# Patient Record
Sex: FEMALE | Race: WHITE
[De-identification: names, ages, dates, MRNs, and addresses within clinical notes are randomized per-mention and may not be internally consistent; named-entity substitution may affect disease eponyms.]

---

## 2017-01-10 ENCOUNTER — HOSPITAL ENCOUNTER (EMERGENCY)
Dept: HOSPITAL 74 - FER | Age: 82
Discharge: HOME | End: 2017-01-10
Payer: COMMERCIAL

## 2017-01-10 VITALS — SYSTOLIC BLOOD PRESSURE: 143 MMHG | HEART RATE: 98 BPM | TEMPERATURE: 97.9 F | DIASTOLIC BLOOD PRESSURE: 63 MMHG

## 2017-01-10 VITALS — BODY MASS INDEX: 30.9 KG/M2

## 2017-01-10 DIAGNOSIS — Z87.891: ICD-10-CM

## 2017-01-10 DIAGNOSIS — E78.00: ICD-10-CM

## 2017-01-10 DIAGNOSIS — S20.211A: Primary | ICD-10-CM

## 2017-01-10 DIAGNOSIS — I10: ICD-10-CM
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
## 2017-01-10 DIAGNOSIS — Z85.3: ICD-10-CM

## 2017-01-10 DIAGNOSIS — E11.9: ICD-10-CM
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
## 2017-01-10 DIAGNOSIS — Z79.82: ICD-10-CM

## 2017-01-10 NOTE — PDOC
History of Present Illness





- General


Chief Complaint: Injury


Stated Complaint: RT RIBCAGE, RT UPPER BACK PAIN


Time Seen by Provider: 01/10/17 10:49





- History of Present Illness


Initial Comments: 





01/10/17 12:07


Chief complaint: Pain right lateral ribs





History of present illness: Patient fell on Saturday, striking her right 

lateral chest wall. No pain at the time. Yesterday began to feel pain with 

movement and deep inspiration in the right lateral chest. This has become more 

severe.





Review of systems: Denies fevers/chills, cough, URI symptoms, sore throat, 

anterior or left-sided chest pain, shortness of breath, abdominal pain, nausea, 

vomiting, diarrhea, diaphoresis, dizziness or lightheadedness, vertigo, headache

, urinary symptoms, vaginal bleeding or discharge. Remainder systems reviewed 

and found to be negative





Past medical history: High blood pressure, non-insulin-dependent diabetes, 

elevated cholesterol.





Medications as noted





Social/family history: Former smoker, but now denies use of tobacco, alcohol, 

or prescription drugs. Active, stable, and family, no significant disability





Family history: Reviewed and noncontributory including early coronary artery 

disease, metabolic disease including diabetes, and cancer





Physical exam: Alert and oriented, well-developed well-nourished, no acute 

distress, cheerful and cooperative


Afebrile, vital signs stable


Head atraumatic. PERRLA, fundi benign, ENT clear


Neck supple without bruit mass or nodes. No tenderness or deformity to 

palpation. Full range of motion without pain


Chest clear to P&A with. Breath sounds throughout bilaterally. There is mild 

splinting with deep inspiration. There is tenderness of the right lateral chest 

wall, mid portion of the rib cage, without instability, crepitus, or deformity


CV S1 and S2 normal without murmur or gallop pulses full and symmetric no JVD 

or edema


Abdomen nondistended. Bowel sounds normal. Soft without masses tenderness 

organomegaly. No CVAT


Extremities no CCE


Neurological intact





Impression: Contusion of the rib cage, the absence of pain initially suggests 

that this is probably due to inflammation or muscle spasm





Plan: X-rays and further medical management depending on results.





Past History





- Past Medical History


Allergies/Adverse Reactions: 


 Allergies











Allergy/AdvReac Type Severity Reaction Status Date / Time


 


meperidine HCl [From Demerol] Allergy   Verified 01/10/17 10:29


 


Penicillins Allergy  Rash Verified 01/10/17 10:29











Home Medications: 


Ambulatory Orders





Aspirin [Aspirin EC] 81 mg PO DAILY 01/10/17 


Beta-Carotene(A) W-C and E/Min [Ocuvite (Nf)] 1 tab PO DAILY 01/10/17 


Calcium (Oyster Shell) [Os-Jaquan 500Mg -] 500 mg PO DAILY 01/10/17 


Cholecalciferol (Vitamin D3) [Vitamin D3] 5,000 unit PO BID 01/10/17 


Gabapentin 100 mg PO TID 01/10/17 


Glipizide 5 mg PO DAILY 01/10/17 


Levothyroxine [Synthroid -] 100 mcg PO DAILY 01/10/17 


Losartan/Hydrochlorothiazide [Losartan-Hctz 100-25 mg Tab] 1 each PO DAILY 01/10

/17 


Simvastatin 10 mg PO DAILY 01/10/17 








Anemia: No


Asthma: No


Cancer: Yes (LEFT BREAST)


CVA: No


COPD: No


Dementia: No


Diabetes: Yes


GI Disorders: No


 Disorders: No


HTN: Yes


Hypercholesterolemia: Yes


Liver Disease: No


Seizures: Yes


Thyroid Disease: No





- Surgical History


Appendectomy: Yes





- Psycho/Social/Smoking Cessation Hx


Anxiety: No


Suicidal Ideation: No


Smoking History: Former smoker


Have you smoked in the past 12 months: No


If you are a former smoker, when did you quit?: 2009


Information on smoking cessation initiated: No


Hx Alcohol Use: No


Drug/Substance Use Hx: No


Substance Use Type: None


Hx Substance Use Treatment: No





*Physical Exam





- Vital Signs


 Last Vital Signs











Temp Pulse Resp BP Pulse Ox


 


 97.9 F   98 H  18   143/63   98 


 


 01/10/17 10:28  01/10/17 10:28  01/10/17 10:28  01/10/17 10:28  01/10/17 10:28














Medical Decision Making





- Medical Decision Making





01/10/17 12:51


X-ray reviewed: No rib fractures. Some atelectasis at the right base, probably 

from splinting.





Patient declines narcotic analgesics. She will continue to take Advil as 

needed. She will do deep breathing exercises to keep the lungs expanded as 

often as possible as recommended. She will follow up with her primary physician 

if there is increased pain, fever or chills, cough, or other chest symptoms





She is fully ambulatory and in no significant pain or other distress upon 

discharge to follow-up as directed





*DC/Admit/Observation/Transfer


Diagnosis at time of Disposition: 


Contusion of rib


Qualifiers:


 Encounter type: initial encounter Laterality: right Qualified Code(s): 

S20.211A - Contusion of right front wall of thorax, initial encounter





- Discharge Dispostion


Disposition: HOME


Condition at time of disposition: Stable


Admit: No





- Referrals


Referrals: 


Melania Lemons MD [Primary Care Provider] - 2 Days





- Patient Instructions


Printed Discharge Instructions:  DI for Rib Contusion


Additional Instructions: 


Take adequate pain relief. Deep breathing exercises to keep lungs expanded. 

Recheck immediately if pain worsens or if you develop fever or chills, 

shortness of breath, or cough. Otherwise follow-up with your primary physician 

as directed

## 2017-06-23 ENCOUNTER — HOSPITAL ENCOUNTER (INPATIENT)
Dept: HOSPITAL 74 - FER | Age: 82
LOS: 4 days | Discharge: HOME HEALTH | DRG: 602 | End: 2017-06-27
Attending: INTERNAL MEDICINE | Admitting: INTERNAL MEDICINE
Payer: COMMERCIAL

## 2017-06-23 VITALS — BODY MASS INDEX: 35.6 KG/M2

## 2017-06-23 DIAGNOSIS — Z87.891: ICD-10-CM

## 2017-06-23 DIAGNOSIS — L03.116: Primary | ICD-10-CM

## 2017-06-23 DIAGNOSIS — G35: ICD-10-CM

## 2017-06-23 DIAGNOSIS — E11.40: ICD-10-CM

## 2017-06-23 DIAGNOSIS — Z79.84: ICD-10-CM

## 2017-06-23 DIAGNOSIS — T36.1X5A: ICD-10-CM

## 2017-06-23 DIAGNOSIS — Z66: ICD-10-CM

## 2017-06-23 DIAGNOSIS — R26.81: ICD-10-CM

## 2017-06-23 DIAGNOSIS — M19.90: ICD-10-CM

## 2017-06-23 DIAGNOSIS — Z85.3: ICD-10-CM

## 2017-06-23 DIAGNOSIS — I50.33: ICD-10-CM

## 2017-06-23 DIAGNOSIS — K52.1: ICD-10-CM

## 2017-06-23 DIAGNOSIS — M62.81: ICD-10-CM

## 2017-06-23 DIAGNOSIS — E78.00: ICD-10-CM

## 2017-06-23 DIAGNOSIS — I50.32: ICD-10-CM

## 2017-06-23 DIAGNOSIS — E66.9: ICD-10-CM

## 2017-06-23 DIAGNOSIS — I11.0: ICD-10-CM

## 2017-06-23 DIAGNOSIS — R60.0: ICD-10-CM

## 2017-06-23 LAB
ALBUMIN SERPL-MCNC: 3.8 G/DL (ref 3.5–5)
ALP SERPL-CCNC: 92 U/L (ref 32–92)
ALT SERPL-CCNC: 11 U/L (ref 10–40)
ANION GAP SERPL CALC-SCNC: 9 MMOL/L (ref 8–16)
AST SERPL-CCNC: 21 U/L (ref 10–42)
BASOPHILS # BLD: 0.4 % (ref 0–2)
BILIRUB SERPL-MCNC: 0.4 MG/DL (ref 0.2–1)
CALCIUM SERPL-MCNC: 10.1 MG/DL (ref 8.4–10.2)
CO2 SERPL-SCNC: 30 MMOL/L (ref 22–28)
CREAT SERPL-MCNC: 1.4 MG/DL (ref 0.6–1.3)
DEPRECATED RDW RBC AUTO: 14.5 % (ref 11.6–15.6)
EOSINOPHIL # BLD: 2 % (ref 0–4.5)
GLUCOSE SERPL-MCNC: 70 MG/DL (ref 74–106)
MCH RBC QN AUTO: 29.7 PG (ref 25.7–33.7)
MCHC RBC AUTO-ENTMCNC: 33.2 G/DL (ref 32–36)
MCV RBC: 89.4 FL (ref 80–96)
NEUTROPHILS # BLD: 79.5 % (ref 42.8–82.8)
PLATELET # BLD AUTO: 233 K/MM3 (ref 134–434)
PMV BLD: 11 FL (ref 7.5–11.1)
PROT SERPL-MCNC: 7.1 G/DL (ref 6.4–8.3)
WBC # BLD AUTO: 11.2 K/MM3 (ref 4–10.8)

## 2017-06-23 RX ADMIN — GABAPENTIN SCH MG: 300 CAPSULE ORAL at 21:40

## 2017-06-23 RX ADMIN — LOSARTAN POTASSIUM AND HYDROCHLOROTHIAZIDE TABLETS SCH TAB: 50; 12.5 TABLET, FILM COATED ORAL at 21:40

## 2017-06-23 RX ADMIN — ATORVASTATIN CALCIUM SCH MG: 10 TABLET, FILM COATED ORAL at 21:40

## 2017-06-23 RX ADMIN — CEFTRIAXONE SCH GM: 1 INJECTION, POWDER, FOR SOLUTION INTRAMUSCULAR; INTRAVENOUS at 21:40

## 2017-06-23 NOTE — PDOC
History of Present Illness





- General


Chief Complaint: Redness To Affected Area


Stated Complaint: BLE RED, SWELLING


Time Seen by Provider: 06/23/17 11:26


History Source: Patient


Exam Limitations: No Limitations





- History of Present Illness


Initial Comments: 


88 yo F presents with BLE swelling, LLE redness. She is accompanied by her 

aide. Patient states that she has noted swelling in both of her legs recently. 

She has developed blisters on both, the RLE blister has opened and is draining 

clear fluid. The LLE blisters are intact. The LLE has become erythematous and 

warm since last night, however. She has history of BLE edema, but this is worse 

than usual.





PMD Dr. Lemons








Past History





- Past Medical History


Allergies/Adverse Reactions: 


 Allergies











Allergy/AdvReac Type Severity Reaction Status Date / Time


 


meperidine HCl [From Demerol] Allergy   Verified 06/23/17 11:05


 


Penicillins Allergy  Rash Verified 06/23/17 11:05











Home Medications: 


Ambulatory Orders





Aspirin [Aspirin EC] 81 mg PO DAILY 01/10/17 


Beta-Carotene(A) W-C and E/Min [Ocuvite (Nf)] 1 tab PO DAILY 01/10/17 


Calcium (Oyster Shell) [Os-Jaquan 500Mg -] 500 mg PO DAILY 01/10/17 


Cholecalciferol (Vitamin D3) [Vitamin D3] 5,000 unit PO BID 01/10/17 


Gabapentin 100 mg PO TID 01/10/17 


Glipizide 5 mg PO DAILY 01/10/17 


Levothyroxine [Synthroid -] 100 mcg PO DAILY 01/10/17 


Losartan/Hydrochlorothiazide [Losartan-Hctz 100-25 mg Tab] 1 each PO DAILY 01/10

/17 


Simvastatin 10 mg PO DAILY 01/10/17 








Anemia: No


Asthma: No


Cancer: Yes (LEFT BREAST)


CVA: No


COPD: No


Dementia: No


Diabetes: Yes


GI Disorders: No


 Disorders: No


HTN: Yes


Hypercholesterolemia: Yes


Liver Disease: No


Seizures: Yes


Thyroid Disease: No





- Surgical History


Appendectomy: Yes





- Psycho/Social/Smoking Cessation Hx


Anxiety: No


Suicidal Ideation: No


Smoking History: Former smoker


Have you smoked in the past 12 months: No


If you are a former smoker, when did you quit?: 2009


Information on smoking cessation initiated: No


Hx Alcohol Use: No


Drug/Substance Use Hx: No


Substance Use Type: None


Hx Substance Use Treatment: No





**Review of Systems





- Review of Systems


Able to Perform ROS?: Yes


Comments:: 


GENERAL/CONSTITUTIONAL: No fever or chills. No weakness.


HEAD, EYES, EARS, NOSE AND THROAT: No change in vision. No ear pain or 

discharge. No sore throat.


CARDIOVASCULAR: No chest pain or shortness of breath.


RESPIRATORY: No cough, wheezing, or hemoptysis.


GASTROINTESTINAL: No nausea, vomiting, diarrhea or constipation.


GENITOURINARY: No dysuria, frequency, or change in urination.


MUSCULOSKELETAL: +BLE swelling. No neck or back pain.


SKIN: +Erythema to the L shin.


NEUROLOGIC: No headache, vertigo, loss of consciousness, or change in strength/

sensation.


ENDOCRINE: No increased thirst. No abnormal weight change.


HEMATOLOGIC/LYMPHATIC: No anemia, easy bleeding, or history of blood clots.


ALLERGIC/IMMUNOLOGIC: No hives or skin allergy.








*Physical Exam





- Vital Signs


 Last Vital Signs











Temp Pulse Resp BP Pulse Ox


 


 99 F   90   19   161/74   95 


 


 06/23/17 11:00  06/23/17 11:00  06/23/17 11:00  06/23/17 11:00 06/23/17 11:00














- Physical Exam


Comments: 


GENERAL: Awake, alert, and fully oriented, in no acute distress


HEAD: No signs of trauma


EYES: PERRLA, EOMI, sclera anicteric, conjunctiva clear


ENT: Auricles normal inspection, hearing grossly normal, nares patent, 

oropharynx clear without exudates. Moist mucosa


NECK: Normal ROM, supple, no lymphadenopathy, JVD, or masses


LUNGS: Breath sounds equal, clear to auscultation bilaterally. No wheezes, and 

no crackles


HEART: Regular rate and rhythm, normal S1 and S2, no murmurs, rubs or gallops


ABDOMEN: Soft, nontender, normoactive bowel sounds. No guarding, no rebound. No 

masses


EXTREMITIES: 3+ pitting edema to BLE. +Open lesion to the R shin draining clear 

fluid. No erythema. L shin with two intact bullous lesions containing clear 

fluid. +Erythema to the L shin. Remainder of extremities with normal range of 

motion, no edema. No clubbing or cyanosis. No cords, erythema, or tenderness


NEUROLOGICAL: Cranial nerves II through XII grossly intact. Normal speech, 

normal gait


SKIN: Warm, Dry, normal turgor, no rashes or lesions noted.








**Heart Score/ECG Review





- ECG Impressions


Comment:: 


EKG read 15:58- NSR 91 bpm, LAD,  RBBB. 





ED Treatment Course





- LABORATORY


CBC & Chemistry Diagram: 


 06/23/17 13:30





 06/23/17 13:30





Medical Decision Making





- Medical Decision Making


Case d/w Dr. Lemons, will place on obs for cellulitis. I have sent BCx and 

given clinda. 








*DC/Admit/Observation/Transfer


Diagnosis at time of Disposition: 


 Cellulitis of left leg





- Discharge Dispostion


Condition at time of disposition: Stable


Admit: Yes





- Referrals


Referrals: 


Melania Lemons MD [Primary Care Provider] -

## 2017-06-24 RX ADMIN — GABAPENTIN SCH MG: 300 CAPSULE ORAL at 22:02

## 2017-06-24 RX ADMIN — LEVOTHYROXINE SODIUM SCH MCG: 100 TABLET ORAL at 06:50

## 2017-06-24 RX ADMIN — FUROSEMIDE SCH MG: 10 INJECTION, SOLUTION INTRAVENOUS at 09:34

## 2017-06-24 RX ADMIN — SILVER SULFADIAZINE SCH APPLIC: 10 CREAM TOPICAL at 09:34

## 2017-06-24 RX ADMIN — CEFTRIAXONE SCH GM: 1 INJECTION, POWDER, FOR SOLUTION INTRAMUSCULAR; INTRAVENOUS at 09:34

## 2017-06-24 RX ADMIN — GABAPENTIN SCH MG: 300 CAPSULE ORAL at 06:50

## 2017-06-24 RX ADMIN — HYDROCHLOROTHIAZIDE SCH MG: 12.5 CAPSULE ORAL at 09:34

## 2017-06-24 RX ADMIN — LOSARTAN POTASSIUM AND HYDROCHLOROTHIAZIDE TABLETS SCH TAB: 50; 12.5 TABLET, FILM COATED ORAL at 09:34

## 2017-06-24 RX ADMIN — GABAPENTIN SCH MG: 300 CAPSULE ORAL at 13:23

## 2017-06-24 RX ADMIN — GLIPIZIDE SCH MG: 5 TABLET ORAL at 06:49

## 2017-06-24 RX ADMIN — ATORVASTATIN CALCIUM SCH MG: 10 TABLET, FILM COATED ORAL at 22:02

## 2017-06-24 NOTE — PN
Progress Note, Physician


Chief Complaint: 


The patient has less edema in the lower extremities, denies any leg discomfort 

or pain. She was able to ambulate earlier today without any dyspnea. Says that 

she is urinating with LAsix a significant amount of urine.


History of Present Illness: 


feeling better able to walk with less dyspnea





- Current Medication List


Current Medications: 


Active Medications





Atorvastatin Calcium (Lipitor -)  10 mg PO HS [facility]


   Last Admin: 06/23/17 21:40 Dose:  10 mg


Ceftriaxone Sodium (Rocephin 1gm Ivpb (Pre-Docked))  1 gm IVPB DAILY [facility]


   Last Admin: 06/24/17 09:34 Dose:  1 gm


Furosemide (Lasix Injection -)  40 mg IVPB DAILY [facility]


   Last Admin: 06/24/17 09:34 Dose:  40 mg


Gabapentin (Neurontin -)  300 mg PO TID [facility]


   Last Admin: 06/24/17 13:23 Dose:  300 mg


Glipizide (Glucotrol -)  5 mg PO DAILY@0700 [facility]


   Last Admin: 06/24/17 06:49 Dose:  5 mg


HCTZ/Losartan Potassium (Hyzaar -)  1 tab PO DAILY [facility]


   Last Admin: 06/24/17 09:34 Dose:  1 tab


Hydrochlorothiazide (Hctz -)  12.5 mg PO DAILY [facility]


   Last Admin: 06/24/17 09:34 Dose:  12.5 mg


Levothyroxine Sodium (Synthroid -)  100 mcg GT DAILY@0700 [facility]


   Last Admin: 06/24/17 06:50 Dose:  100 mcg


Potassium Chloride (K-Dur -)  20 meq PO ONCE ONE


   Stop: 06/24/17 18:09


Silver Sulfadiazine (Silvadene -)  1 applic TP DAILY [facility]


   Last Admin: 06/24/17 09:34 Dose:  1 applic











- Objective


Vital Signs: 


 Vital Signs











Temperature  98.1 F   06/24/17 14:28


 


Pulse Rate  87   06/24/17 14:28


 


Respiratory Rate  19   06/24/17 14:28


 


Blood Pressure  120/43   06/24/17 14:28


 


O2 Sat by Pulse Oximetry (%)  95   06/24/17 14:28











Constitutional: Yes: No Distress, Calm


Eyes: Yes: Conjunctiva Clear, EOM Intact


HENT: Yes: Atraumatic, Normocephalic


Neck: Yes: Supple, Trachea Midline


Cardiovascular: Yes: Regular Rate and Rhythm, S1, S2


Respiratory: Yes: Regular, CTA Bilaterally


Gastrointestinal: Yes: Normal Bowel Sounds, Soft, Abdomen, Obese.  No: 

Hepatomegaly, Splenomegaly, Tenderness


...Rectal Exam: Yes: Deferred


Breast(s): Yes: Other (left breast lumpectomy)


Musculoskeletal: Yes: Other (left lower extremty weakness)


Extremities: No: Calf Tenderness


Edema: Yes


Edema: LLE: 1+, RLE: 1+


Peripheral Pulses WNL: Yes


Integumentary: Yes: Erythema (decreased erythema, blisters are still present)


Neurological: Yes: Alert, Oriented, Unsteady Gait, Weakness (of both lower 

extremities)


Psychiatric: Yes: Alert, Oriented





Problem List





- Problems


(1) Cellulitis of left leg


Assessment/Plan: 


continue Ceftriaxone 1 gm iv daily


continue Silvadene applications


continue DPD applications


Code(s): L03.116 - CELLULITIS OF LEFT LOWER LIMB





(2) Edema due to congestive heart failure


Assessment/Plan: 


lasix iv 40 mg daily for 3 dyas


 ECHO to asses cardiac function and possible CHF


K dur 20 Gretchen po once


Code(s): I50.9 - HEART FAILURE, UNSPECIFIED





(3) Multiple sclerosis not affecting current episode of care


Assessment/Plan: 


the patient's symptoms are stable


Physical therapy evaluation


Code(s): MKW8273 - 





(4) Diabetes mellitus


Assessment/Plan: 


Glipizide daily and diabetic diet


Code(s): E11.9 - TYPE 2 DIABETES MELLITUS WITHOUT COMPLICATIONS   Qualifiers: 


     Diabetes mellitus complication detail: with unspecified neuropathy     

Diabetes mellitus long term insulin use: without long term use

## 2017-06-24 NOTE — EKG
Test Reason : 

Blood Pressure : ***/*** mmHG

Vent. Rate : 091 BPM     Atrial Rate : 091 BPM

   P-R Int : 178 ms          QRS Dur : 170 ms

    QT Int : 440 ms       P-R-T Axes : 054 -59 040 degrees

   QTc Int : 541 ms

 

*** POOR DATA QUALITY, INTERPRETATION MAY BE ADVERSELY AFFECTED

NORMAL SINUS RHYTHM

LEFT AXIS DEVIATION

RIGHT BUNDLE BRANCH BLOCK

NO PREVIOUS ECGS AVAILABLE

Confirmed by MD PALMIRA, LOU ([id number]) on 6/24/2017 10:47:16 AM

 

Referred By: MD LI           Confirmed By:LOU CHAVIS MD

## 2017-06-25 RX ADMIN — HYDROCHLOROTHIAZIDE SCH MG: 12.5 CAPSULE ORAL at 10:04

## 2017-06-25 RX ADMIN — GABAPENTIN SCH MG: 300 CAPSULE ORAL at 05:45

## 2017-06-25 RX ADMIN — LEVOTHYROXINE SODIUM SCH MCG: 100 TABLET ORAL at 06:29

## 2017-06-25 RX ADMIN — FUROSEMIDE SCH MG: 10 INJECTION, SOLUTION INTRAVENOUS at 10:08

## 2017-06-25 RX ADMIN — CEFTRIAXONE SCH GM: 1 INJECTION, POWDER, FOR SOLUTION INTRAMUSCULAR; INTRAVENOUS at 10:05

## 2017-06-25 RX ADMIN — SILVER SULFADIAZINE SCH APPLIC: 10 CREAM TOPICAL at 11:00

## 2017-06-25 RX ADMIN — LOSARTAN POTASSIUM AND HYDROCHLOROTHIAZIDE TABLETS SCH TAB: 50; 12.5 TABLET, FILM COATED ORAL at 10:04

## 2017-06-25 RX ADMIN — ATORVASTATIN CALCIUM SCH MG: 10 TABLET, FILM COATED ORAL at 22:04

## 2017-06-25 RX ADMIN — GABAPENTIN SCH MG: 300 CAPSULE ORAL at 14:00

## 2017-06-25 RX ADMIN — GLIPIZIDE SCH MG: 5 TABLET ORAL at 06:29

## 2017-06-25 RX ADMIN — GABAPENTIN SCH MG: 300 CAPSULE ORAL at 22:04

## 2017-06-25 NOTE — PN
Progress Note, Physician


Chief Complaint: 


Pedal +1 edema in the lower extremities, denies any leg discomfort or pain. She 

was able to ambulate earlier today without any dyspnea. Says that she has good 

urine output since starting LAsix treatment





- Current Medication List


Current Medications: 


Active Medications





Atorvastatin Calcium (Lipitor -)  10 mg PO HS [facility]


   Last Admin: 06/24/17 22:02 Dose:  10 mg


Ceftriaxone Sodium (Rocephin 1gm Ivpb (Pre-Docked))  1 gm IVPB DAILY [facility]


   Last Admin: 06/25/17 10:05 Dose:  1 gm


Furosemide (Lasix Injection -)  40 mg IVPB DAILY [facility]


   Last Admin: 06/25/17 10:08 Dose:  40 mg


Gabapentin (Neurontin -)  300 mg PO TID [facility]


   Last Admin: 06/25/17 05:45 Dose:  300 mg


Glipizide (Glucotrol -)  5 mg PO DAILY@0700 [facility]


   Last Admin: 06/25/17 06:29 Dose:  5 mg


HCTZ/Losartan Potassium (Hyzaar -)  1 tab PO DAILY [facility]


   Last Admin: 06/25/17 10:04 Dose:  1 tab


Hydrochlorothiazide (Hctz -)  12.5 mg PO DAILY [facility]


   Last Admin: 06/25/17 10:04 Dose:  12.5 mg


Levothyroxine Sodium (Synthroid -)  100 mcg GT DAILY@0700 [facility]


   Last Admin: 06/25/17 06:29 Dose:  100 mcg


Silver Sulfadiazine (Silvadene -)  1 applic TP DAILY [facility]


   Last Admin: 06/24/17 09:34 Dose:  1 applic











- Objective


Vital Signs: 


 Vital Signs











Temperature  98.0 F   06/25/17 06:00


 


Pulse Rate  88   06/25/17 06:00


 


Respiratory Rate  19   06/25/17 06:00


 


Blood Pressure  139/44   06/25/17 06:00


 


O2 Sat by Pulse Oximetry (%)  93 L  06/25/17 06:00











Constitutional: Yes: No Distress, Calm


Eyes: Yes: Conjunctiva Clear, EOM Intact


HENT: Yes: Atraumatic, Normocephalic


Neck: Yes: Supple, Trachea Midline


Cardiovascular: Yes: Regular Rate and Rhythm, S1, S2


Respiratory: Yes: Regular, CTA Bilaterally


Gastrointestinal: Yes: Normal Bowel Sounds, Soft, Abdomen, Obese.  No: 

Hepatomegaly, Splenomegaly


Breast(s): Yes: Other (left mastectomy)


Musculoskeletal: Yes: WNL


Edema: Yes


Edema: LLE: 1+, RLE: 1+


Peripheral Pulses WNL: Yes


Wound/Incision: Yes: Other (letf anterior shin blsters resolved)


Neurological: Yes: Alert, Oriented


Psychiatric: Yes: Alert, Oriented





Problem List





- Problems


(1) Cellulitis of left leg


Assessment/Plan: 


Ceftriaxone 1 gm iv daily


Code(s): L03.116 - CELLULITIS OF LEFT LOWER LIMB





(2) Edema due to congestive heart failure


Assessment/Plan: 


lasix


Code(s): I50.9 - HEART FAILURE, UNSPECIFIED





(3) Multiple sclerosis not affecting current episode of care


Code(s): [id number] - 





(4) Diabetes mellitus


Assessment/Plan: 


Glipizide daily


Code(s): E11.9 - TYPE 2 DIABETES MELLITUS WITHOUT COMPLICATIONS   Qualifiers: 


     Diabetes mellitus complication detail: with unspecified neuropathy     

Diabetes mellitus long term insulin use: without long term use

## 2017-06-26 LAB
ALBUMIN SERPL-MCNC: 3.5 G/DL (ref 3.5–5)
ALP SERPL-CCNC: 91 U/L (ref 32–92)
ALT SERPL-CCNC: 12 U/L (ref 10–40)
ANION GAP SERPL CALC-SCNC: 11 MMOL/L (ref 8–16)
AST SERPL-CCNC: 21 U/L (ref 10–42)
BASOPHILS # BLD: 0.7 % (ref 0–2)
BILIRUB SERPL-MCNC: 0.8 MG/DL (ref 0.2–1)
CALCIUM SERPL-MCNC: 8.5 MG/DL (ref 8.4–10.2)
CO2 SERPL-SCNC: 32 MMOL/L (ref 22–28)
CREAT SERPL-MCNC: 1.6 MG/DL (ref 0.6–1.3)
DEPRECATED RDW RBC AUTO: 14.2 % (ref 11.6–15.6)
EOSINOPHIL # BLD: 3.4 % (ref 0–4.5)
GLUCOSE SERPL-MCNC: 111 MG/DL (ref 74–106)
MAGNESIUM SERPL-MCNC: 1.9 MG/DL (ref 1.8–2.4)
MCH RBC QN AUTO: 30.2 PG (ref 25.7–33.7)
MCHC RBC AUTO-ENTMCNC: 33.8 G/DL (ref 32–36)
MCV RBC: 89.3 FL (ref 80–96)
NEUTROPHILS # BLD: 75.9 % (ref 42.8–82.8)
PLATELET # BLD AUTO: 242 K/MM3 (ref 134–434)
PMV BLD: 11.1 FL (ref 7.5–11.1)
PROT SERPL-MCNC: 6.5 G/DL (ref 6.4–8.3)
WBC # BLD AUTO: 9.6 K/MM3 (ref 4–10.8)

## 2017-06-26 RX ADMIN — Medication SCH TAB: at 09:06

## 2017-06-26 RX ADMIN — CEFTRIAXONE SCH GM: 1 INJECTION, POWDER, FOR SOLUTION INTRAMUSCULAR; INTRAVENOUS at 09:08

## 2017-06-26 RX ADMIN — GABAPENTIN SCH MG: 300 CAPSULE ORAL at 06:17

## 2017-06-26 RX ADMIN — GABAPENTIN SCH MG: 300 CAPSULE ORAL at 14:17

## 2017-06-26 RX ADMIN — GLIPIZIDE SCH MG: 5 TABLET ORAL at 06:17

## 2017-06-26 RX ADMIN — Medication SCH TAB: at 22:31

## 2017-06-26 RX ADMIN — LEVOTHYROXINE SODIUM SCH MCG: 100 TABLET ORAL at 06:17

## 2017-06-26 RX ADMIN — GABAPENTIN SCH MG: 300 CAPSULE ORAL at 22:31

## 2017-06-26 RX ADMIN — LOSARTAN POTASSIUM AND HYDROCHLOROTHIAZIDE TABLETS SCH TAB: 50; 12.5 TABLET, FILM COATED ORAL at 09:07

## 2017-06-26 RX ADMIN — ATORVASTATIN CALCIUM SCH MG: 10 TABLET, FILM COATED ORAL at 22:31

## 2017-06-26 RX ADMIN — FUROSEMIDE SCH MG: 10 INJECTION, SOLUTION INTRAVENOUS at 09:07

## 2017-06-26 RX ADMIN — SILVER SULFADIAZINE SCH APPLIC: 10 CREAM TOPICAL at 09:08

## 2017-06-26 RX ADMIN — HYDROCHLOROTHIAZIDE SCH MG: 12.5 CAPSULE ORAL at 09:07

## 2017-06-26 NOTE — PN
Progress Note, Physician


Chief Complaint: 


EDEMA OF THE LOWER EXTREMITIES RESOLVED, denies any leg discomfort or pain. She 

was able to ambulate earlier today without any dyspnea.The patient is diuresing 

well with LAsix..the patient had echocardiogram and results are pending.





- Current Medication List


Current Medications: 


Active Medications





Atorvastatin Calcium (Lipitor -)  [facility]


   Last Admin: 06/25/17 22:04 Dose:  10 mg


Ceftriaxone Sodium (Rocephin 1gm Ivpb (Pre-Docked))  1 gm IVPB DAILY [facility]


   Last Admin: 06/26/17 09:08 Dose:  1 gm


Gabapentin (Neurontin -)  300 mg PO TID [facility]


   Last Admin: 06/26/17 06:17 Dose:  300 mg


Glipizide (Glucotrol -)  5 mg PO DAILY@0700 [facility]


   Last Admin: 06/26/17 06:17 Dose:  5 mg


HCTZ/Losartan Potassium (Hyzaar -)  1 tab PO DAILY [facility]


   Last Admin: 06/26/17 09:07 Dose:  1 tab


Hydrochlorothiazide (Hctz -)  12.5 mg PO DAILY [facility]


   Last Admin: 06/26/17 09:07 Dose:  12.5 mg


Lactobacillus Acidophilus (Bacid -)  1 tab PO BID [facility]


   Last Admin: 06/26/17 09:06 Dose:  1 tab


Levothyroxine Sodium (Synthroid -)  100 mcg GT DAILY@0700 [facility]


   Last Admin: 06/26/17 06:17 Dose:  100 mcg


Silver Sulfadiazine (Silvadene -)  1 applic TP DAILY [facility]


   Last Admin: 06/26/17 09:08 Dose:  1 applic











- Objective


Vital Signs: 


 Vital Signs











Temperature  97.4 F L  06/26/17 14:04


 


Pulse Rate  85   06/26/17 14:04


 


Respiratory Rate  18   06/26/17 14:04


 


Blood Pressure  104/39   06/26/17 14:04


 


O2 Sat by Pulse Oximetry (%)  93 L  06/26/17 14:04











Constitutional: Yes: No Distress, Calm


Eyes: Yes: Conjunctiva Clear, EOM Intact


HENT: Yes: Atraumatic, Normocephalic


Neck: Yes: Supple, Trachea Midline


Cardiovascular: Yes: Regular Rate and Rhythm, S1, S2


Respiratory: Yes: Regular, CTA Bilaterally


Gastrointestinal: Yes: Normal Bowel Sounds, Abdomen, Obese.  No: Hepatomegaly, 

Splenomegaly


Breast(s): Yes: Other (left lumpectomy)


Edema: Yes


Edema: LLE: 1+, RLE: 1+


Peripheral Pulses WNL: Yes


Neurological: Yes: Alert, Oriented


Psychiatric: Yes: Alert, Oriented


Labs: 


 CBC, BMP





 06/26/17 07:46 





 06/26/17 07:46 











Problem List





- Problems


(1) Cellulitis of left leg


Assessment/Plan: 


continue Ceftriaxone 1 gm iv daily


Code(s): L03.116 - CELLULITIS OF LEFT LOWER LIMB





(2) Edema due to congestive heart failure


Assessment/Plan: 


lasix


Code(s): I50.9 - HEART FAILURE, UNSPECIFIED





(3) Multiple sclerosis not affecting current episode of care


Assessment/Plan: 


the patient's symptoms are stable


Physical therapy evaluation


Code(s): [id number] - 





(4) Diabetes mellitus


Assessment/Plan: 


Glipizide daily


Code(s): E11.9 - TYPE 2 DIABETES MELLITUS WITHOUT COMPLICATIONS   Qualifiers: 


     Diabetes mellitus complication detail: with unspecified neuropathy     

Diabetes mellitus long term insulin use: without long term use





(5) HTN (hypertension)


Assessment/Plan: 


continue Benicar


Code(s): I10 - ESSENTIAL (PRIMARY) HYPERTENSION

## 2017-06-27 VITALS — TEMPERATURE: 98.1 F | HEART RATE: 66 BPM | DIASTOLIC BLOOD PRESSURE: 56 MMHG | SYSTOLIC BLOOD PRESSURE: 101 MMHG

## 2017-06-27 RX ADMIN — LEVOTHYROXINE SODIUM SCH MCG: 100 TABLET ORAL at 06:15

## 2017-06-27 RX ADMIN — SILVER SULFADIAZINE SCH APPLIC: 10 CREAM TOPICAL at 09:44

## 2017-06-27 RX ADMIN — GLIPIZIDE SCH MG: 5 TABLET ORAL at 06:14

## 2017-06-27 RX ADMIN — HYDROCHLOROTHIAZIDE SCH MG: 12.5 CAPSULE ORAL at 09:44

## 2017-06-27 RX ADMIN — Medication SCH TAB: at 09:44

## 2017-06-27 RX ADMIN — GABAPENTIN SCH: 300 CAPSULE ORAL at 17:18

## 2017-06-27 RX ADMIN — GABAPENTIN SCH MG: 300 CAPSULE ORAL at 06:14

## 2017-06-27 RX ADMIN — CEFTRIAXONE SCH: 1 INJECTION, POWDER, FOR SOLUTION INTRAMUSCULAR; INTRAVENOUS at 09:44

## 2017-06-27 RX ADMIN — LOSARTAN POTASSIUM AND HYDROCHLOROTHIAZIDE TABLETS SCH TAB: 50; 12.5 TABLET, FILM COATED ORAL at 09:44

## 2017-06-27 NOTE — PN
Progress Note, Physician


Chief Complaint: 


EDEMA OF THE LOWER EXTREMITIES RESOLVED, denies any leg discomfort or pain. She 

was able to ambulate earlier today without any dyspnea.The patient has good 

urine output with LAsix. ECHO showed mildly dilated left atrium, mild mitral 

valve thickening, mild annular calcifiation and mild mitral regurgitation.


History of Present Illness: 


The patient developed loose stools during the past 24 hours not associated with 

mucosities, abdominal cramps  or blood. She is feeling better, is able to walk 

with less dyspnea





- Current Medication List


Current Medications: 


Active Medications





Atorvastatin Calcium (Lipitor -)  10 mg PO HS [facility]


   Last Admin: 06/26/17 22:31 Dose:  10 mg


Ceftriaxone Sodium (Rocephin 1gm Ivpb (Pre-Docked))  1 gm IVPB DAILY [facility]


   Last Admin: 06/27/17 09:44 Dose:  Not Given


Gabapentin (Neurontin -)  300 mg PO TID [facility]


   Last Admin: 06/27/17 06:14 Dose:  300 mg


Glipizide (Glucotrol -)  5 mg PO DAILY@0700 [facility]


   Last Admin: 06/27/17 06:14 Dose:  5 mg


HCTZ/Losartan Potassium (Hyzaar -)  1 tab PO DAILY [facility]


   Last Admin: 06/27/17 09:44 Dose:  1 tab


Hydrochlorothiazide (Hctz -)  12.5 mg PO DAILY [facility]


   Last Admin: 06/27/17 09:44 Dose:  12.5 mg


Lactobacillus Acidophilus (Bacid -)  1 tab PO BID [facility]


   Last Admin: 06/27/17 09:44 Dose:  1 tab


Levothyroxine Sodium (Synthroid -)  100 mcg GT DAILY@0700 [facility]


   Last Admin: 06/27/17 06:15 Dose:  100 mcg


Silver Sulfadiazine (Silvadene -)  1 applic TP DAILY [facility]


   Last Admin: 06/27/17 09:44 Dose:  1 applic











- Objective


Vital Signs: 


 Vital Signs











Temperature  98.1 F   06/27/17 14:21


 


Pulse Rate  66   06/27/17 14:21


 


Respiratory Rate  18   06/27/17 14:21


 


Blood Pressure  101/56   06/27/17 14:21


 


O2 Sat by Pulse Oximetry (%)  100   06/27/17 14:21











Constitutional: Yes: No Distress, Calm


Eyes: Yes: Conjunctiva Clear, EOM Intact


HENT: Yes: Atraumatic, Normocephalic


Neck: Yes: Supple, Trachea Midline


Cardiovascular: Yes: Regular Rate and Rhythm, S1, S2


Respiratory: Yes: Regular, CTA Bilaterally


Gastrointestinal: Yes: Normal Bowel Sounds, Soft


...Rectal Exam: Yes: WNL


Genitourinary: Yes: WNL


Extremities: No: Calf Tenderness, Erythema


Edema: No


Peripheral Pulses WNL: Yes


Integumentary: Yes: Erythema (resolved bilaterally)


Neurological: Yes: Alert, Oriented, Unsteady Gait, Weakness


Labs: 


 CBC, BMP





 06/26/17 07:46 





 06/26/17 07:46 











Problem List





- Problems


(1) Cellulitis of left leg


Assessment/Plan: 


discontinue Ceftriaxone 1 gm iv daily


start Keflex 500 mg po qid


follow up in the office in 1 week


discharge planning with [facility] 


Code(s): L03.116 - CELLULITIS OF LEFT LOWER LIMB





(2) Edema due to congestive heart failure


Assessment/Plan: 


lasix 20 mg po every other day in addition to Benicar 40/12.5 mg


Code(s): I50.9 - HEART FAILURE, UNSPECIFIED





(3) Multiple sclerosis not affecting current episode of care


Assessment/Plan: 


the patient's symptoms are stable


Physical therapy evaluation


Code(s): TGO1253 - 





(4) Diabetes mellitus


Assessment/Plan: 


Glipizide daily


Code(s): E11.9 - TYPE 2 DIABETES MELLITUS WITHOUT COMPLICATIONS   Qualifiers: 


     Diabetes mellitus complication detail: with unspecified neuropathy     

Diabetes mellitus long term insulin use: without long term use





(5) HTN (hypertension)


Assessment/Plan: 


continue Benicar


Code(s): I10 - ESSENTIAL (PRIMARY) HYPERTENSION   





(6) Diarrhea due to drug


Assessment/Plan: 


secondary to antibiotic administration


add BAcid 1 tab po bid and monitor


Code(s): K52.1 - TOXIC GASTROENTERITIS AND COLITIS

## 2017-06-27 NOTE — DS
Physical Examination


Vital Signs: 


 Vital Signs











Temperature  98.1 F   06/27/17 14:21


 


Pulse Rate  66   06/27/17 14:21


 


Respiratory Rate  18   06/27/17 14:21


 


Blood Pressure  101/56   06/27/17 14:21


 


O2 Sat by Pulse Oximetry (%)  100   06/27/17 14:21











Constitutional: Yes: Well Nourished, No Distress


Eyes: Yes: Conjunctiva Clear, EOM Intact


HENT: Yes: Atraumatic, Normocephalic


Cardiovascular: Yes: Regular Rate and Rhythm, S1, S2


Respiratory: Yes: Regular, CTA Bilaterally


Gastrointestinal: Yes: Normal Bowel Sounds, Soft, Abdomen, Obese.  No: 

Hepatomegaly, Splenomegaly


...Rectal Exam: Yes: Deferred


Breast(s): Yes: Other (left breast lumpectomy)


Extremities: No: Calf Tenderness


Edema: No


Peripheral Pulses WNL: Yes


Integumentary: Yes: Other (healed anterior shins lesions , covered by dry crust)


Neurological: Yes: Alert, Oriented


Psychiatric: Yes: Alert, Oriented


Labs: 


 CBC, BMP





 06/26/17 07:46 





 06/26/17 07:46 











Discharge Summary


Reason For Visit: CELLULITIS LT LEG


Current Active Problems





Cellulitis of left leg (Acute) 


Diabetes mellitus (Acute) 


Edema due to congestive heart failure (Acute) 


Multiple sclerosis not affecting current episode of care (Acute) 








Condition: Stable





- Instructions


Referrals: 


Melania eLmons MD [Primary Care Provider] - 


Disposition: [facility]/HOME HEALTH CARE





- Home Medications


Comprehensive Discharge Medication List: 


Ambulatory Orders





Aspirin [Aspirin EC] 81 mg PO DAILY 01/10/17 


Beta-Carotene(A) W-C and E/Min [Ocuvite (Nf) -] 1 tab PO DAILY 01/10/17 


Calcium (Oyster Shell) [Os-Jaquan 500MG -] 500 mg PO DAILY 01/10/17 


Cholecalciferol (Vitamin D3) [Vitamin D3] 5,000 unit PO BID 01/10/17 


Gabapentin 100 mg PO TID 01/10/17 


Glipizide 5 mg PO DAILY 01/10/17 


Levothyroxine [Synthroid -] 100 mcg PO DAILY 01/10/17 


Losartan/Hydrochlorothiazide [Losartan-Hctz 100-25 mg Tab] 1 each PO DAILY 01/10

/17 


Simvastatin 10 mg PO DAILY 01/10/17 


Atorvastatin Ca [Lipitor] 10 mg PO HS  tablet 06/27/17 


Glipizide [Glucotrol -] 5 mg PO DAILY@0700  tablet 06/27/17 


Hydrochlorothiazide [Hctz -] 12.5 mg PO DAILY  cap 06/27/17 


Lactobacillus Acidophilus [Bacid -] 1 tab PO BID #60 tab 06/27/17 


Levothyroxine [Synthroid -] 100 mcg GT DAILY@0700  tablet 06/27/17 


Losartan 50Mg/Hctz 12.5MG [Hyzaar -] 1 tab PO DAILY  tablet 06/27/17 


Silver Sulfadiazine 1% Top Cr [Silvadene -] 1 applic TP DAILY  jar 06/27/17

## 2017-10-18 ENCOUNTER — HOSPITAL ENCOUNTER (OUTPATIENT)
Dept: HOSPITAL 74 - FRADUS-SUR | Age: 82
Discharge: HOME | End: 2017-10-18
Attending: LEGAL MEDICINE
Payer: COMMERCIAL

## 2017-10-18 DIAGNOSIS — N63.24: ICD-10-CM

## 2017-10-18 DIAGNOSIS — C50.312: Primary | ICD-10-CM

## 2017-10-18 PROCEDURE — 0HBU3ZX EXCISION OF LEFT BREAST, PERCUTANEOUS APPROACH, DIAGNOSTIC: ICD-10-PCS

## 2017-10-18 PROCEDURE — A4648 IMPLANTABLE TISSUE MARKER: HCPCS

## 2017-10-19 NOTE — PATH
Surgical Pathology Report



Patient Name:  SACKS, DIANA

Accession #:  [id number]

[facility]. Rec. #:  [id number]                                                        

   /Age/Gender:  1930 (Age: 87) / F

Account:  [id number]                                                          

             Location: [facility]

Taken:  10/18/2017

Received:  10/18/2017

Reported:  10/19/2017

Physicians:  KATHRYN Alcocer

  



Specimen(s) Received

 LEFT BREAST CORE BIOPSY 7:00, 3CM FN 





Clinical History

Ultrasound findings: Highly suspicious







Final Diagnosis

BREAST, LEFT, 7:00, 3 CM FN, CORE BIOPSY:

INVASIVE DUCTAL CARCINOMA, MODERATELY DIFFERENTIATED, MEASURING AT LEAST 7 MM IN

GREATEST DIMENSION IN THIS MATERIAL.



Results of ER) and AR studies performed at [facility] are as

follows:

ER (clone [id number] mouse monoclonal antibody by [employer]) : >90 % nuclear staining with

strong intensity (Positive).

AR (clone16 mouse monoclonal antibody by [employer]): ~10 % nuclear staining with

moderate intensity (Positive).



Results of HER-2 and Ki67 studies will be reported separately in an addendum.



Positive and negative controls (internal if applicable) show appropriate

results.

Formalin fixation and cold ischemic times are within current ASCO/CAP

recommendations for ER, AR and Her2 testing.





***Electronically Signed***

Michaela Warren M.D.



Addendum     

Reported: 10/23/2017



Addendum Diagnosis

Results of Her2 (IHC) & Ki-67 studies performed at [place], [place] ([id number]) are as follows:

Her2 IHC (EP3 from [employer], formerly known as [id number], using Bond Polymer Refine

detection kit): 0 (Negative).

Ki-67: ~30% (Intermediate proliferative index).



Positive and negative controls (internal if applicable) show appropriate

results.





   

 



Gross Description

Received in formalin, labeled "left breast biopsy 7:00, 3cmfn," are 3

tan-yellow, cylindrical portions of fibroadipose tissue ranging from 0.9-1.6 cm.

in length and averaging 0.1 cm. in diameter. The specimen is submitted in toto

in one cassette. 

Time to formalin fixation: 2 minutes

Total formalin fixation time: Approximately 8 hours.

/10/18/2017



saudi/10/18/2017

## 2018-01-18 ENCOUNTER — HOSPITAL ENCOUNTER (OUTPATIENT)
Dept: HOSPITAL 74 - FER | Age: 83
Setting detail: OBSERVATION
LOS: 1 days | Discharge: NURSING, REHAB OR LONG-TERM CARE | End: 2018-01-19
Attending: INTERNAL MEDICINE | Admitting: INTERNAL MEDICINE
Payer: COMMERCIAL

## 2018-01-18 VITALS — BODY MASS INDEX: 28.3 KG/M2

## 2018-01-18 DIAGNOSIS — R26.2: ICD-10-CM

## 2018-01-18 DIAGNOSIS — C50.912: ICD-10-CM

## 2018-01-18 DIAGNOSIS — Z99.89: ICD-10-CM
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
## 2018-01-18 DIAGNOSIS — Z91.81: ICD-10-CM

## 2018-01-18 DIAGNOSIS — Z88.0: ICD-10-CM

## 2018-01-18 DIAGNOSIS — S32.89XA: Primary | ICD-10-CM

## 2018-01-18 DIAGNOSIS — Z87.891: ICD-10-CM

## 2018-01-18 DIAGNOSIS — E78.5: ICD-10-CM

## 2018-01-18 DIAGNOSIS — E66.9: ICD-10-CM

## 2018-01-18 DIAGNOSIS — Z79.82: ICD-10-CM

## 2018-01-18 DIAGNOSIS — T79.6XXA: ICD-10-CM

## 2018-01-18 DIAGNOSIS — Z79.84: ICD-10-CM

## 2018-01-18 DIAGNOSIS — E11.69: ICD-10-CM

## 2018-01-18 DIAGNOSIS — I10: ICD-10-CM

## 2018-01-18 DIAGNOSIS — G35: ICD-10-CM

## 2018-01-18 DIAGNOSIS — S30.0XXA: ICD-10-CM

## 2018-01-18 DIAGNOSIS — E03.9: ICD-10-CM
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
## 2018-01-18 LAB
ALBUMIN SERPL-MCNC: 4 G/DL (ref 3.5–5)
ALP SERPL-CCNC: 101 U/L (ref 32–92)
ALT SERPL-CCNC: 13 U/L (ref 10–40)
ANION GAP SERPL CALC-SCNC: 10 MMOL/L (ref 8–16)
AST SERPL-CCNC: 25 U/L (ref 10–42)
BILIRUB SERPL-MCNC: 0.4 MG/DL (ref 0.2–1)
BUN SERPL-MCNC: 41 MG/DL (ref 7–18)
CALCIUM SERPL-MCNC: 9.7 MG/DL (ref 8.4–10.2)
CHLORIDE SERPL-SCNC: 101 MMOL/L (ref 98–107)
CO2 SERPL-SCNC: 31 MMOL/L (ref 22–28)
COLOR UR: YELLOW
CREAT SERPL-MCNC: 1.4 MG/DL (ref 0.6–1.3)
DEPRECATED RDW RBC AUTO: 14.5 % (ref 11.6–15.6)
GLUCOSE SERPL-MCNC: 93 MG/DL (ref 74–106)
HCT VFR BLD CALC: 39.7 % (ref 32.4–45.2)
HGB BLD-MCNC: 12.9 GM/DL (ref 10.7–15.3)
INR BLD: 1.11 (ref 0.82–1.09)
MCH RBC QN AUTO: 29.4 PG (ref 25.7–33.7)
MCHC RBC AUTO-ENTMCNC: 32.5 G/DL (ref 32–36)
MCV RBC: 90.7 FL (ref 80–96)
PH UR: 7 [PH] (ref 4.5–8)
PLATELET # BLD AUTO: 240 K/MM3 (ref 134–434)
PLATELET BLD QL SMEAR: ADEQUATE
PMV BLD: 11.5 FL (ref 7.5–11.1)
POTASSIUM SERPLBLD-SCNC: 4 MMOL/L (ref 3.5–5.1)
PROT SERPL-MCNC: 7.6 G/DL (ref 6.4–8.3)
PT PNL PPP: 12.4 SEC (ref 10.2–13)
RBC # BLD AUTO: 4.38 M/MM3 (ref 3.6–5.2)
SODIUM SERPL-SCNC: 142 MMOL/L (ref 136–145)
SP GR UR: 1.01 (ref 1–1.02)
TROPONIN I SERPL-MCNC: < 0.03 NG/ML (ref 0.03–0.5)
UROBILINOGEN UR STRIP-MCNC: 0.2 MG/DL (ref 0.2–1)
WBC # BLD AUTO: 14.8 K/MM3 (ref 4–10.8)

## 2018-01-18 PROCEDURE — G0378 HOSPITAL OBSERVATION PER HR: HCPCS

## 2018-01-18 RX ADMIN — GABAPENTIN SCH MG: 300 CAPSULE ORAL at 21:48

## 2018-01-18 RX ADMIN — IBUPROFEN PRN MG: 400 TABLET, FILM COATED ORAL at 18:30

## 2018-01-18 RX ADMIN — ANASTROZOLE SCH MG: 1 TABLET, COATED ORAL at 18:34

## 2018-01-18 NOTE — HP
Admitting History and Physical





- Admission


Chief Complaint: fall at home on the buttocks and inability to stand up after  

that secondary to pain in the left lower extremity


History of Present Illness: 





86 yo female who has h/o Multiple sclerosis lost  her balance and fell at home. 

the patient fell on her buttocks without hitting her head. After the fall she 

was unable to stand up or walk due to pain in the left hip.She denies any 

dizziness associated with the episode.


History Source: Patient





- Past Medical History


CNS: Yes: Multiple Sclerosis


Cardiovascular: Yes: HTN, Hyperlipdemia.  No: AFIB, CAD, CHF


Gastrointestinal: Yes: Constipation


Renal/: Yes: Neurogenic Bladder


...Pregnant: No


Heme/Onc: Yes: Cancer (breast cancer)


Musculoskeletal: Yes: Osteoarthritis, Other (left lower extremity paresis)


Endocrine: Yes: Diabetes Mellitus





- Smoking History


Smoking history: Former smoker


Have you smoked in the past 12 months: No


Aproximately how many cigarettes per day: 0


If you are a former smoker, when did you quit?: 2009





- Alcohol/Substance Use


Hx Alcohol Use: No





- Social History


History of Recent Travel: No





Home Medications





- Allergies


Allergies/Adverse Reactions: 


 Allergies











Allergy/AdvReac Type Severity Reaction Status Date / Time


 


Penicillins Allergy  Rash Verified 06/23/17 11:05


 


meperidine HCl [From Demerol] AdvReac   Verified 01/18/18 11:47














- Home Medications


Home Medications: 


Ambulatory Orders





Aspirin [Aspirin EC] 81 mg PO DAILY 01/10/17 


Beta-Carotene(A) W-C and E/Min [Ocuvite (Nf) -] 1 tab PO DAILY 01/10/17 


Gabapentin 100 mg PO TID 01/10/17 


Glipizide 5 mg PO HS 01/10/17 


Levothyroxine [Synthroid -] 100 mcg PO DAILY 01/10/17 


Losartan/Hydrochlorothiazide [Losartan-Hctz 100-25 mg Tab] 1 each PO DAILY 01/10

/17 


Simvastatin 10 mg PO HS 01/10/17 


Arimidex - 1 mg PO DAILY 01/18/18 











Family Disease History





- Family Disease History


Family Disease History: Heart Disease: Father (fatal MI 63), Other: Son (2 sons 

with multiple sclerosis)





Review of Systems





- Review of Systems


Constitutional: reports: No Symptoms


Eyes: reports: No Symptoms


HENT: reports: No Symptoms


Neck: reports: No Symptoms


Cardiovascular: reports: No Symptoms


Respiratory: reports: No Symptoms


Gastrointestinal: reports: No Symptoms


Genitourinary: reports: Frequency, Incontinence, Urgency


Breasts: reports: No Symptoms Reported


Endocrine: reports: No Symptoms


Hematology/Lymphatic: reports: No Symptoms


Psychiatric: reports: No Symptoms





Physical Examination


Vital Signs: 


 Vital Signs











Temperature  97.8 F   01/18/18 14:07


 


Pulse Rate  90   01/18/18 14:07


 


Respiratory Rate  18   01/18/18 21:00


 


Blood Pressure  146/48   01/18/18 14:07


 


O2 Sat by Pulse Oximetry (%)  95   01/18/18 14:07











Constitutional: Yes: Well Nourished, No Distress, Calm


Eyes: Yes: Conjunctiva Clear, EOM Intact


HENT: Yes: Atraumatic, Normocephalic


Neck: Yes: Supple, Trachea Midline


Cardiovascular: Yes: Regular Rate and Rhythm, S1, S2


Respiratory: Yes: Regular, CTA Bilaterally


Gastrointestinal: Yes: Normal Bowel Sounds, Soft.  No: Abdomen, Obese


...Rectal Exam: Yes: Deferred


Breast(s): Yes: WNL


Musculoskeletal: Yes: Muscle Weakness


Extremities: No: Calf Tenderness


Edema: No


Peripheral Pulses WNL: Yes


Neurological: Yes: Alert, Oriented


...Motor Strength: LUE, LLE (weakness bilaterally in bothe lower extremiteis)


Psychiatric: Yes: Alert, Oriented


Labs: 


 CBC, BMP





 01/18/18 07:15 





 01/18/18 07:06 











Imaging





- Results


Chest X-ray: Other (X ray of the left hip no fracture or disclocation)





Problem List





- Problems


(1) Contusion of pelvis


Assessment/Plan: 


Motrin 400 mg pi Qid


 I will monitor response to pain medications


Code(s): S30.0XXA - CONTUSION OF LOWER BACK AND PELVIS, INITIAL ENCOUNTER   


Qualifiers: 


   Encounter type: initial encounter   Qualified Code(s): S30.0XXA - Contusion 

of lower back and pelvis, initial encounter   





(2) Multiple sclerosis


Assessment/Plan: 


continue Neurontin


Code(s): G35 - MULTIPLE SCLEROSIS   





(3) Diabetes mellitus


Assessment/Plan: 


Glucotrol 5 mg po daily


Code(s): E11.9 - TYPE 2 DIABETES MELLITUS WITHOUT COMPLICATIONS   


Qualifiers: 


   Diabetes mellitus complication detail: with unspecified neuropathy   

Diabetes mellitus long term insulin use: without long term use 





(4) HTN (hypertension)


Assessment/Plan: 


Losartan/HCTZ


Code(s): I10 - ESSENTIAL (PRIMARY) HYPERTENSION   





(5) Diabetes mellitus type 2 in obese


Assessment/Plan: 


continue Glucophage XR


Code(s): E11.69 - TYPE 2 DIABETES MELLITUS WITH OTHER SPECIFIED COMPLICATION; 

E66.9 - OBESITY, UNSPECIFIED   





(6) Breast cancer, left


Assessment/Plan: 


Continue Arimidex 1mg po daiy


Code(s): C50.912 - MALIGNANT NEOPLASM OF UNSPECIFIED SITE OF LEFT FEMALE BREAST

## 2018-01-18 NOTE — PDOC
*Physical Exam





- Vital Signs


 Last Vital Signs











Temp Pulse Resp BP Pulse Ox


 


 97.9 F   81   14   149/64   95 


 


 01/18/18 05:14  01/18/18 05:14  01/18/18 05:14  01/18/18 05:14  01/18/18 05:14














- Physical Exam


General Appearance: Yes: Nourished, Obese


HEENT: positive: JUANA


Neck: positive: Supple


Respiratory/Chest: positive: Lungs Clear, Normal Breath Sounds





**Heart Score/ECG Review





- ECG Intrepretation


Rhythm: Regular Rhythm





- QRS


Widened: RBBB


Poor R Wave Progression: No





ED Treatment Course





- LABORATORY


CBC & Chemistry Diagram: 


 01/18/18 07:15





 01/18/18 07:06





- ADDITIONAL ORDERS


Additional order review: 


 Laboratory  Results











  01/18/18 01/18/18





  07:06 07:06


 


Sodium   142


 


Potassium   4.0


 


Chloride   101


 


Carbon Dioxide   31 H


 


Anion Gap   10


 


BUN   41 H D


 


Creatinine   1.4 H


 


Creat Clearance w eGFR   35.57


 


Calcium   9.7


 


Total Bilirubin   0.4  D


 


AST   25


 


ALT   13


 


Alkaline Phosphatase   101 H


 


Creatine Kinase  198 H 


 


Total Protein   7.6


 


Albumin   4.0














*DC/Admit/Observation/Transfer


Diagnosis at time of Disposition: 


 Multiple sclerosis





Contusion of pelvis


Qualifiers:


 Encounter type: initial encounter Qualified Code(s): S30.0XXA - Contusion of 

lower back and pelvis, initial encounter





Rhabdomyolysis


Qualifiers:


 Rhabdomyolysis type: traumatic Encounter type: initial encounter Qualified Code

(s): T79.6XXA - Traumatic ischemia of muscle, initial encounter








- Discharge Dispostion


Condition at time of disposition: Stable


Admit: Yes





- Referrals


Referrals: 


Melania Lemons MD [Primary Care Provider] - 





- Patient Instructions





- Post Discharge Activity

## 2018-01-18 NOTE — PDOC
History of Present Illness





- History of Present Illness


Initial Comments: 





01/18/18 05:40


This 87-year-old woman with a history of hypertension/DM/hypothyroidism is 

brought in by ambulance with a history of fall.  Patient was using her walker 

to go to the bathroom when she lost her balance and fell.  No loss of 

consciousness; patient denies hitting her head or neck.  She has pain in the 

left anterior proximal thigh.  Patient contacted a neighbor and EMS was called: 

Proximal left thigh pain is worsened with weightbearing.  No previous history 

of hip or pelvis injury.





<Pearl Mackey - Last Filed: 01/18/18 07:10>





- General


History Source: Patient





<Georgie Jesus - Last Filed: 01/19/18 13:56>





- General


Chief Complaint: Injury


Stated Complaint: BIBA, S/P FALL L PELVIC PAIN


Time Seen by Provider: 01/18/18 05:37





Past History





- Past Medical History


Anemia: No


Asthma: No


Cancer: Yes (LEFT BREAST)


CVA: No


COPD: No


Dementia: No


Diabetes: Yes


GI Disorders: No


 Disorders: No


HTN: Yes


Hypercholesterolemia: Yes


Liver Disease: No


Seizures: Yes


Thyroid Disease: No





- Surgical History


Appendectomy: Yes





- Suicide/Smoking/Psychosocial Hx


Smoking History: Former smoker


Have you smoked in the past 12 months: No


Number of Cigarettes Smoked Daily: 0


If you are a former smoker, when did you quit?: 2009


Information on smoking cessation initiated: No


Hx Alcohol Use: No


Drug/Substance Use Hx: No


Substance Use Type: None


Hx Substance Use Treatment: No





<Pearl Mackey - Last Filed: 01/18/18 07:10>





- Travel


Traveled outside of the country in the last 30 days: No


Close contact w/someone who was outside of country & ill: No





<Georgie Jesus - Last Filed: 01/19/18 13:56>





- Past Medical History


Allergies/Adverse Reactions: 


 Allergies











Allergy/AdvReac Type Severity Reaction Status Date / Time


 


Penicillins Allergy  Rash Verified 06/23/17 11:05


 


meperidine HCl [From Demerol] AdvReac   Verified 01/18/18 11:47











Home Medications: 


Ambulatory Orders





Aspirin [Aspirin EC] 81 mg PO DAILY 01/10/17 


Beta-Carotene(A) W-C and E/Min [Ocuvite (Nf) -] 1 tab PO DAILY 01/10/17 


Gabapentin 100 mg PO TID 01/10/17 


Glipizide 5 mg PO HS 01/10/17 


Levothyroxine [Synthroid -] 100 mcg PO DAILY 01/10/17 


Losartan/Hydrochlorothiazide [Losartan-Hctz 100-25 mg Tab] 1 each PO DAILY 01/10

/17 


Simvastatin 10 mg PO HS 01/10/17 


Arimidex - 1 mg PO DAILY 01/18/18 


Acetaminophen [Tylenol .Regular Strength -] 650 mg PO Q6H PRN  tablet 01/19/18 


Anastrozole [Arimidex -] 1 mg PO DAILY  tablet 01/19/18 


Gabapentin [Neurontin -] 300 mg PO TID  capsule 01/19/18 


Glipizide Xl [Glucotrol Xl -] 5 mg PO DAILY@0700  tab.er.24 01/19/18 


Ibuprofen [Motrin -] 400 mg PO Q6H PRN  tablet 01/19/18 


Levothyroxine [Synthroid -] 100 mcg GT DAILY@0700  tablet 01/19/18 


Losartan 50Mg/Hctz 12.5MG [Hyzaar -] 1 tab PO DAILY  tablet 01/19/18 











**Review of Systems





- Review of Systems


Able to Perform ROS?: Yes


Comments:: 





12 point review of systems is negative except for what is noted in the history 

of present illness








<Pearl Mackey - Last Filed: 01/18/18 07:10>





- Review of Systems


Constitutional: Yes: Symptoms Reported, Weakness


Respiratory: Yes: See HPI





<Georgie Jesus - Last Filed: 01/19/18 13:56>





*Physical Exam





- Vital Signs


 Last Vital Signs











Temp Pulse Resp BP Pulse Ox


 


 97.9 F   81   14   149/64   95 


 


 01/18/18 05:14  01/18/18 05:14  01/18/18 05:14  01/18/18 05:14  01/18/18 05:14














- Physical Exam


Comments: 





GENERAL: Elderly female, alert and oriented 3, in no acute distress


HEAD: Normal with no signs of trauma.


EYES: PERRLA, EOMI, sclera anicteric, conjunctiva clear.


ENT: Ears normal, nares patent, oropharynx clear without exudates.  Dry mucous 

membranes.


NECK: Normal range of motion, supple without lymphadenopathy, JVD, or masses.


LUNGS: Breath sounds equal, clear to auscultation bilaterally.  No wheezes, and 

no crackles.


HEART:Regular rate and rhythm, normal S1 and S2 without murmur, rub or gallop.


ABDOMEN:.normal bowel sounds  No guarding,tenderness or rebound.No masses No 

distention. 


EXTREMITIES: Left lower extremity-mild tenderness to palpation proximal aspect; 

no deformity or edema; full pain free range of motion of left hip 


                                                             No shortening of 

left leg; no rotation of foot


                                                                 No other 

abnormality of the left lower extremity


                         Extremity exam otherwise negative


NEUROLOGICAL: Cranial nerves II through XII grossly intact.  Normal speech.  No 

focal 


neurological deficits. 


MUSCULOSKELETAL:  Back non-tender to palpation, no CVA tenderness


SKIN: Warm, Dry, normal turgor, no rashes or lesions noted.








<Pearl Mackey - Last Filed: 01/18/18 07:10>





- Vital Signs


 Last Vital Signs











Temp Pulse Resp BP Pulse Ox


 


 97.9 F   81   14   149/64   95 


 


 01/18/18 05:14  01/18/18 05:14  01/18/18 05:14  01/18/18 05:14  01/18/18 05:14














- Physical Exam


General Appearance: Yes: Nourished, Appropriately Dressed, Moderate Distress, 

Obese


HEENT: positive: JUANA


Neck: positive: Supple


Respiratory/Chest: positive: Lungs Clear


Gastrointestinal/Abdominal: positive: Normal Bowel Sounds, Soft


Neurologic: positive: Fully Oriented, Alert, Normal Mood/Affect





<Georgie Jesus S - Last Filed: 01/19/18 13:56>





ED Treatment Course





- LABORATORY


CBC & Chemistry Diagram: 


 01/18/18 07:15





 01/19/18 08:00





- ADDITIONAL ORDERS


Additional order review: 


 Laboratory  Results











  01/18/18 01/18/18





  07:06 07:06


 


Sodium   142


 


Potassium   4.0


 


Chloride   101


 


Carbon Dioxide   31 H


 


Anion Gap   10


 


BUN   41 H D


 


Creatinine   1.4 H


 


Creat Clearance w eGFR   35.57


 


Calcium   9.7


 


Total Bilirubin   0.4  D


 


AST   25


 


ALT   13


 


Alkaline Phosphatase   101 H


 


Creatine Kinase  198 H 


 


Total Protein   7.6


 


Albumin   4.0














<Georgie Jesus S - Last Filed: 01/19/18 13:56>





Progress Note





- Progress Note


Progress Note: 





Plain film of left hip/pelvis shows no clear indication of fracture dislocation.





Patient asked if she could empty her bladder: Commode was brought to bedside.  

Patient had significant pain on weightbearing.


Because of her pain on weightbearing, patient needs admission to fully rule out 

acute fracture or hip/pelvis.





Message left for Dr. Lemons at 6:40 AM





<Pearl Mackey - Last Filed: 01/18/18 07:10>





Medical Decision Making





- Medical Decision Making





01/18/18 07:10


Case signed out to Dr. Jesus at end of shift.





<Pearl Mackey - Last Filed: 01/18/18 07:10>





*DC/Admit/Observation/Transfer





<Pearl Mackey - Last Filed: 01/18/18 07:10>





- Discharge Dispostion


Admit: Yes





<Georgie Jesus - Last Filed: 01/19/18 13:56>


Diagnosis at time of Disposition: 


 Multiple sclerosis





Contusion of pelvis


Qualifiers:


 Encounter type: initial encounter Qualified Code(s): S30.0XXA - Contusion of 

lower back and pelvis, initial encounter





Rhabdomyolysis


Qualifiers:


 Rhabdomyolysis type: traumatic Encounter type: initial encounter Qualified Code

(s): T79.6XXA - Traumatic ischemia of muscle, initial encounter








- Discharge Dispostion


Condition at time of disposition: Stable

## 2018-01-19 VITALS — TEMPERATURE: 98.2 F | SYSTOLIC BLOOD PRESSURE: 120 MMHG | DIASTOLIC BLOOD PRESSURE: 44 MMHG | HEART RATE: 84 BPM

## 2018-01-19 LAB
ALBUMIN SERPL-MCNC: 3.4 G/DL (ref 3.5–5)
ALP SERPL-CCNC: 90 U/L (ref 32–92)
ALT SERPL-CCNC: 10 U/L (ref 10–40)
ANION GAP SERPL CALC-SCNC: 9 MMOL/L (ref 8–16)
AST SERPL-CCNC: 19 U/L (ref 10–42)
BILIRUB SERPL-MCNC: 1 MG/DL (ref 0.2–1)
BUN SERPL-MCNC: 30 MG/DL (ref 7–18)
CALCIUM SERPL-MCNC: 9 MG/DL (ref 8.4–10.2)
CHLORIDE SERPL-SCNC: 101 MMOL/L (ref 98–107)
CO2 SERPL-SCNC: 29 MMOL/L (ref 22–28)
CREAT SERPL-MCNC: 1.2 MG/DL (ref 0.6–1.3)
GLUCOSE SERPL-MCNC: 106 MG/DL (ref 74–106)
POTASSIUM SERPLBLD-SCNC: 4.2 MMOL/L (ref 3.5–5.1)
PROT SERPL-MCNC: 6.6 G/DL (ref 6.4–8.3)
SODIUM SERPL-SCNC: 139 MMOL/L (ref 136–145)

## 2018-01-19 RX ADMIN — IBUPROFEN PRN MG: 400 TABLET, FILM COATED ORAL at 11:08

## 2018-01-19 RX ADMIN — GABAPENTIN SCH: 300 CAPSULE ORAL at 09:39

## 2018-01-19 RX ADMIN — GABAPENTIN SCH MG: 300 CAPSULE ORAL at 15:23

## 2018-01-19 RX ADMIN — ANASTROZOLE SCH MG: 1 TABLET, COATED ORAL at 09:43

## 2018-01-19 NOTE — DS
Physical Examination


Vital Signs: 


 Vital Signs











Temperature  98.3 F   01/19/18 09:44


 


Pulse Rate  88   01/19/18 09:44


 


Respiratory Rate  18   01/19/18 09:44


 


Blood Pressure  129/55   01/19/18 09:44


 


O2 Sat by Pulse Oximetry (%)  95   01/19/18 06:00











Constitutional: Yes: No Distress, Calm


Eyes: Yes: Conjunctiva Clear, EOM Intact


HENT: Yes: Atraumatic, Normocephalic


Neck: Yes: Supple, Trachea Midline


Cardiovascular: Yes: Regular Rate and Rhythm, S1, S2


Respiratory: Yes: Regular, CTA Bilaterally


Gastrointestinal: Yes: Normal Bowel Sounds, Soft, Abdomen, Obese.  No: 

Hepatomegaly, Splenomegaly


...Rectal Exam: Yes: Deferred


Musculoskeletal: Yes: Other (pain to palpation of the left inguinsla area)


Extremities: No: Calf Tenderness


Edema: No


Peripheral Pulses WNL: Yes


Neurological: Yes: Alert, Oriented


Psychiatric: Yes: Alert, Oriented


Labs: 


 CBC, BMP





 01/18/18 07:15 





 01/19/18 08:00 











Discharge Summary


Reason For Visit: MULTIPLE SCLEROSIS/CONTUSION PELVIS RHABDOMYOLYSIS


Current Active Problems





Breast cancer, left (Acute)


Closed pelvic fracture (Acute)


Diabetes mellitus type 2 in obese (Acute)


Multiple sclerosis (Chronic)








Hospital Course: 





86 yo female with PMH of Multiple Sclerosis and right breast cancer, fell at 

home and developed a left pubic ramus fracture. She is unable to ambulate 

safely due to pain and weakness and she will be transferred  for subacute 

rehabilitation.


WBAT left lower extremity


Condition: Stable





- Instructions


Referrals: 


Melania Lemons MD [Primary Care Provider] - 





- Home Medications


Comprehensive Discharge Medication List: 


Ambulatory Orders





Aspirin [Aspirin EC] 81 mg PO DAILY 01/10/17 


Beta-Carotene(A) W-C and E/Min [Ocuvite (Nf) -] 1 tab PO DAILY 01/10/17 


Gabapentin 100 mg PO TID 01/10/17 


Glipizide 5 mg PO HS 01/10/17 


Levothyroxine [Synthroid -] 100 mcg PO DAILY 01/10/17 


Losartan/Hydrochlorothiazide [Losartan-Hctz 100-25 mg Tab] 1 each PO DAILY 01/10

/17 


Simvastatin 10 mg PO HS 01/10/17 


Arimidex - 1 mg PO DAILY 01/18/18 


Acetaminophen [Tylenol .Regular Strength -] 650 mg PO Q6H PRN  tablet 01/19/18 


Anastrozole [Arimidex -] 1 mg PO DAILY  tablet 01/19/18 


Gabapentin [Neurontin -] 300 mg PO TID  capsule 01/19/18 


Glipizide Xl [Glucotrol Xl -] 5 mg PO DAILY@0700  tab.er.24 01/19/18 


Ibuprofen [Motrin -] 400 mg PO Q6H PRN  tablet 01/19/18 


Levothyroxine [Synthroid -] 100 mcg GT DAILY@0700  tablet 01/19/18 


Losartan 50Mg/Hctz 12.5MG [Hyzaar -] 1 tab PO DAILY  tablet 01/19/18

## 2018-01-19 NOTE — PN
Progress Note, Physician


Chief Complaint: 





The patient's left inguinal area and hip pain are better since last evening. A 

CT scan of the hip and pelvis was done this morning and it diagnosed left 

medial ramus pubi fracture.The patient did not have her physical therapy 

evaluation yet.


History of Present Illness: 





86 yo female living alone with PMH of MS and ambulating using a walker, fell at 

home on her buttocks and developed left ramus pubi fracture. She is complaining 

of pain with ambulation and is unable to put pressure on her left lower 

extremity because of that.





- Current Medication List


Current Medications: 


Active Medications





Acetaminophen (Tylenol -)  650 mg PO Q6H PRN


   PRN Reason: PAIN LEVEL 1-5


Anastrozole (Arimidex -)  1 mg PO DAILY [facility]


   Last Admin: 01/19/18 09:43 Dose:  1 mg


Atorvastatin Calcium (Lipitor -)  10 mg PO HS [facility]


   Last Admin: 01/18/18 21:48 Dose:  10 mg


Gabapentin (Neurontin -)  300 mg PO TID [facility]


   Last Admin: 01/19/18 09:39 Dose:  Not Given


Glipizide (Glucotrol Xl -)  5 mg PO DAILY@0700 [facility]


   Last Admin: 01/19/18 09:39 Dose:  Not Given


HCTZ/Losartan Potassium (Hyzaar -)  1 tab PO DAILY [facility]


   Last Admin: 01/19/18 09:42 Dose:  1 tab


Ibuprofen (Motrin -)  400 mg PO Q6H PRN


   PRN Reason: MILD PAIN


   Last Admin: 01/18/18 18:30 Dose:  400 mg


Levothyroxine Sodium (Synthroid -)  100 mcg GT DAILY@0700 [facility]


   Last Admin: 01/19/18 09:39 Dose:  Not Given











- Objective


Vital Signs: 


 Vital Signs











Temperature  98.3 F   01/19/18 09:44


 


Pulse Rate  88   01/19/18 09:44


 


Respiratory Rate  18   01/19/18 09:44


 


Blood Pressure  129/55   01/19/18 09:44


 


O2 Sat by Pulse Oximetry (%)  95   01/19/18 06:00











Constitutional: Yes: No Distress, Calm


Eyes: Yes: Conjunctiva Clear, EOM Intact


HENT: Yes: Atraumatic, Normocephalic


Neck: Yes: Supple, Trachea Midline


Cardiovascular: Yes: Regular Rate and Rhythm, S1, S2


Respiratory: Yes: Regular, CTA Bilaterally


Gastrointestinal: Yes: Normal Bowel Sounds, Soft.  No: Hepatomegaly, 

Splenomegaly


Genitourinary: Yes: Other (urinary incontinence)


Musculoskeletal: Yes: Other (weakness of the lower etrxemuties)


Extremities: Yes: Other (pain to palpation of the left inguinal area)


Edema: No


Peripheral Pulses WNL: Yes


Neurological: Yes: Alert, Oriented


Psychiatric: Yes: Alert, Oriented


Labs: 


 CBC, BMP





 01/18/18 07:15 





 01/19/18 08:00 





 INR, PTT











INR  1.11  (0.82-1.09)   01/18/18  07:06    














- ....Imaging


Cat Scan: Other (Ct scan of the left medial ramus pubi nondisplaced)





Problem List





- Problems


(1) Closed pelvic fracture


Assessment/Plan: 


continue Motrin and Tylenol PRN


ambulate with a walker


PT, WBAT left lower extremity


Rehabilitation in patient


Code(s): S32.9XXA - FRACTURE OF UNSP PARTS OF LUMBOSACRAL SPINE AND PELVIS, 

INIT   





(2) Multiple sclerosis


Assessment/Plan: 


continue Neurontin


Code(s): G35 - MULTIPLE SCLEROSIS   





(3) Diabetes mellitus


Assessment/Plan: 


Glucotrol 5 mg po daily


Code(s): E11.9 - TYPE 2 DIABETES MELLITUS WITHOUT COMPLICATIONS   


Qualifiers: 


   Diabetes mellitus complication detail: with unspecified neuropathy   

Diabetes mellitus long term insulin use: without long term use 





(4) HTN (hypertension)


Assessment/Plan: 


Losartan/HCTZ, and controlled





Code(s): I10 - ESSENTIAL (PRIMARY) HYPERTENSION   





(5) Diabetes mellitus type 2 in obese


Assessment/Plan: 


continue Glucophage XR 5 mg daily


Code(s): E11.69 - TYPE 2 DIABETES MELLITUS WITH OTHER SPECIFIED COMPLICATION; 

E66.9 - OBESITY, UNSPECIFIED   





(6) Breast cancer, left


Assessment/Plan: 





recurrent


continue Arimidex 1mg po daily


Code(s): C50.912 - MALIGNANT NEOPLASM OF UNSPECIFIED SITE OF LEFT FEMALE BREAST

   





Assessment/Plan





86 yo female S/p fall with left pubic ramus fracture and h/o multiple 

Sclerosis. The patient is unsteady and not safe to go home with outpatient 

physical therapy. She will be transferred to Subacute Rehab

## 2018-01-22 NOTE — EKG
Test Reason : 

Blood Pressure : ***/*** mmHG

Vent. Rate : 088 BPM     Atrial Rate : 088 BPM

   P-R Int : 196 ms          QRS Dur : 154 ms

    QT Int : 428 ms       P-R-T Axes : 050 -61 009 degrees

   QTc Int : 517 ms

 

SINUS RHYTHM WITH PREMATURE ATRIAL COMPLEXES

LEFT AXIS DEVIATION

RIGHT BUNDLE BRANCH BLOCK

ABNORMAL ECG

WHEN COMPARED WITH ECG OF 23-JUN-2017 15:26,

PREMATURE ATRIAL COMPLEXES ARE NOW PRESENT

T wave abnormalities are no longer present in V2-3

Confirmed by EDGARDO CHAUHAN MD ([id number]) on 1/22/2018 4:56:00 PM

 

Referred By: CHLOE CEJA           Confirmed By:EDGARDO CHAUHAN MD

## 2018-09-28 PROBLEM — Z00.00 ENCOUNTER FOR PREVENTIVE HEALTH EXAMINATION: Status: ACTIVE | Noted: 2018-09-28

## 2018-10-04 ENCOUNTER — APPOINTMENT (OUTPATIENT)
Dept: BREAST CENTER | Facility: CLINIC | Age: 83
End: 2018-10-04
Payer: MEDICARE

## 2018-10-04 VITALS
DIASTOLIC BLOOD PRESSURE: 70 MMHG | HEIGHT: 63 IN | SYSTOLIC BLOOD PRESSURE: 126 MMHG | BODY MASS INDEX: 31.01 KG/M2 | WEIGHT: 175 LBS | HEART RATE: 95 BPM

## 2018-10-04 DIAGNOSIS — Z87.81 PERSONAL HISTORY OF (HEALED) TRAUMATIC FRACTURE: ICD-10-CM

## 2018-10-04 DIAGNOSIS — Z87.891 PERSONAL HISTORY OF NICOTINE DEPENDENCE: ICD-10-CM

## 2018-10-04 DIAGNOSIS — Z86.79 PERSONAL HISTORY OF OTHER DISEASES OF THE CIRCULATORY SYSTEM: ICD-10-CM

## 2018-10-04 DIAGNOSIS — Z63.4 DISAPPEARANCE AND DEATH OF FAMILY MEMBER: ICD-10-CM

## 2018-10-04 DIAGNOSIS — Z86.39 PERSONAL HISTORY OF OTHER ENDOCRINE, NUTRITIONAL AND METABOLIC DISEASE: ICD-10-CM

## 2018-10-04 DIAGNOSIS — Z82.49 FAMILY HISTORY OF ISCHEMIC HEART DISEASE AND OTHER DISEASES OF THE CIRCULATORY SYSTEM: ICD-10-CM

## 2018-10-04 DIAGNOSIS — Z80.0 FAMILY HISTORY OF MALIGNANT NEOPLASM OF DIGESTIVE ORGANS: ICD-10-CM

## 2018-10-04 PROCEDURE — 99214 OFFICE O/P EST MOD 30 MIN: CPT

## 2018-10-04 RX ORDER — SIMVASTATIN 10 MG/1
10 TABLET, FILM COATED ORAL
Refills: 0 | Status: ACTIVE | COMMUNITY

## 2018-10-04 RX ORDER — POTASSIUM CHLORIDE 1.5 G/1
20 POWDER, FOR SOLUTION ORAL
Refills: 0 | Status: ACTIVE | COMMUNITY

## 2018-10-04 RX ORDER — LEVOTHYROXINE SODIUM 0.15 MG/1
150 TABLET ORAL
Refills: 0 | Status: ACTIVE | COMMUNITY

## 2018-10-04 RX ORDER — ASPIRIN 81 MG
81 TABLET,CHEWABLE ORAL
Refills: 0 | Status: ACTIVE | COMMUNITY

## 2018-10-04 RX ORDER — LOSARTAN POTASSIUM AND HYDROCHLOROTHIAZIDE 25; 100 MG/1; MG/1
100-25 TABLET ORAL
Refills: 0 | Status: ACTIVE | COMMUNITY

## 2018-10-04 RX ORDER — GABAPENTIN 300 MG/1
300 CAPSULE ORAL
Refills: 0 | Status: ACTIVE | COMMUNITY

## 2018-10-04 RX ORDER — ANASTROZOLE TABLETS 1 MG/1
1 TABLET ORAL
Refills: 0 | Status: ACTIVE | COMMUNITY

## 2018-10-04 SDOH — SOCIAL STABILITY - SOCIAL INSECURITY: DISSAPEARANCE AND DEATH OF FAMILY MEMBER: Z63.4

## 2018-12-05 ENCOUNTER — HOSPITAL ENCOUNTER (OUTPATIENT)
Dept: HOSPITAL 74 - FER | Age: 83
Setting detail: OBSERVATION
LOS: 5 days | Discharge: NURSING, REHAB OR LONG-TERM CARE | End: 2018-12-10
Attending: INTERNAL MEDICINE | Admitting: INTERNAL MEDICINE
Payer: COMMERCIAL

## 2018-12-05 VITALS — BODY MASS INDEX: 35.1 KG/M2
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
## 2018-12-05 DIAGNOSIS — I10: ICD-10-CM
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
## 2018-12-05 DIAGNOSIS — J20.9: ICD-10-CM

## 2018-12-05 DIAGNOSIS — R26.89: ICD-10-CM

## 2018-12-05 DIAGNOSIS — E66.9: ICD-10-CM

## 2018-12-05 DIAGNOSIS — E11.9: ICD-10-CM

## 2018-12-05 DIAGNOSIS — S80.12XA: ICD-10-CM

## 2018-12-05 DIAGNOSIS — Z88.8: ICD-10-CM
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
## 2018-12-05 DIAGNOSIS — N30.00: Primary | ICD-10-CM

## 2018-12-05 DIAGNOSIS — C50.912: ICD-10-CM

## 2018-12-05 DIAGNOSIS — Z87.891: ICD-10-CM

## 2018-12-05 DIAGNOSIS — Z79.84: ICD-10-CM
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
## 2018-12-05 DIAGNOSIS — E03.9: ICD-10-CM

## 2018-12-05 DIAGNOSIS — M19.90: ICD-10-CM

## 2018-12-05 DIAGNOSIS — Z88.0: ICD-10-CM

## 2018-12-05 DIAGNOSIS — B96.89: ICD-10-CM

## 2018-12-05 DIAGNOSIS — S92.332A: ICD-10-CM

## 2018-12-05 DIAGNOSIS — E78.00: ICD-10-CM

## 2018-12-05 DIAGNOSIS — Z99.89: ICD-10-CM

## 2018-12-05 LAB
ALBUMIN SERPL-MCNC: 3.5 G/DL (ref 3.5–5)
ALP SERPL-CCNC: 94 U/L (ref 32–92)
ALT SERPL-CCNC: 10 U/L (ref 10–40)
AMORPH PHOS CRY URNS QL MICRO: (no result) /HPF
ANION GAP SERPL CALC-SCNC: 8 MMOL/L (ref 8–16)
AST SERPL-CCNC: 18 U/L (ref 10–42)
BACTERIA #/AREA URNS HPF: (no result) /HPF
BASOPHILS # BLD: 0.7 % (ref 0–2)
BILIRUB SERPL-MCNC: 0.4 MG/DL (ref 0.2–1)
BUN SERPL-MCNC: 37 MG/DL (ref 7–18)
CALCIUM SERPL-MCNC: 9.2 MG/DL (ref 8.4–10.2)
CHLORIDE SERPL-SCNC: 101 MMOL/L (ref 98–107)
CO2 SERPL-SCNC: 30 MMOL/L (ref 22–28)
CREAT SERPL-MCNC: 1.4 MG/DL (ref 0.6–1.3)
DEPRECATED RDW RBC AUTO: 13.9 % (ref 11.6–15.6)
EOSINOPHIL # BLD: 3.4 % (ref 0–4.5)
EPITH CASTS URNS QL MICRO: (no result) /HPF
GLUCOSE SERPL-MCNC: 85 MG/DL (ref 74–106)
HCT VFR BLD CALC: 37.6 % (ref 32.4–45.2)
HGB BLD-MCNC: 12 GM/DL (ref 10.7–15.3)
LEUKOCYTE ESTERASE UR QL STRIP.AUTO: (no result)
LYMPHOCYTES # BLD: 17.5 % (ref 8–40)
MCH RBC QN AUTO: 28.6 PG (ref 25.7–33.7)
MCHC RBC AUTO-ENTMCNC: 31.8 G/DL (ref 32–36)
MCV RBC: 90 FL (ref 80–96)
MONOCYTES # BLD AUTO: 6.9 % (ref 3.8–10.2)
NEUTROPHILS # BLD: 71.5 % (ref 42.8–82.8)
PH UR: 7 [PH] (ref 4.5–8)
PLATELET # BLD AUTO: 310 K/MM3 (ref 134–434)
PMV BLD: 10.6 FL (ref 7.5–11.1)
POTASSIUM SERPLBLD-SCNC: 4.4 MMOL/L (ref 3.5–5.1)
PROT SERPL-MCNC: 7.1 G/DL (ref 6.4–8.3)
RBC # BLD AUTO: (no result) /HPF (ref 0–3)
RBC # BLD AUTO: 4.18 M/MM3 (ref 3.6–5.2)
SODIUM SERPL-SCNC: 139 MMOL/L (ref 136–145)
SP GR UR: 1.01 (ref 1.01–1.03)
UROBILINOGEN UR STRIP-MCNC: 0.2 MG/DL (ref 0.2–1)
WBC # BLD AUTO: 11.1 K/MM3 (ref 4–10.8)

## 2018-12-05 PROCEDURE — G0378 HOSPITAL OBSERVATION PER HR: HCPCS

## 2018-12-05 NOTE — PDOC
History of Present Illness





- General


History Source: Patient


Exam Limitations: No Limitations





- History of Present Illness


Initial Comments: 





18 22:20


The patient is a 88 year old female, with a significant past medical history of 

multiple falls, hypertension, hyperlipidemia, DM, breast cancer (s/p lumpectomy-

left), and hypothyroidism, who presents to the emergency department via EMS s/p 

mechanical fall. As per patient, she was walking into her kitchen, going over a 

small bump, when she felt her left leg give out. She notes falling down onto 

her buttocks and left leg. Patient endorses that her left leg has been giving 

out for a significant amount of time and feeling shaky after her fall. She 

was unable to ambulate with the assistance of her walker without shaking s/p 

fall, prompting her to call for EMS.  





She denies hitting her head/neck. She denies any loss of consciousness. She 

denies recent fevers, chills, headache or dizziness. She denies recent nausea, 

vomit, diarrhea or constipation. She denies recent  dysuria, frequency, urgency 

or hematuria. She denies recent chest pain or shortness of breath.





Allergies: Penicillins, meperidine HCl 


Past surgical history: Appendectomy.


Social history: Lives alone, ambulates with the assistance of a walker. Former 

smoker (Quit ). Denies EtOH use and recreational drug use. 


Primary Care Physician: Dr. Lemons 








<Luigi Hernandez - Last Filed: 18 22:44>





<Pearl Mackey - Last Filed: 18 01:25>





- General


Chief Complaint: Injury


Stated Complaint: BIBA, S/P FALL


Time Seen by Provider: 18 21:36





Past History





<Luigi Hernandez - Last Filed: 18 22:44>





- Past Medical History


Anemia: No


Asthma: No


Cancer: Yes (LEFT BREAST)


Cardiac Disorders: Yes (HTN)


CVA: No


COPD: No


CHF: No


Dementia: No


Diabetes: Yes


GI Disorders: No


 Disorders: No


HTN: Yes


Hypercholesterolemia: Yes


Liver Disease: No


Seizures: Yes


Thyroid Disease: No





- Surgical History


Abdominal Surgery: No


Appendectomy: Yes


Cardiac Surgery: No


Lung Surgery: No


Neurologic Surgery: No


Orthopedic Surgery: Yes (left ankle with hardware)





- Suicide/Smoking/Psychosocial Hx


Smoking History: Unknown if ever smoked


Have you smoked in the past 12 months: No


Number of Cigarettes Smoked Daily: 0


If you are a former smoker, when did you quit?: 


Information on smoking cessation initiated: No


Hx Alcohol Use: No


Drug/Substance Use Hx: No


Substance Use Type: None


Hx Substance Use Treatment: No





<Pearl Mackey - Last Filed: 18 01:25>





- Past Medical History


Allergies/Adverse Reactions: 


 Allergies











Allergy/AdvReac Type Severity Reaction Status Date / Time


 


Penicillins Allergy  Rash Verified 17 11:05


 


meperidine HCl [From Demerol] AdvReac   Verified 18 11:47











Home Medications: 


Ambulatory Orders





Beta-Carotene(A) W-C and E/Min [Ocuvite (Nf) -] 1 tab PO DAILY 01/10/17 


Simvastatin 10 mg PO HS 01/10/17 


Acetaminophen [Tylenol .Regular Strength -] 650 mg PO Q6H PRN  tablet 18 


Anastrozole [Arimidex -] 1 mg PO DAILY  tablet 18 


Gabapentin [Neurontin -] 300 mg PO TID  capsule 18 


Glipizide Xl [Glucotrol Xl -] 5 mg PO DAILY@0700  tab.er.24 18 


Ibuprofen [Motrin -] 400 mg PO Q6H PRN  tablet 18 


Levothyroxine [Synthroid -] 100 mcg GT DAILY@0700  tablet 18 


Losartan 50Mg/Hctz 12.5MG [Hyzaar -] 1 tab PO DAILY  tablet 18 











**Review of Systems





- Review of Systems


Able to Perform ROS?: Yes


Comments:: 





18 22:20


CONSTITUTIONAL:


Absent: fever, no chills, no fatigue


EYES:


Absent: visual changes


ENT:


Absent: ear pain, no sore throat


CARDIOVASCULAR:


Absent: chest pain, no palpitations


RESPIRATORY:


Absent: cough, no SOB


GI:


Absent: abdominal pain, no nausea, no vomiting, no constipation, no diarrhea


GENITOURINARY:


Absent: dysuria, no frequency, no hematuria


MUSKULOSKELETAL:


Present: LLE weakness. 


Absent: back pain


SKIN:


Absent: rash


NEURO:


Absent: headache





All Other Systems: Reviewed and Negative





<Luigi Hernandez - Last Filed: 18 22:44>





*Physical Exam





- Vital Signs


 Last Vital Signs











Temp Pulse Resp BP Pulse Ox


 


 97.9 F   100 H  14   146/78   96 


 


 18 21:36  18 21:36  18 21:36  18 21:36  18 21:36














- Physical Exam


Comments: 





18 22:44


GENERAL: The patient is awake, alert, and fully oriented, in no acute distress.


HEAD: Normal with no signs of trauma.


EYES: Pupils equal, round and reactive to light, extraocular movements intact, 

sclera anicteric, conjunctiva clear with no pallor.


ENT: Ears normal, nares patent, oropharynx clear without exudates.  Moist 

mucous membranes.


NECK: Normal range of motion, supple without lymphadenopathy, JVD, or masses.


LUNGS: Breath sounds equal, clear to auscultation bilaterally.  No wheeze/

crackles.


HEART: Regular rate and rhythm, normal S1 and S2 without murmur or rub.


ABDOMEN: Soft/nontender/nondistended. BS wnl.  No guarding or rebound.  No 

palpable masses. No hepatosplenomegaly.


EXTREMITIES: No tenderness to pelvis or bilateral hips. Mild tenderness to 

palpation to left knee without ecchymosis or significant edema.


Left lecm x6cm slightly warm to touch min donn min leroy area of the anterior 

lower leg without evidence of skin breakage or ulcers.


NEUROLOGICAL: Cranial nerves II through XII grossly intact.  Normal speech, 

normal gait.


PSYCH: Normal mood, normal affect.


SKIN: Warm, Dry, normal turgor, no rashes or lesions noted.








<Luigi Hernandez - Last Filed: 18 22:44>





- Vital Signs


 Last Vital Signs











Temp Pulse Resp BP Pulse Ox


 


 97.9 F   100 H  14   146/78   96 


 


 18 21:36  18 21:36  18 21:36  18 21:36  18 21:36














- Physical Exam


Comments: 








18 12-lead electrocardiogram is performed: Normal sinus rhythm at 109 

bpm. there is right bundle branch block present.  No significant changes as 

compared to EKG performed on 18








<Pearl Mackey - Last Filed: 18 01:25>





Moderate Sedation





- Procedure Monitoring


Vital Signs: 


Procedure Monitoring Vital Signs











Temperature  97.9 F   18 21:36


 


Pulse Rate  100 H  18 21:36


 


Respiratory Rate  14   18 21:36


 


Blood Pressure  146/78   18 21:36


 


O2 Sat by Pulse Oximetry (%)  96   18 21:36











<Luigi Hernandez - Last Filed: 18 22:44>





- Procedure Monitoring


Vital Signs: 


Procedure Monitoring Vital Signs











Temperature  97.9 F   18 21:36


 


Pulse Rate  100 H  18 21:36


 


Respiratory Rate  14   18 21:36


 


Blood Pressure  146/78   18 21:36


 


O2 Sat by Pulse Oximetry (%)  96   18 21:36











<Pearl Mackey - Last Filed: 18 01:25>





ED Treatment Course





- LABORATORY


CBC & Chemistry Diagram: 


 18 22:25





 18 22:12





<Luigi Hernandez - Last Filed: 18 22:44>





- LABORATORY


CBC & Chemistry Diagram: 


 18 22:25





 18 22:12





<Pearl Mackey - Last Filed: 18 01:25>





Progress Note





- Progress Note


Progress Note: 





I, Dr Pearl Mackey, attest that this document has been prepared under my 

direction and personally reviewed by me in its entirety.   I further attest, 

that it accurately reflects all work, treatment, procedures and medical decision

-making performed by me.  





<Pearl Mackey - Last Filed: 18 01:25>





Medical Decision Making





- Medical Decision Making





As noted above, this 88-year-old woman multiple medical problems including MS/

DM brought in by ambulance with a history of fall at home.  Patient was walking 

with her walker fell onto buttocks after her left leg "gave out" on her.  EMS 

was called and patient felt weak and shaky when brought to her feet.  No 

significant pain noted .  Exam as noted.








Laboratory workup notable for slight increase in her white blood cell count at 

11,100 (moderate left shift).  Chemistry profile reveals 


BUN 37/creatinine 1.4(very similar to previous values).  Random glucose was 85.


Clean catch urinalysis notable for 2+LE with micro-of 02 RBC/1020 WBCs/few epi

/3+ bacteria; urine C&S sent





Left knee/left hip with pelvis x-rays performed: Preliminary interpretation-no 

evidence of fracture or dislocation





Clinical presentation consistent with UTI in an elderly patient with underlying 

gait disturbances.  Since patient feels weak at this time, lives alone and is 

not accompanied by family or friends, discharge home now would be unsafe.  The 

patient's UTI will be treated  (Rocephin 500 mg IV now; her penicillin ALLERGY 

was a rash and has tolerated cephalosporins without allergic reactions in the 

past )





Patient's PMD, Dr. Lemons, contacted; patient will be admitted to her service.





<Pearl Mackey - Last Filed: 18 01:25>





*DC/Admit/Observation/Transfer





- Attestations


Scribe Attestion: 





18 22:22





Documentation prepared by Luigi Hernandez, acting as medical scribe for Pearl Mackey MD.





<Luigi Hernandez - Last Filed: 18 22:44>





- Discharge Dispostion


Decision to Admit order: Yes





<Pearl Mackey - Last Filed: 18 01:25>


Diagnosis at time of Disposition: 


 Weakness





UTI (urinary tract infection)


Qualifiers:


 Urinary tract infection type: acute cystitis Hematuria presence: without 

hematuria Qualified Code(s): N30.00 - Acute cystitis without hematuria








- Discharge Dispostion


Condition at time of disposition: Stable

## 2018-12-06 PROCEDURE — 3E013GC INTRODUCTION OF OTHER THERAPEUTIC SUBSTANCE INTO SUBCUTANEOUS TISSUE, PERCUTANEOUS APPROACH: ICD-10-PCS | Performed by: INTERNAL MEDICINE

## 2018-12-06 PROCEDURE — 3E0F7GC INTRODUCTION OF OTHER THERAPEUTIC SUBSTANCE INTO RESPIRATORY TRACT, VIA NATURAL OR ARTIFICIAL OPENING: ICD-10-PCS | Performed by: INTERNAL MEDICINE

## 2018-12-06 RX ADMIN — GABAPENTIN SCH MG: 300 CAPSULE ORAL at 14:12

## 2018-12-06 RX ADMIN — CEPHALEXIN SCH MG: 500 CAPSULE ORAL at 11:24

## 2018-12-06 RX ADMIN — CEPHALEXIN SCH MG: 500 CAPSULE ORAL at 17:47

## 2018-12-06 RX ADMIN — LOSARTAN POTASSIUM AND HYDROCHLOROTHIAZIDE TABLETS SCH TAB: 50; 12.5 TABLET, FILM COATED ORAL at 10:13

## 2018-12-06 RX ADMIN — ANASTROZOLE SCH: 1 TABLET, COATED ORAL at 15:49

## 2018-12-06 RX ADMIN — BUDESONIDE AND FORMOTEROL FUMARATE DIHYDRATE SCH PUFF: 160; 4.5 AEROSOL RESPIRATORY (INHALATION) at 21:19

## 2018-12-06 RX ADMIN — GABAPENTIN SCH MG: 300 CAPSULE ORAL at 21:21

## 2018-12-06 NOTE — HP
Admitting History and Physical





- Admission


Chief Complaint: fall and inability to get up yesterday


History of Present Illness: 





89 yo female with PMH of Multiple Sclerosis, Diabetes, HTN and breast cancer 

fell yesterday in her apartment. She was trying to walk with her walker when 

her left lower extremity gave away. The patient lost her balance and fell on  

her buttocks. She did not lose consciousness and she denies palpitations, chest 

pain or lightheadedness. The patient did not injure her head. Prior to her fall 

the patient the patient developed a respiratory tract infection associated with 

cough and sporadic wheezing. She was not evaluated in my office for that.. 


History Source: Patient


Limitations to Obtaining History: No Limitations





- Past Medical History


CNS: Yes: Multiple Sclerosis


Cardiovascular: Yes: HTN, Hyperlipdemia.  No: AFIB, CAD, CHF


Gastrointestinal: Yes: Constipation


Renal/: Yes: Neurogenic Bladder


...Pregnant: No


Heme/Onc: Yes: Cancer (breast cancer of left breast)


Musculoskeletal: Yes: Osteoarthritis, Other (left lower extremity paresis)


Endocrine: Yes: Diabetes Mellitus





- Advance Directives


Advance Directives: Yes: Health Care Proxy





- Smoking History


Smoking history: Unknown if ever smoked


Have you smoked in the past 12 months: No


Aproximately how many cigarettes per day: 0


If you are a former smoker, when did you quit?: 2009





- Alcohol/Substance Use


Hx Alcohol Use: No





- Social History


History of Recent Travel: No





Home Medications





- Allergies


Allergies/Adverse Reactions: 


 Allergies











Allergy/AdvReac Type Severity Reaction Status Date / Time


 


Penicillins Allergy  Rash Verified 06/23/17 11:05


 


meperidine HCl [From Demerol] AdvReac   Verified 01/18/18 11:47














- Home Medications


Home Medications: 


Ambulatory Orders





Beta-Carotene(A) W-C and E/Min [Ocuvite (Nf) -] 1 tab PO DAILY 01/10/17 


Simvastatin 10 mg PO HS 01/10/17 


Acetaminophen [Tylenol .Regular Strength -] 650 mg PO Q6H PRN  tablet 01/19/18 


Anastrozole [Arimidex -] 1 mg PO DAILY  tablet 01/19/18 


Gabapentin [Neurontin -] 300 mg PO TID  capsule 01/19/18 


Glipizide Xl [Glucotrol Xl -] 5 mg PO DAILY@0700  tab.er.24 01/19/18 


Ibuprofen [Motrin -] 400 mg PO Q6H PRN  tablet 01/19/18 


Levothyroxine [Synthroid -] 100 mcg GT DAILY@0700  tablet 01/19/18 


Losartan 50Mg/Hctz 12.5MG [Hyzaar -] 1 tab PO DAILY  tablet 01/19/18 











Family Disease History





- Family Disease History


Family Disease History: Heart Disease: Father (fatal MI 63), Other: Son (2 sons 

with multiple sclerosis)





Review of Systems





- Review of Systems


Constitutional: reports: Other (in her usual state of health)


Eyes: reports: No Symptoms


HENT: reports: No Symptoms


Neck: reports: No Symptoms


Cardiovascular: reports: Edema (edema of lower extremity +2)


Respiratory: reports: Other (fine wheezing at bases)


Genitourinary: reports: Incontinence


Breasts: reports: No Symptoms Reported


Musculoskeletal: reports: Other (spasms in the left lower extremity especially 

at night, pain in the left foot)


Neurological: reports: Weakness (in the left lower extremity)


Psychiatric: reports: No Symptoms





Physical Examination


Vital Signs: 


 Vital Signs











Temperature  98.5 F   12/06/18 14:19


 


Pulse Rate  91 H  12/06/18 14:19


 


Respiratory Rate  18   12/06/18 14:19


 


Blood Pressure  131/49 L  12/06/18 14:19


 


O2 Sat by Pulse Oximetry (%)  95   12/06/18 14:19











Constitutional: Yes: No Distress, Obese


Eyes: Yes: WNL


HENT: Yes: Atraumatic, Normocephalic


Neck: Yes: Supple, Trachea Midline


Cardiovascular: Yes: Regular Rate and Rhythm, S1, S2


Respiratory: Yes: Regular, Wheezes (at the bases bilaterally)


Gastrointestinal: Yes: Abdomen, Obese, Other (could not evaluate internal 

organs due to obesity)


Breast(s): No: Breast Implants, Discharge from Nipple, Nipple Inversion, Skin 

Changes


Extremities: No: Calf Tenderness, Erythema


Edema: LLE: 1+, RLE: 1+


Peripheral Pulses WNL: Yes


Integumentary: Yes: WNL


Neurological: Yes: Alert, Oriented


Psychiatric: Yes: Alert, Oriented


Labs: 


 CBC, BMP





 12/05/18 22:25 





 12/05/18 22:12 











Imaging





- Results


Other: Other (left foot X ray : osteoporosis , metal screws  hips and pelvis X 

rays: no fracture, osteoarthritis Left knee X ray : no fracture)





Problem List





- Problems


(1) Contusion of leg, left


Assessment/Plan: 


not in pain when not ambulating


X rays of the left foot, kmee and  hip did not show any acute fracture


add Tylenol for pain


Code(s): S80.12XA - CONTUSION OF LEFT LOWER LEG, INITIAL ENCOUNTER   





(2) Status post fall


Assessment/Plan: 


due to MS


the patient's MS status has been declining slowly over the past few years


treat the possible urinary tract infectionm 


Code(s): Z91.89 - OTH PERSONAL RISK FACTORS, NOT ELSEWHERE CLASSIFIED   





(3) Multiple sclerosis not affecting current episode of care


Assessment/Plan: 


Neurontin 300 mg po tid


Code(s): QUN9657 -    





(4) UTI (urinary tract infection)


Assessment/Plan: 


Empiric Keflex po, received Ceftriaxone 1  gm iv


Code(s): N39.0 - URINARY TRACT INFECTION, SITE NOT SPECIFIED   


Qualifiers: 


   Urinary tract infection type: acute cystitis   Hematuria presence: without 

hematuria   Qualified Code(s): N30.00 - Acute cystitis without hematuria   





(5) Breast cancer, left


Assessment/Plan: 


continue Arimidex


Code(s): C50.912 - MALIGNANT NEOPLASM OF UNSPECIFIED SITE OF LEFT FEMALE BREAST

   





(6) Diabetes mellitus type 2 in obese


Assessment/Plan: 


Glipizide 5 mg 


Code(s): E11.69 - TYPE 2 DIABETES MELLITUS WITH OTHER SPECIFIED COMPLICATION; 

E66.9 - OBESITY, UNSPECIFIED   





(7) HTN (hypertension)


Assessment/Plan: 


Losartan /hctz po daily


Code(s): I10 - ESSENTIAL (PRIMARY) HYPERTENSION   





(8) Hypothyroid


Assessment/Plan: 


Synthroid


Code(s): E03.9 - HYPOTHYROIDISM, UNSPECIFIED   





Assessment/Plan





89 yo female with PMH of Multiple sclerosis will be discharged to 

rehabilitation for strengthening and clos observation

## 2018-12-07 LAB
ALBUMIN SERPL-MCNC: 3.3 G/DL (ref 3.5–5)
ALP SERPL-CCNC: 92 U/L (ref 32–92)
ALT SERPL-CCNC: 10 U/L (ref 10–40)
ANION GAP SERPL CALC-SCNC: 10 MMOL/L (ref 8–16)
AST SERPL-CCNC: 21 U/L (ref 10–42)
BASOPHILS # BLD: 0.7 % (ref 0–2)
BILIRUB SERPL-MCNC: 0.4 MG/DL (ref 0.2–1)
BUN SERPL-MCNC: 34 MG/DL (ref 7–18)
CALCIUM SERPL-MCNC: 8.8 MG/DL (ref 8.4–10.2)
CHLORIDE SERPL-SCNC: 103 MMOL/L (ref 98–107)
CO2 SERPL-SCNC: 27 MMOL/L (ref 22–28)
CREAT SERPL-MCNC: 1.4 MG/DL (ref 0.6–1.3)
DEPRECATED RDW RBC AUTO: 13.6 % (ref 11.6–15.6)
EOSINOPHIL # BLD: 2.6 % (ref 0–4.5)
GLUCOSE SERPL-MCNC: 185 MG/DL (ref 74–106)
HCT VFR BLD CALC: 35.8 % (ref 32.4–45.2)
HGB BLD-MCNC: 11.4 GM/DL (ref 10.7–15.3)
LYMPHOCYTES # BLD: 15.5 % (ref 8–40)
MCH RBC QN AUTO: 28.6 PG (ref 25.7–33.7)
MCHC RBC AUTO-ENTMCNC: 31.8 G/DL (ref 32–36)
MCV RBC: 90 FL (ref 80–96)
MONOCYTES # BLD AUTO: 6.1 % (ref 3.8–10.2)
NEUTROPHILS # BLD: 75.1 % (ref 42.8–82.8)
PLATELET # BLD AUTO: 283 K/MM3 (ref 134–434)
PMV BLD: 10.4 FL (ref 7.5–11.1)
POTASSIUM SERPLBLD-SCNC: 4.3 MMOL/L (ref 3.5–5.1)
PROT SERPL-MCNC: 6.6 G/DL (ref 6.4–8.3)
RBC # BLD AUTO: 3.98 M/MM3 (ref 3.6–5.2)
SODIUM SERPL-SCNC: 140 MMOL/L (ref 136–145)
WBC # BLD AUTO: 9.5 K/MM3 (ref 4–10.8)

## 2018-12-07 RX ADMIN — GABAPENTIN SCH MG: 300 CAPSULE ORAL at 05:55

## 2018-12-07 RX ADMIN — CEPHALEXIN SCH MG: 500 CAPSULE ORAL at 17:47

## 2018-12-07 RX ADMIN — LIDOCAINE SCH PATCH: 50 PATCH TOPICAL at 10:03

## 2018-12-07 RX ADMIN — GLIPIZIDE SCH MG: 5 TABLET ORAL at 06:27

## 2018-12-07 RX ADMIN — ATORVASTATIN CALCIUM SCH MG: 10 TABLET, FILM COATED ORAL at 21:58

## 2018-12-07 RX ADMIN — CEPHALEXIN SCH MG: 500 CAPSULE ORAL at 00:00

## 2018-12-07 RX ADMIN — ENOXAPARIN SODIUM SCH MG: 30 INJECTION SUBCUTANEOUS at 10:02

## 2018-12-07 RX ADMIN — NYSTATIN SCH APPLIC: 100000 POWDER TOPICAL at 21:58

## 2018-12-07 RX ADMIN — Medication SCH EACH: at 21:58

## 2018-12-07 RX ADMIN — ANASTROZOLE SCH MG: 1 TABLET, COATED ORAL at 10:02

## 2018-12-07 RX ADMIN — CEPHALEXIN SCH MG: 500 CAPSULE ORAL at 12:11

## 2018-12-07 RX ADMIN — BUDESONIDE AND FORMOTEROL FUMARATE DIHYDRATE SCH: 160; 4.5 AEROSOL RESPIRATORY (INHALATION) at 21:59

## 2018-12-07 RX ADMIN — GABAPENTIN SCH MG: 300 CAPSULE ORAL at 14:49

## 2018-12-07 RX ADMIN — LOSARTAN POTASSIUM AND HYDROCHLOROTHIAZIDE TABLETS SCH TAB: 50; 12.5 TABLET, FILM COATED ORAL at 10:02

## 2018-12-07 RX ADMIN — CEPHALEXIN SCH MG: 500 CAPSULE ORAL at 05:55

## 2018-12-07 RX ADMIN — BUDESONIDE AND FORMOTEROL FUMARATE DIHYDRATE SCH: 160; 4.5 AEROSOL RESPIRATORY (INHALATION) at 10:09

## 2018-12-07 RX ADMIN — LEVOTHYROXINE SODIUM SCH MCG: 150 TABLET ORAL at 06:27

## 2018-12-07 RX ADMIN — GABAPENTIN SCH MG: 300 CAPSULE ORAL at 21:58

## 2018-12-07 RX ADMIN — NYSTATIN SCH APPLIC: 100000 POWDER TOPICAL at 17:47

## 2018-12-07 RX ADMIN — ASPIRIN 81 MG SCH MG: 81 TABLET ORAL at 10:02

## 2018-12-07 NOTE — PN
Progress Note, Physician


Chief Complaint: 





left foot pain yesterday while walking


History of Present Illness: 





87 yo female with PMH of MS, admitted post fall due to weakness of the left 

lower extremity, with possible trauma to the foot. During attempts of 

ambulation yesterday the patient started to complain of pain in the left foot. 

AN x ray was performed which shows left ankle orthotic hardware and old 3rd 

left metatarsal distal fracture. 


Today the patient is tender to deep palpation over the entire distal left foot 

when standing up.


HEr cough is better and she had a bowel movement. 





- Current Medication List


Current Medications: 


Active Medications





Acetaminophen (Tylenol -)  650 mg PO Q4H PRN


   PRN Reason: PAIN


Anastrozole (Arimidex -)  1 mg PO DAILY [facility]


   Last Admin: 12/06/18 15:49 Dose:  Not Given


Aspirin (Asa -)  81 mg PO DAILY [facility]


Budesonide/Formoterol Fumarate (Symbicort 160/4.5mcg -)  2 puff IH BID [facility]


   Last Admin: 12/06/18 21:19 Dose:  2 puff


Cephalexin HCl (Keflex -)  500 mg PO Q6HPO [facility]


   Last Admin: 12/07/18 05:55 Dose:  500 mg


Enoxaparin Sodium (Lovenox -)  30 mg SQ DAILY [facility]


Gabapentin (Neurontin -)  300 mg PO TID [facility]


   Last Admin: 12/07/18 05:55 Dose:  300 mg


Glipizide (Glucotrol -)  5 mg PO DAILY@0700 [facility]


   Last Admin: 12/07/18 06:27 Dose:  5 mg


HCTZ/Losartan Potassium (Hyzaar -)  2 tab PO DAILY [facility]


   Last Admin: 12/06/18 10:13 Dose:  2 tab


Levothyroxine Sodium (Synthroid -)  150 mcg PO DAILY@0700 [facility]


   Last Admin: 12/07/18 06:27 Dose:  150 mcg











- Objective


Vital Signs: 


 Vital Signs











Temperature  98.3 F   12/07/18 06:00


 


Pulse Rate  84   12/07/18 06:00


 


Respiratory Rate  18   12/07/18 06:00


 


Blood Pressure  137/38 L  12/07/18 06:00


 


O2 Sat by Pulse Oximetry (%)  94 L  12/07/18 08:55











Constitutional: Yes: No Distress, Calm


Eyes: Yes: Conjunctiva Clear, EOM Intact


HENT: Yes: Atraumatic, Normocephalic


Neck: Yes: Supple, Trachea Midline


Cardiovascular: Yes: Regular Rate and Rhythm, S1, S2


Respiratory: Yes: Regular, CTA Bilaterally.  No: Wheezes


Gastrointestinal: Yes: Normal Bowel Sounds, Soft, Abdomen, Obese


...Rectal Exam: Yes: Deferred


Musculoskeletal: Yes: Other (pain to palpation of left left distal foot, and 

when standing up and flexing the foot)


Extremities: Yes: Other (distal metatarsal area of the left wilbert pain to deep 

palpation).  No: Calf Tenderness


Edema: LLE: 1+, RLE: 1+


Integumentary: Yes: Other (inguinal area erythematous rash)


Neurological: Yes: Unsteady Gait (ambulated with a walker, can stand without 

help, can ambulate with assistance for very short distances), Weakness (in tejh 

left loweer extremity, due to MS, at baseline), Other (power 4/5 in left lower 

etxremity)


Psychiatric: Yes: Alert, Oriented


Labs: 


 CBC, BMP





 12/05/18 22:25 





 12/05/18 22:12 











- ....Imaging


Other: Other (left foot X ray - old 3rd metatarsal fracture?)





Problem List





- Problems


(1) Contusion of leg, left


Assessment/Plan: 


 in pain when ambulating


X rays of the left foot old 3rd metatrsal fracture


PODIaTRY BOOT provided


Tylenol por pain


Ortho consult for possible additional measures


, knee and  hip did not show any acute fracture


add Tylenol for pain


Code(s): S80.12XA - CONTUSION OF LEFT LOWER LEG, INITIAL ENCOUNTER   





(2) Status post fall


Assessment/Plan: 


due to MS


the patient's MS status has been declining slowly over the past few years


treat the possible urinary tract infection 


Code(s): Z91.89 - OTH PERSONAL RISK FACTORS, NOT ELSEWHERE CLASSIFIED   





(3) Multiple sclerosis not affecting current episode of care


Assessment/Plan: 


Neurontin 300 mg po tid


Code(s): TOQ4297 -    





(4) UTI (urinary tract infection)


Assessment/Plan: 


Empiric Keflex po, received Ceftriaxone 1  gm iv and now on KEflex 


Code(s): N39.0 - URINARY TRACT INFECTION, SITE NOT SPECIFIED   


Qualifiers: 


   Urinary tract infection type: acute cystitis   Hematuria presence: without 

hematuria   Qualified Code(s): N30.00 - Acute cystitis without hematuria   





(5) Breast cancer, left


Assessment/Plan: 


continue Arimidex, DFVT prophylaxis, Lovenox 30 mg sc daily


Code(s): C50.912 - MALIGNANT NEOPLASM OF UNSPECIFIED SITE OF LEFT FEMALE BREAST

   





(6) Diabetes mellitus type 2 in obese


Assessment/Plan: 


Glipizide XL 5 mg daily


Code(s): E11.69 - TYPE 2 DIABETES MELLITUS WITH OTHER SPECIFIED COMPLICATION; 

E66.9 - OBESITY, UNSPECIFIED   





(7) HTN (hypertension)


Assessment/Plan: 


Losartan /hctz po daily


Code(s): I10 - ESSENTIAL (PRIMARY) HYPERTENSION   





(8) Hypothyroid


Assessment/Plan: 


Synthroid


Code(s): E03.9 - HYPOTHYROIDISM, UNSPECIFIED   





(9) Acute bronchitis


Assessment/Plan: 


Keflex and Symbicort


better today and no wheezing


lidoderm path to the foot 12 hour on 12 hours off


Code(s): J20.9 - ACUTE BRONCHITIS, UNSPECIFIED   





Assessment/Plan





87 yo female with PMH of Multiple sclerosis will be discharged to 

rehabilitation for strengthening and close observation. There is discomfort in 

the left foot whne walking and patient can not tolerate rashad po dfaitry shoe. 

Added Lidoderm patch daily

## 2018-12-07 NOTE — CONS
DATE OF CONSULTATION:  12/06/2018

 

CHIEF COMPLAINT:  Foot pain.

 

HISTORY OF PRESENT ILLNESS:  This is an 88-year-old female who had suffered a 
trip

and fall in her apartment.  She typically walks with a walker.  She was 
complaining

of foot pain.  She was found to have a fracture in her foot.  Orthopedic 
consultation

was called.  She denies any pain at other areas at this time.  She denies any

radiating pain, numbness, or tingling.

 

PAST MEDICAL HISTORY:  Significant for hypertension, hyperlipidemia, multiple

sclerosis, breast cancer.

 

SURGICAL HISTORY:  Noncontributory.

 

SOCIAL HISTORY:  Denies alcohol, tobacco, or drugs currently; is a former 
smoker.

 

REVIEW OF SYSTEMS:  Negative for fever, chills, nausea, vomiting, or night 
sweats.

 

MEDICATIONS:  Reviewed as in chart.

 

ALLERGIES:  Included PENICILLINS and DEMEROL.

 

FAMILY HISTORY:  Noncontributory.

 

PHYSICAL EXAMINATION:

General:  This is a well-appearing female in no acute distress.  She is alert 
and

oriented x3.

Vital Signs:  She is afebrile, and vital signs are stable. Respirations are 
regular.

Abdomen:  Soft and nontender.

Left Lower Extremity:  Demonstrates no skin lesions.  There is mild swelling.  
Tender

to palpation over 3rd metatarsal.  There is full range of motion about the 
ankle and

foot.  Sensation is grossly intact to light touch.   pulse 2+; 5/5 distal 
motor.

 

Radiographs reviewed, demonstrating a minimally displaced fracture of the 3rd 
distal

metatarsal shaft.

 

ASSESSMENT:  Left 3rd distal metatarsal shaft fracture.

 

PLAN:  I reviewed today's findings with the patient as well as her daughter.  I

advised her she has a 3rd metatarsal shaft fracture.  She is already in a hard-
sole

shoe.  She can weight bear as tolerated in the hard-sole shoe.  I anticipate 
about

6-8 weeks' healing time.  She will follow up in 2-3 weeks for a followup 
radiograph. 

I addressed all of her questions and concerns.

 

 

HERSON GARDUNO M.D.

 

[id number]

DD: 12/07/2018 20:56

DT: 12/07/2018 21:43

Job #:  [id number]

MTDD

## 2018-12-07 NOTE — DS
Physical Examination


Vital Signs: 


 Vital Signs











Temperature  98.3 F   12/07/18 06:00


 


Pulse Rate  84   12/07/18 06:00


 


Respiratory Rate  18   12/07/18 06:00


 


Blood Pressure  137/38 L  12/07/18 06:00


 


O2 Sat by Pulse Oximetry (%)  94 L  12/07/18 08:55











Constitutional: Yes: No Distress, Calm


Eyes: Yes: Conjunctiva Clear, EOM Intact


HENT: Yes: Atraumatic, Normocephalic


Neck: Yes: Supple, Trachea Midline


Cardiovascular: Yes: Regular Rate and Rhythm, S1, S2


Respiratory: Yes: Regular, CTA Bilaterally


Gastrointestinal: Yes: Normal Bowel Sounds, Soft, Abdomen, Obese


Musculoskeletal: Yes: Other (pain oivere the left foot when walking)


Extremities: No: Calf Tenderness


Peripheral Pulses: Left Radial: 0, Right Radial: 0, Left Doralis Pedis: 0, 

Right Dorsalis Pedis: 0, Left Femoral: 0, Right Femoral: 0


Neurological: Yes: Alert, Oriented


Psychiatric: Yes: Alert, Oriented





Discharge Summary


Reason For Visit: UTI/WEAKNESS


Current Active Problems





Acute bronchitis (Acute)


Contusion of leg, left (Acute)


UTI (urinary tract infection) (Acute)


Weakness (Acute)








Hospital Course: 





uneventful


sprain of the left foot , old  metatarsal fracture


 pain management and podiatry boot


discharge to re habilitation


Condition: Stable





- Instructions





- Home Medications


Comprehensive Discharge Medication List: 


Ambulatory Orders





Beta-Carotene(A) W-C and E/Min [Ocuvite (Nf) -] 1 tab PO DAILY 01/10/17 


Simvastatin 10 mg PO HS 01/10/17 


Acetaminophen [Tylenol .Regular Strength -] 650 mg PO Q6H PRN  tablet 01/19/18 


Anastrozole [Arimidex -] 1 mg PO DAILY  tablet 01/19/18 


Gabapentin [Neurontin -] 300 mg PO TID  capsule 01/19/18 


Glipizide Xl [Glucotrol Xl -] 5 mg PO DAILY@0700  tab.er.24 01/19/18 


Levothyroxine [Synthroid -] 100 mcg GT DAILY@0700  tablet 01/19/18 


Losartan 50Mg/Hctz 12.5MG [Hyzaar -] 1 tab PO DAILY  tablet 01/19/18 


Anastrozole [Arimidex -] 1 mg PO DAILY  tablet 12/06/18 


Budesonide/Formeterol Fumarate [SYMBICORT 160/4.5mcg -] 2 puff IH BID  inhaler 

12/06/18 


Cephalexin Monohydrate [Keflex -] 500 mg PO Q6HPO 7 Days #30 capsule 12/06/18 


Gabapentin [Neurontin -] 300 mg PO TID  capsule 12/06/18 


Levothyroxine [Synthroid -] 150 mcg PO DAILY@0700  tablet 12/06/18 


Losartan 50Mg/Hctz 12.5MG [Hyzaar -] 2 tab PO DAILY  tablet 12/06/18 


Acetaminophen [Tylenol .Regular Strength -] 650 mg PO Q4H PRN  tablet 12/07/18 


Aspirin [ASA -] 81 mg PO DAILY  tab.chew 12/07/18 


Enoxaparin [Lovenox -] 30 mg SQ DAILY  disp.syrin 12/07/18 


Lidocaine 5% Patch [Lidoderm -] 1 patch TP DAILY  patch 12/07/18 


Lidocaine Patch Removal [Lidoderm Patch Removal] 1 each MC DAILY@2200  each 12/ 07/18

## 2018-12-08 RX ADMIN — NYSTATIN SCH APPLIC: 100000 POWDER TOPICAL at 06:19

## 2018-12-08 RX ADMIN — LIDOCAINE SCH PATCH: 50 PATCH TOPICAL at 10:32

## 2018-12-08 RX ADMIN — NYSTATIN SCH: 100000 POWDER TOPICAL at 13:20

## 2018-12-08 RX ADMIN — ASPIRIN 81 MG SCH MG: 81 TABLET ORAL at 10:32

## 2018-12-08 RX ADMIN — GABAPENTIN SCH MG: 300 CAPSULE ORAL at 13:14

## 2018-12-08 RX ADMIN — ANASTROZOLE SCH MG: 1 TABLET, COATED ORAL at 10:33

## 2018-12-08 RX ADMIN — BUDESONIDE AND FORMOTEROL FUMARATE DIHYDRATE SCH PUFF: 160; 4.5 AEROSOL RESPIRATORY (INHALATION) at 21:26

## 2018-12-08 RX ADMIN — NYSTATIN SCH APPLIC: 100000 POWDER TOPICAL at 21:34

## 2018-12-08 RX ADMIN — CEPHALEXIN SCH MG: 500 CAPSULE ORAL at 06:19

## 2018-12-08 RX ADMIN — NYSTATIN SCH APPLIC: 100000 POWDER TOPICAL at 17:35

## 2018-12-08 RX ADMIN — ENOXAPARIN SODIUM SCH MG: 30 INJECTION SUBCUTANEOUS at 10:32

## 2018-12-08 RX ADMIN — ACETAMINOPHEN PRN MG: 325 TABLET ORAL at 00:33

## 2018-12-08 RX ADMIN — CEPHALEXIN SCH MG: 500 CAPSULE ORAL at 00:14

## 2018-12-08 RX ADMIN — BUDESONIDE AND FORMOTEROL FUMARATE DIHYDRATE SCH PUFF: 160; 4.5 AEROSOL RESPIRATORY (INHALATION) at 10:33

## 2018-12-08 RX ADMIN — LEVOTHYROXINE SODIUM SCH MCG: 150 TABLET ORAL at 06:19

## 2018-12-08 RX ADMIN — GLIPIZIDE SCH MG: 5 TABLET ORAL at 06:19

## 2018-12-08 RX ADMIN — CEPHALEXIN SCH MG: 500 CAPSULE ORAL at 23:48

## 2018-12-08 RX ADMIN — ATORVASTATIN CALCIUM SCH MG: 10 TABLET, FILM COATED ORAL at 21:26

## 2018-12-08 RX ADMIN — CEPHALEXIN SCH MG: 500 CAPSULE ORAL at 11:41

## 2018-12-08 RX ADMIN — Medication SCH EACH: at 21:34

## 2018-12-08 RX ADMIN — GABAPENTIN SCH MG: 300 CAPSULE ORAL at 06:19

## 2018-12-08 RX ADMIN — GABAPENTIN SCH MG: 300 CAPSULE ORAL at 21:26

## 2018-12-08 RX ADMIN — CEPHALEXIN SCH MG: 500 CAPSULE ORAL at 17:13

## 2018-12-08 RX ADMIN — LOSARTAN POTASSIUM AND HYDROCHLOROTHIAZIDE TABLETS SCH TAB: 50; 12.5 TABLET, FILM COATED ORAL at 10:32

## 2018-12-08 NOTE — PN
Progress Note, Physician


Chief Complaint: 





left foot pain yesterday while walking


History of Present Illness: 





87 yo female with PMH of MS, admitted post fall due to weakness of the left 

lower extremity, with possible trauma to the foot and inability to walk. Sg carmen 

is waiting to be transferred to [facility] since she lives alone and is 

not safe to be discharged hoem due to her mobility issues


The pain to deep palpation over the entire distal left foot is persisting.


HEr cough is better.





- Current Medication List


Current Medications: 


Active Medications





Acetaminophen (Tylenol -)  650 mg PO Q4H PRN


   PRN Reason: PAIN


   Last Admin: 12/08/18 00:33 Dose:  650 mg


Anastrozole (Arimidex -)  1 mg PO DAILY [facility]


   Last Admin: 12/08/18 10:33 Dose:  1 mg


Aspirin (Asa -)  81 mg PO DAILY [facility]


   Last Admin: 12/08/18 10:32 Dose:  81 mg


Atorvastatin Calcium (Lipitor -)  10 mg PO HS [facility]


   Last Admin: 12/07/18 21:58 Dose:  10 mg


Budesonide/Formoterol Fumarate (Symbicort 160/4.5mcg -)  2 puff IH BID [facility]


   Last Admin: 12/08/18 10:33 Dose:  2 puff


Cephalexin HCl (Keflex -)  500 mg PO Q6HPO [facility]


   Last Admin: 12/08/18 17:13 Dose:  500 mg


Enoxaparin Sodium (Lovenox -)  30 mg SQ DAILY [facility]


   Last Admin: 12/08/18 10:32 Dose:  30 mg


Gabapentin (Neurontin -)  300 mg PO TID [facility]


   Last Admin: 12/08/18 13:14 Dose:  300 mg


Glipizide (Glucotrol -)  5 mg PO DAILY@0700 [facility]


   Last Admin: 12/08/18 06:19 Dose:  5 mg


HCTZ/Losartan Potassium (Hyzaar -)  2 tab PO DAILY [facility]


   Last Admin: 12/08/18 10:32 Dose:  2 tab


Levothyroxine Sodium (Synthroid -)  150 mcg PO DAILY@0700 [facility]


   Last Admin: 12/08/18 06:19 Dose:  150 mcg


Lidocaine (Lidoderm Patch -)  1 patch TP DAILY [facility]


   Last Admin: 12/08/18 10:32 Dose:  1 patch


Miscellaneous (Lidoderm Patch Removal)  1 each MC DAILY@2200 [facility]


   Last Admin: 12/07/18 21:58 Dose:  1 each


Nystatin (Nystop Powder -)  1 applic TP TID [facility]


   Last Admin: 12/08/18 17:35 Dose:  1 applic











- Objective


Vital Signs: 


 Vital Signs











Temperature  98.0 F   12/08/18 14:00


 


Pulse Rate  89   12/08/18 14:00


 


Respiratory Rate  18   12/08/18 14:00


 


Blood Pressure  118/49 L  12/08/18 14:00


 


O2 Sat by Pulse Oximetry (%)  96   12/08/18 14:00











Constitutional: Yes: No Distress, Calm


Eyes: Yes: Conjunctiva Clear, EOM Intact


HENT: Yes: Atraumatic, Normocephalic


Neck: Yes: Supple, Trachea Midline


Cardiovascular: Yes: Regular Rate and Rhythm, S1, S2


Respiratory: Yes: Regular, CTA Bilaterally


Gastrointestinal: Yes: Normal Bowel Sounds, Soft, Abdomen, Obese


Musculoskeletal: Yes: Other (pain in the left foot)


Edema: No


Labs: 


 CBC, BMP





 12/07/18 09:29 





 12/07/18 09:29 











Problem List





- Problems


(1) Contusion of leg, left


Assessment/Plan: 


 in pain when ambulating


X rays of the left foot old 3rd metatrsal fracture


PODIaTRY BOOT provided


awaiting  for discharge to rehabilitation


Code(s): S80.12XA - CONTUSION OF LEFT LOWER LEG, INITIAL ENCOUNTER   





(2) Status post fall


Assessment/Plan: 


due to MS


the patient's MS status has been declining slowly over the past few years


treat the possible urinary tract infection 


Code(s): Z91.89 - OTH PERSONAL RISK FACTORS, NOT ELSEWHERE CLASSIFIED   





(3) Multiple sclerosis not affecting current episode of care


Assessment/Plan: 


Neurontin 300 mg po tid


Code(s): BPL0145 -    





(4) UTI (urinary tract infection)


Assessment/Plan: 


Empiric Keflex po, received Ceftriaxone 1  gm iv and now on KEflex 


Code(s): N39.0 - URINARY TRACT INFECTION, SITE NOT SPECIFIED   


Qualifiers: 


   Urinary tract infection type: acute cystitis   Hematuria presence: without 

hematuria   Qualified Code(s): N30.00 - Acute cystitis without hematuria   





(5) Breast cancer, left


Assessment/Plan: 


continue Arimidex, DFVT prophylaxis, Lovenox 30 mg sc daily


Code(s): C50.912 - MALIGNANT NEOPLASM OF UNSPECIFIED SITE OF LEFT FEMALE BREAST

   





(6) Diabetes mellitus type 2 in obese


Assessment/Plan: 


Glipizide XL 5 mg daily


accpanchos AC and HS


Code(s): E11.69 - TYPE 2 DIABETES MELLITUS WITH OTHER SPECIFIED COMPLICATION; 

E66.9 - OBESITY, UNSPECIFIED   





(7) HTN (hypertension)


Assessment/Plan: 


Losartan /hctz po daily


Code(s): I10 - ESSENTIAL (PRIMARY) HYPERTENSION   





(8) Hypothyroid


Assessment/Plan: 


Synthroid


Code(s): E03.9 - HYPOTHYROIDISM, UNSPECIFIED   





(9) Acute bronchitis


Assessment/Plan: 


Keflex and Symbicort


better today and no wheezing


lidoderm path to the foot 12 hour on 12 hours off


Code(s): J20.9 - ACUTE BRONCHITIS, UNSPECIFIED

## 2018-12-09 LAB
ALBUMIN SERPL-MCNC: 3.4 G/DL (ref 3.5–5)
ALP SERPL-CCNC: 83 U/L (ref 32–92)
ALT SERPL-CCNC: 13 U/L (ref 10–40)
ANION GAP SERPL CALC-SCNC: 12 MMOL/L (ref 8–16)
AST SERPL-CCNC: 22 U/L (ref 10–42)
BILIRUB SERPL-MCNC: 0.6 MG/DL (ref 0.2–1)
BUN SERPL-MCNC: 35 MG/DL (ref 7–18)
CALCIUM SERPL-MCNC: 8.6 MG/DL (ref 8.4–10.2)
CHLORIDE SERPL-SCNC: 103 MMOL/L (ref 98–107)
CO2 SERPL-SCNC: 27 MMOL/L (ref 22–28)
CREAT SERPL-MCNC: 1.4 MG/DL (ref 0.6–1.3)
GLUCOSE SERPL-MCNC: 92 MG/DL (ref 74–106)
POTASSIUM SERPLBLD-SCNC: 4.1 MMOL/L (ref 3.5–5.1)
PROT SERPL-MCNC: 6.7 G/DL (ref 6.4–8.3)
SODIUM SERPL-SCNC: 142 MMOL/L (ref 136–145)

## 2018-12-09 RX ADMIN — NYSTATIN SCH APPLIC: 100000 POWDER TOPICAL at 21:00

## 2018-12-09 RX ADMIN — LEVOTHYROXINE SODIUM SCH MCG: 150 TABLET ORAL at 06:16

## 2018-12-09 RX ADMIN — CEPHALEXIN SCH MG: 500 CAPSULE ORAL at 12:28

## 2018-12-09 RX ADMIN — MAGNESIUM OXIDE TAB 400 MG (241.3 MG ELEMENTAL MG) SCH MG: 400 (241.3 MG) TAB at 17:51

## 2018-12-09 RX ADMIN — GABAPENTIN SCH MG: 300 CAPSULE ORAL at 14:13

## 2018-12-09 RX ADMIN — GABAPENTIN SCH MG: 300 CAPSULE ORAL at 21:00

## 2018-12-09 RX ADMIN — Medication SCH EACH: at 21:00

## 2018-12-09 RX ADMIN — ATORVASTATIN CALCIUM SCH MG: 10 TABLET, FILM COATED ORAL at 21:00

## 2018-12-09 RX ADMIN — NYSTATIN SCH APPLIC: 100000 POWDER TOPICAL at 14:14

## 2018-12-09 RX ADMIN — BUDESONIDE AND FORMOTEROL FUMARATE DIHYDRATE SCH PUFF: 160; 4.5 AEROSOL RESPIRATORY (INHALATION) at 21:00

## 2018-12-09 RX ADMIN — ANASTROZOLE SCH MG: 1 TABLET, COATED ORAL at 09:43

## 2018-12-09 RX ADMIN — ENOXAPARIN SODIUM SCH MG: 30 INJECTION SUBCUTANEOUS at 09:44

## 2018-12-09 RX ADMIN — BUDESONIDE AND FORMOTEROL FUMARATE DIHYDRATE SCH PUFF: 160; 4.5 AEROSOL RESPIRATORY (INHALATION) at 09:45

## 2018-12-09 RX ADMIN — LIDOCAINE SCH PATCH: 50 PATCH TOPICAL at 09:44

## 2018-12-09 RX ADMIN — GABAPENTIN SCH MG: 300 CAPSULE ORAL at 06:16

## 2018-12-09 RX ADMIN — CEPHALEXIN SCH MG: 500 CAPSULE ORAL at 17:51

## 2018-12-09 RX ADMIN — NYSTATIN SCH APPLIC: 100000 POWDER TOPICAL at 06:17

## 2018-12-09 RX ADMIN — CEPHALEXIN SCH MG: 500 CAPSULE ORAL at 06:16

## 2018-12-09 RX ADMIN — ASPIRIN 81 MG SCH MG: 81 TABLET ORAL at 09:43

## 2018-12-09 RX ADMIN — GLIPIZIDE SCH MG: 5 TABLET ORAL at 06:16

## 2018-12-09 RX ADMIN — LOSARTAN POTASSIUM AND HYDROCHLOROTHIAZIDE TABLETS SCH TAB: 50; 12.5 TABLET, FILM COATED ORAL at 09:43

## 2018-12-09 NOTE — PN
Progress Note, Physician


Chief Complaint: 





left foot pain yesterday while walking is improving with the application of 

Lidoderm patch and the use of the podiatry boot


cramps in the lower extremities


History of Present Illness: 





87 yo female with PMH of MS, admitted post fall due to weakness of the left 

lower extremity, with possible trauma to the foot. AN x ray was performed which 

showed old 3rd left metatarsal distal fracture. 


Today the patient is tender to deep palpation over the entire distal left foot 

when standing up.


HEr cough is better and she had a bowel movement. The blood sugar levels were 

lower in the morning.





- Current Medication List


Current Medications: 


Active Medications





Acetaminophen (Tylenol -)  650 mg PO Q4H PRN


   PRN Reason: PAIN


   Last Admin: 12/08/18 00:33 Dose:  650 mg


Anastrozole (Arimidex -)  1 mg PO DAILY [facility]


   Last Admin: 12/09/18 09:43 Dose:  1 mg


Aspirin (Asa -)  81 mg PO DAILY [facility]


   Last Admin: 12/09/18 09:43 Dose:  81 mg


Atorvastatin Calcium (Lipitor -)  10 mg PO HS [facility]


   Last Admin: 12/08/18 21:26 Dose:  10 mg


Budesonide/Formoterol Fumarate (Symbicort 160/4.5mcg -)  2 puff IH BID [facility]


   Last Admin: 12/09/18 09:45 Dose:  2 puff


Cephalexin HCl (Keflex -)  500 mg PO Q6HPO [facility]


   Last Admin: 12/09/18 12:28 Dose:  500 mg


Enoxaparin Sodium (Lovenox -)  30 mg SQ DAILY [facility]


   Last Admin: 12/09/18 09:44 Dose:  30 mg


Gabapentin (Neurontin -)  300 mg PO TID [facility]


   Last Admin: 12/09/18 14:13 Dose:  300 mg


Glipizide (Glucotrol -)  2.5 mg PO DAILY@0700 [facility]


HCTZ/Losartan Potassium (Hyzaar -)  2 tab PO DAILY [facility]


   Last Admin: 12/09/18 09:43 Dose:  2 tab


Levothyroxine Sodium (Synthroid -)  150 mcg PO DAILY@0700 [facility]


   Last Admin: 12/09/18 06:16 Dose:  150 mcg


Lidocaine (Lidoderm Patch -)  1 patch TP DAILY [facility]


   Last Admin: 12/09/18 09:44 Dose:  1 patch


Magnesium Oxide (Mag-Ox -)  400 mg PO DAILY [facility]


Miscellaneous (Lidoderm Patch Removal)  1 each MC DAILY@2200 [facility]


   Last Admin: 12/08/18 21:34 Dose:  1 each


Nystatin (Nystop Powder -)  1 applic TP TID [facility]


   Last Admin: 12/09/18 14:14 Dose:  1 applic











- Objective


Vital Signs: 


 Vital Signs











Temperature  97.9 F   12/09/18 14:35


 


Pulse Rate  84   12/09/18 15:39


 


Respiratory Rate  18   12/09/18 14:35


 


Blood Pressure  111/41 L  12/09/18 15:39


 


O2 Sat by Pulse Oximetry (%)  96   12/09/18 14:35











Constitutional: Yes: No Distress, Calm


Eyes: Yes: Conjunctiva Clear, EOM Intact


HENT: Yes: Atraumatic, Normocephalic


Neck: Yes: Supple, Trachea Midline


Cardiovascular: Yes: Regular Rate and Rhythm, S1, S2


Respiratory: Yes: Regular, CTA Bilaterally


Gastrointestinal: Yes: Normal Bowel Sounds, Soft, Abdomen, Obese.  No: 

Hepatomegaly, Splenomegaly


Extremities: No: Calf Tenderness


Edema: No


Peripheral Pulses WNL: Yes


Neurological: Yes: Alert, Oriented


Psychiatric: Yes: Alert, Oriented


Labs: 


 CBC, BMP





 12/07/18 09:29 





 12/09/18 08:00 











Problem List





- Problems


(1) Contusion of leg, left


Assessment/Plan: 


 in pain when ambulating


X rays of the left foot old 3rd metatarsal fracture


PODIaTRY BOOT provided and applications of Lidocaine  topically


Tylenol for pain





Code(s): S80.12XA - CONTUSION OF LEFT LOWER LEG, INITIAL ENCOUNTER   





(2) Status post fall


Assessment/Plan: 


due to MS


the patient's MS status has been declining slowly over the past few years


treat the possible urinary tract infection 


Code(s): Z91.89 - [facility] PERSONAL RISK FACTORS, NOT ELSEWHERE CLASSIFIED   





(3) Multiple sclerosis not affecting current episode of care


Assessment/Plan: 


Neurontin 300 mg po tid


Code(s): RUZ0041 -    





(4) UTI (urinary tract infection)


Assessment/Plan: 


not comfirmed by the urine culture


Code(s): N39.0 - URINARY TRACT INFECTION, SITE NOT SPECIFIED   


Qualifiers: 


   Urinary tract infection type: acute cystitis   Hematuria presence: without 

hematuria   Qualified Code(s): N30.00 - Acute cystitis without hematuria   





(5) Breast cancer, left


Assessment/Plan: 


continue Arimidex, DFVT prophylaxis, Lovenox 30 mg sc daily


DT prophylaxis with SCD in addition to Lovenox


Code(s): C50.912 - MALIGNANT NEOPLASM OF UNSPECIFIED SITE OF LEFT FEMALE BREAST

   





(6) Diabetes mellitus type 2 in obese


Assessment/Plan: 


Glipizide XL 5 mg daily was decreased to 2.5 mg daily for improved blodo sugar 

control 


Code(s): E11.69 - TYPE 2 DIABETES MELLITUS WITH OTHER SPECIFIED COMPLICATION; 

E66.9 - OBESITY, UNSPECIFIED   





(7) HTN (hypertension)


Assessment/Plan: 


Losartan /hctz po daily


check magnesium llevels


start Magnesium 400 mg daily


Code(s): I10 - ESSENTIAL (PRIMARY) HYPERTENSION   





(8) Hypothyroid


Assessment/Plan: 


Synthroid


Code(s): E03.9 - HYPOTHYROIDISM, UNSPECIFIED   





(9) Acute bronchitis


Assessment/Plan: 


Keflex and Symbicort


better today and no wheezing





Code(s): J20.9 - ACUTE BRONCHITIS, UNSPECIFIED

## 2018-12-10 VITALS — HEART RATE: 78 BPM | TEMPERATURE: 97.6 F | DIASTOLIC BLOOD PRESSURE: 40 MMHG | SYSTOLIC BLOOD PRESSURE: 96 MMHG

## 2018-12-10 LAB
ALBUMIN SERPL-MCNC: 2.9 G/DL (ref 3.5–5)
ALP SERPL-CCNC: 73 U/L (ref 32–92)
ALT SERPL-CCNC: 11 U/L (ref 10–40)
ANION GAP SERPL CALC-SCNC: 8 MMOL/L (ref 8–16)
AST SERPL-CCNC: 17 U/L (ref 10–42)
BILIRUB SERPL-MCNC: 0.5 MG/DL (ref 0.2–1)
BUN SERPL-MCNC: 34 MG/DL (ref 7–18)
CALCIUM SERPL-MCNC: 8.5 MG/DL (ref 8.4–10.2)
CHLORIDE SERPL-SCNC: 104 MMOL/L (ref 98–107)
CO2 SERPL-SCNC: 28 MMOL/L (ref 22–28)
CREAT SERPL-MCNC: 1.4 MG/DL (ref 0.6–1.3)
GLUCOSE SERPL-MCNC: 94 MG/DL (ref 74–106)
MAGNESIUM SERPL-MCNC: 2.1 MG/DL (ref 1.8–2.4)
POTASSIUM SERPLBLD-SCNC: 4.1 MMOL/L (ref 3.5–5.1)
PROT SERPL-MCNC: 5.8 G/DL (ref 6.4–8.3)
SODIUM SERPL-SCNC: 140 MMOL/L (ref 136–145)

## 2018-12-10 RX ADMIN — GABAPENTIN SCH MG: 300 CAPSULE ORAL at 14:07

## 2018-12-10 RX ADMIN — MAGNESIUM OXIDE TAB 400 MG (241.3 MG ELEMENTAL MG) SCH MG: 400 (241.3 MG) TAB at 09:33

## 2018-12-10 RX ADMIN — LOSARTAN POTASSIUM AND HYDROCHLOROTHIAZIDE TABLETS SCH TAB: 50; 12.5 TABLET, FILM COATED ORAL at 09:32

## 2018-12-10 RX ADMIN — CEPHALEXIN SCH MG: 500 CAPSULE ORAL at 12:30

## 2018-12-10 RX ADMIN — BUDESONIDE AND FORMOTEROL FUMARATE DIHYDRATE SCH PUFF: 160; 4.5 AEROSOL RESPIRATORY (INHALATION) at 09:33

## 2018-12-10 RX ADMIN — ENOXAPARIN SODIUM SCH MG: 30 INJECTION SUBCUTANEOUS at 09:33

## 2018-12-10 RX ADMIN — CEPHALEXIN SCH MG: 500 CAPSULE ORAL at 06:30

## 2018-12-10 RX ADMIN — NYSTATIN SCH APPLIC: 100000 POWDER TOPICAL at 14:00

## 2018-12-10 RX ADMIN — ASPIRIN 81 MG SCH MG: 81 TABLET ORAL at 09:32

## 2018-12-10 RX ADMIN — LEVOTHYROXINE SODIUM SCH MCG: 150 TABLET ORAL at 06:29

## 2018-12-10 RX ADMIN — GABAPENTIN SCH MG: 300 CAPSULE ORAL at 06:29

## 2018-12-10 RX ADMIN — LIDOCAINE SCH PATCH: 50 PATCH TOPICAL at 09:31

## 2018-12-10 RX ADMIN — ACETAMINOPHEN PRN MG: 325 TABLET ORAL at 12:30

## 2018-12-10 RX ADMIN — CEPHALEXIN SCH MG: 500 CAPSULE ORAL at 00:00

## 2018-12-10 RX ADMIN — ANASTROZOLE SCH MG: 1 TABLET, COATED ORAL at 09:32

## 2018-12-10 NOTE — DS
Physical Examination


Vital Signs: 


 Vital Signs











Temperature  97.6 F   12/10/18 14:17


 


Pulse Rate  78   12/10/18 14:17


 


Respiratory Rate  18   12/10/18 14:17


 


Blood Pressure  96/40 L  12/10/18 14:17


 


O2 Sat by Pulse Oximetry (%)  93 L  12/10/18 14:17











Constitutional: Yes: No Distress, Calm


Eyes: Yes: Conjunctiva Clear, EOM Intact


HENT: Yes: Atraumatic, Normocephalic


Neck: Yes: Supple, Trachea Midline


Cardiovascular: Yes: Regular Rate and Rhythm, S1, S2


Respiratory: Yes: Regular, CTA Bilaterally.  No: Rhonchi, Wheezes


Gastrointestinal: Yes: Normal Bowel Sounds, Soft, Abdomen, Obese


Edema: No


Peripheral Pulses WNL: Yes


Neurological: Yes: Alert, Oriented, Weakness (in the left lower etxremty)


...Motor Strength: LLE (weak,pain in the left foot)


Psychiatric: Yes: Alert, Oriented


Labs: 


 CBC, BMP





 12/07/18 09:29 





 12/10/18 07:03 











Discharge Summary


Reason For Visit: UTI/WEAKNESS


Other Procedures: po antibiotics


Condition: Stable





- Instructions


Diet, Activity, Other Instructions: 


ambulate as tolerated with a walker


follow  up in 2 weeks with Dr Cb Hein


Disposition: SKILLED NURSING FACILITY





- Home Medications


Comprehensive Discharge Medication List: 


Ambulatory Orders





Beta-Carotene(A) W-C and E/Min [Ocuvite (Nf) -] 1 tab PO DAILY 01/10/17 


Simvastatin 10 mg PO HS 01/10/17 


Acetaminophen [Tylenol .Regular Strength -] 650 mg PO Q6H PRN  tablet 01/19/18 


Anastrozole [Arimidex -] 1 mg PO DAILY  tablet 01/19/18 


Gabapentin [Neurontin -] 300 mg PO TID  capsule 01/19/18 


Glipizide Xl [Glucotrol Xl -] 5 mg PO DAILY@0700  tab.er.24 01/19/18 


Levothyroxine [Synthroid -] 100 mcg GT DAILY@0700  tablet 01/19/18 


Losartan 50Mg/Hctz 12.5MG [Hyzaar -] 1 tab PO DAILY  tablet 01/19/18 


Anastrozole [Arimidex -] 1 mg PO DAILY  tablet 12/06/18 


Budesonide/Formeterol Fumarate [SYMBICORT 160/4.5mcg -] 2 puff IH BID  inhaler 

12/06/18 


Cephalexin Monohydrate [Keflex -] 500 mg PO Q6HPO 7 Days #30 capsule 12/06/18 


Gabapentin [Neurontin -] 300 mg PO TID  capsule 12/06/18 


Levothyroxine [Synthroid -] 150 mcg PO DAILY@0700  tablet 12/06/18 


Losartan 50Mg/Hctz 12.5MG [Hyzaar -] 2 tab PO DAILY  tablet 12/06/18 


Acetaminophen [Tylenol .Regular Strength -] 650 mg PO Q4H PRN  tablet 12/07/18 


Aspirin [ASA -] 81 mg PO DAILY  tab.chew 12/07/18 


Atorvastatin Ca [Lipitor] 10 mg PO HS  tablet 12/07/18 


Enoxaparin [Lovenox -] 30 mg SQ DAILY  disp.syrin 12/07/18 


Lidocaine 5% Patch [Lidoderm -] 1 patch TP DAILY  patch 12/07/18 


Lidocaine Patch Removal [Lidoderm Patch Removal] 1 each MC DAILY@2200  each 12/ 07/18 


Glipizide [Glucotrol -] 2.5 mg PO DAILY@0700  tablet 12/10/18 


Magnesium Oxide [Mag-Ox -] 400 mg PO DAILY  tablet 12/10/18 


Nystatin Powder [Nystop Powder -] 1 applic TP TID  applic 12/10/18

## 2019-04-04 ENCOUNTER — HOSPITAL ENCOUNTER (EMERGENCY)
Dept: HOSPITAL 74 - FER | Age: 84
Discharge: HOME | End: 2019-04-04
Payer: COMMERCIAL

## 2019-04-04 VITALS — BODY MASS INDEX: 33.3 KG/M2

## 2019-04-04 VITALS — DIASTOLIC BLOOD PRESSURE: 67 MMHG | SYSTOLIC BLOOD PRESSURE: 160 MMHG | HEART RATE: 85 BPM | TEMPERATURE: 98.1 F
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
## 2019-04-04 DIAGNOSIS — S80.02XA: Primary | ICD-10-CM

## 2019-04-04 DIAGNOSIS — Z79.82: ICD-10-CM

## 2019-04-04 DIAGNOSIS — Z91.81: ICD-10-CM
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
## 2019-04-04 DIAGNOSIS — R29.6: ICD-10-CM

## 2019-04-04 DIAGNOSIS — E11.9: ICD-10-CM

## 2019-04-04 DIAGNOSIS — E78.5: ICD-10-CM

## 2019-04-04 DIAGNOSIS — N28.9: ICD-10-CM

## 2019-04-04 DIAGNOSIS — E03.9: ICD-10-CM

## 2019-04-04 DIAGNOSIS — Z88.0: ICD-10-CM

## 2019-04-04 DIAGNOSIS — Z99.89: ICD-10-CM

## 2019-04-04 DIAGNOSIS — I10: ICD-10-CM

## 2019-04-04 DIAGNOSIS — Z85.3: ICD-10-CM
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
## 2019-04-04 NOTE — PDOC
History of Present Illness





- General


History Source: Patient


Exam Limitations: No Limitations





- History of Present Illness


Initial Comments: 





04/04/19 18:17


The patient is a 88 year old female, with a significant past medical history of 

multiple falls, hypertension, hyperlipidemia, DM, breast cancer (s/p lumpectomy-

left), and hypothyroidism,  who presents to the emergency department via ems 

accompanied by home health aide/friend for evaluation of left lower extremity 

pain s/p fall today. The patient states she was walking with her walker when 

her left leg buckled. She states she fell onto her bottom with her left lower 

extremity tucked under her. She denies any head trauma. She denies any 

preceding chest pain, dizziness, palpitations, dyspnea. She reports pain to her 

left knee and left shin s/p fall. She denies hip pain.





The patient denies chest pain, shortness of breath, headache and dizziness. The 

patient denies fever, chills, nausea, vomit, diarrhea and constipation. The 

patient denies dysuria, frequency, urgency and hematuria. 





Allergies: Penicillins, meperidine HCl 


Past surgical history: Appendectomy.


Social history: Lives alone, ambulates with the assistance of a walker. Former 

smoker (Quit 2009). Denies EtOH use and recreational drug use. 


Primary Care Physician: Dr. Lemons 














<Celestina Mccain - Last Filed: 04/04/19 18:17>





<Grant Piper - Last Filed: 04/04/19 18:40>





- General


Chief Complaint: Injury


Stated Complaint: FELL, LEFT KNEE PAIN


Time Seen by Provider: 04/04/19 15:44





Past History





<Celestina Mccain - Last Filed: 04/04/19 18:17>





- Past Medical History


Anemia: No


Asthma: No


Cancer: Yes (LEFT BREAST LOBECTOMY)


Cardiac Disorders: Yes (HTN)


CVA: No


COPD: No


CHF: Yes


Dementia: No


Diabetes: Yes


GI Disorders: No


 Disorders: Yes (RENAL INSUFFFICIENCY)


HTN: Yes


Hypercholesterolemia: Yes


Liver Disease: No


Seizures: Yes


Thyroid Disease: Yes


Other medical history: MULTIPLE SCELEROSIS





- Surgical History


Abdominal Surgery: No


Appendectomy: Yes


Cardiac Surgery: No


Lung Surgery: No


Neurologic Surgery: No


Orthopedic Surgery: Yes (left ankle with hardware)





- Suicide/Smoking/Psychosocial Hx


Smoking History: Never smoked


Have you smoked in the past 12 months: No


Number of Cigarettes Smoked Daily: 0


If you are a former smoker, when did you quit?: 2009


Hx Alcohol Use: No


Drug/Substance Use Hx: No


Substance Use Type: None


Hx Substance Use Treatment: No





<Grant Piper - Last Filed: 04/04/19 18:40>





- Past Medical History


Allergies/Adverse Reactions: 


 Allergies











Allergy/AdvReac Type Severity Reaction Status Date / Time


 


Penicillins Allergy  Rash Verified 06/23/17 11:05


 


meperidine HCl [From Demerol] AdvReac   Verified 01/18/18 11:47











Home Medications: 


Ambulatory Orders





Beta-Carotene(A) W-C and E/Min [Ocuvite (Nf) -] 1 tab PO DAILY 01/10/17 


Simvastatin 10 mg PO HS 01/10/17 


Acetaminophen [Tylenol .Regular Strength -] 650 mg PO Q6H PRN  tablet 01/19/18 


Anastrozole [Arimidex -] 1 mg PO DAILY  tablet 12/06/18 


Budesonide/Formeterol Fumarate [SYMBICORT 160/4.5mcg -] 2 puff IH BID  inhaler 

12/06/18 


Gabapentin [Neurontin -] 300 mg PO TID  capsule 12/06/18 


Levothyroxine [Synthroid -] 150 mcg PO DAILY@0700  tablet 12/06/18 


Losartan 50Mg/Hctz 12.5MG [Hyzaar -] 2 tab PO DAILY  tablet 12/06/18 


Aspirin [ASA -] 81 mg PO DAILY  tab.chew 12/07/18 


Calcium Carbonate/Vitamin D3 [Calcium 500-Vit D3 600 Caplet] 2 each PO DAILY 04/ 04/19 


Cholecalciferol (Vitamin D3) [Vitamin D] 2,000 unit PO DAILY 04/04/19 


Furosemide [Lasix] 20 mg PO DAILY 04/04/19 


Potassium Chloride [Klor-Con M20] 20 meq PO DAILY 04/04/19 











**Review of Systems





- Review of Systems


Able to Perform ROS?: Yes


Comments:: 





04/04/19 18:17








ROS:  A complete review of 10 out of 10 review of systems is taken and is 

negative apart from what is previously mentioned below and in the HPI.








Constitutional: No recent illness; no fever


ENT: No sore throat


Cardiovascular: No palpitations; no chest pain


Pulmonary: No cough; no trouble breathing


Gastrointestinal: No nausea; no vomiting; no diarrhea


Genitourinary: No urinary problems; no hematuria


Skin: No rash


Lymph system: No swollen glands


Musculoskeletal: (+) LLE pain. No joint swelling


Neurological: No weakness; No numbness; No Headache; no vertigo; no 

lightheadedness


Psychiatric:No anxiety; no depression











<Celestina Mccain - Last Filed: 04/04/19 18:17>





*Physical Exam





- Vital Signs


 Last Vital Signs











Temp Pulse Resp BP Pulse Ox


 


 98.1 F   85   18   160/67   99 


 


 04/04/19 15:32  04/04/19 15:32  04/04/19 15:32  04/04/19 15:32  04/04/19 15:32














- Physical Exam


Comments: 





04/04/19 18:18





Vitals: Triage vital signs reviewed


General Appearance: No acute distress, well nourished, well developed


Head: Atraumatic


Eyes: Pupils equal reactive round, extraocular movement intact


Neck:  Supple; No nuchal rigidity


Chest Wall: Nontender


Cardiac: Regular rate and rhythm, no murmurs, no rubs, no gallops


Lungs: Clear to auscultation bilateral, good air movement bilaterally


Abdomen:  Soft, nondistended, normal bowel sounds, nontender to palpation


Extremities: (+) left knee tenderness, left shin tenderness. No bony 

deformities. Full range of motion to all extremities and joints, no cyanosis, 

clubbing, or edema


Skin:  (+) localized area of erythema to left ankle. Warm and dry, no rashes or 

lesions, no petechiae


Neuro:  AOX3; Cranial Nerves 2-12 grossly intact, Strength intact to all 

extremities, Sensation intact to all extremities, gait normal


Psych: Normal mood, normal affect








<Celestina Mccain - Last Filed: 04/04/19 18:17>





- Vital Signs


 Last Vital Signs











Temp Pulse Resp BP Pulse Ox


 


 98.1 F   85   18   160/67   99 


 


 04/04/19 15:32  04/04/19 15:32  04/04/19 15:32  04/04/19 15:32  04/04/19 15:32














<Grant Piper - Last Filed: 04/04/19 18:40>





ED Treatment Course





- RADIOLOGY


Radiograph Interpretation: 








EXAM#:     TYPE/EXAM: RESULT: 


[id number] RAD/KNEE 3 POS-LEFT 


Left knee: Pain. Fall. 


 


 2 views of the left knee reveal no sign of fracture or subluxation is on a 

blastic or lytic changes. Swelling, foreign body or soft tissue is not seen. 

There are vascular calcifications. If symptoms persist, further imaging may be 

of help. 


 


 


 Impression: No acute left knee pathology. 


 





 Reported By: Charles Mead MD 


 04/04/19 1752 


-------------------------------------------------------------


EXAM#:     TYPE/EXAM: RESULT: 


[id number] RAD/LEG TIB/FIB-LEFT 


Left tibia and fibula: Fall. Pain. 


 


 AP and lateral views reveal previous stabilization of distal tibial and 

fibular fractures with screws. There is no sign of an acute fracture or 

subluxation at this time. There are degenerative knee changes. There are some 

soft tissue calcifications. There is no sign of blastic or lytic changes. If 

symptoms persist, further imaging may be of help 


 





 Reported By: Charles Mead MD 


 04/04/19 1753 


 





-----------------------------------------------------


 


EXAM#:     TYPE/EXAM: RESULT: 


[id number] RAD/ANKLE-LEFT 


Left ankle: Pain 


 


 3 views of the left ankle reveal previous stabilization of a distal tibial and 

fibular fracture 


with screws. There is no sign of an acute fracture at this time. Swelling, 

foreign body or soft 


tissue air is not seen. There is no sign of calcaneal spurring. Since the prior 

study of 12/6/2018, 


there is no change of an adverse nature. If symptoms persist, further imaging 

may be of help 


 


 


 Reported By: Charles Mead MD 


 04/04/19 1053 








- Medications


Given in the ED: 


ED Medications














Discontinued Medications














Generic Name Dose Route Start Last Admin





  Trade Name Freq  PRN Reason Stop Dose Admin


 


Ibuprofen  600 mg  04/04/19 17:55  04/04/19 17:55





  Motrin -  PO  04/04/19 17:56  600 mg





  ONCE ONE   Administration





     





     





     





     














<Celestina Mccain - Last Filed: 04/04/19 18:17>





Medical Decision Making





- Medical Decision Making





04/04/19 18:38





Mechanical trip and fall onto left knee status post Motrin patient able to 

ambulate with walker





Likely musculoskeletal knee sprain with small effusion not a good candidate for 

crutches





We'll recommend ice rest elevation and orthopedic follow-up





Patient ambulate comfortably able to return home no acute fracture dislocation 

noted on x-rays





Findings, the need for follow-up and strict return instructions discussed with 

patient.








<Grant Piper - Last Filed: 04/04/19 18:40>





*DC/Admit/Observation/Transfer





- Attestations


Scribe Attestion: 





04/04/19 18:18





Documentation prepared by Celestina Mccain, acting as medical scribe for Grant Piper MD





<Celestina Mccain - Last Filed: 04/04/19 18:17>





- Discharge Dispostion


Decision to Admit order: No





<Grant Piper - Last Filed: 04/04/19 18:40>


Diagnosis at time of Disposition: 


Knee contusion


Qualifiers:


 Encounter type: initial encounter Laterality: left Qualified Code(s): S80.02XA 

- Contusion of left knee, initial encounter








- Discharge Dispostion


Disposition: HOME


Condition at time of disposition: Good





- Referrals


Referrals: 


Melania Lemons MD [Primary Care Provider] - 


Mike Vivar MD [Staff Physician] - 





- Patient Instructions


Printed Discharge Instructions:  Contusion


Additional Instructions: 


Ice knee 20 minutes on 20 minutes off. Keep elevated as much as possible. Rest. 

Take over-the-counter Tylenol and Motrin alternating every 3 hours as needed 

for pain. Return to ED for any severe worsening symptoms otherwise follow-up 

with Dr. Vivar orthopedics on Monday.











- Post Discharge Activity

## 2019-04-25 ENCOUNTER — APPOINTMENT (OUTPATIENT)
Dept: BREAST CENTER | Facility: CLINIC | Age: 84
End: 2019-04-25
Payer: MEDICARE

## 2019-04-25 VITALS
DIASTOLIC BLOOD PRESSURE: 49 MMHG | HEART RATE: 79 BPM | BODY MASS INDEX: 38.64 KG/M2 | SYSTOLIC BLOOD PRESSURE: 137 MMHG | HEIGHT: 62 IN | WEIGHT: 210 LBS

## 2019-04-25 DIAGNOSIS — Z80.3 FAMILY HISTORY OF MALIGNANT NEOPLASM OF BREAST: ICD-10-CM

## 2019-04-25 DIAGNOSIS — G35 MULTIPLE SCLEROSIS: ICD-10-CM

## 2019-04-25 DIAGNOSIS — R92.8 OTHER ABNORMAL AND INCONCLUSIVE FINDINGS ON DIAGNOSTIC IMAGING OF BREAST: ICD-10-CM

## 2019-04-25 DIAGNOSIS — C50.912 MALIGNANT NEOPLASM OF UNSPECIFIED SITE OF LEFT FEMALE BREAST: ICD-10-CM

## 2019-04-25 PROCEDURE — 99214 OFFICE O/P EST MOD 30 MIN: CPT

## 2019-04-25 RX ORDER — GLIPIZIDE 5 MG/1
5 TABLET ORAL
Refills: 0 | Status: DISCONTINUED | COMMUNITY
End: 2019-04-25

## 2019-04-25 RX ORDER — BUDESONIDE AND FORMOTEROL FUMARATE DIHYDRATE 160; 4.5 UG/1; UG/1
160-4.5 AEROSOL RESPIRATORY (INHALATION)
Refills: 0 | Status: ACTIVE | COMMUNITY

## 2019-04-25 RX ORDER — FUROSEMIDE 20 MG/1
20 TABLET ORAL
Refills: 0 | Status: ACTIVE | COMMUNITY

## 2019-04-25 RX ORDER — POLYETHYLENE GLYCOL 400 AND PROPYLENE GLYCOL 4; 3 MG/ML; MG/ML
SOLUTION/ DROPS OPHTHALMIC
Refills: 0 | Status: ACTIVE | COMMUNITY

## 2019-04-25 NOTE — PHYSICAL EXAM
[Normocephalic] : normocephalic [Atraumatic] : atraumatic [No Supraclavicular Adenopathy] : no supraclavicular adenopathy [Supple] : supple [No Cervical Adenopathy] : no cervical adenopathy [No Thyromegaly] : no thyromegaly [Normal Sinus Rhythm] : normal sinus rhythm [Examined in the supine and seated position] : examined in the supine and seated position [No dominant masses] : no dominant masses in right breast  [No dominant masses] : no dominant masses left breast [No Nipple Retraction] : no left nipple retraction [No Nipple Discharge] : no left nipple discharge [No Axillary Lymphadenopathy] : no left axillary lymphadenopathy [No Edema] : no edema [No Rashes] : no rashes [No Ulceration] : no ulceration [de-identified] : PMX/Ax/RT. NER. %. No lymphedema.

## 2019-04-25 NOTE — HISTORY OF PRESENT ILLNESS
[FreeTextEntry1] : L Br Ca detected on Scr Mammo (9/29/17)\par Mammo/Sono (9/29/17): +0.8cm susp mass L 7:00 > Bx rec'ed\par S/P L Sono Core Bx (L 7:00(10/18/17): +IDCA, SBR II, ER+, CO+, Her2 -, Ki67: 30%\par Started Arimidex (11/17)([facility])\par S/P L PMX/Ax/RT (1999)([facility] [place])\par S/P tmx x 5yrs ([facility])\par S/P Arimidex x 5 yrs (completed (2009)\par S/P R Br Bx (1997): Benign\par +FH Br Ca (Mother 60's, M. Aunt 60's)\par  +Ashkenazi ancestry\par BRCA (multisite)(11/3/17): -\par Pt has MS and is wheelchair bound\par No MH/FH changes. ROS reviewed/discussed. Taking Vit D.\par R F/U Mammo/Sono (4/3/19): +stable IMLN's. NSF\par Mammo/Sono (10/2/18): +R IM LN's > nl appearing > Rad'st rec's F/U R Mammo/R Sono (4/19)\par L Mammo (3/15/18): +0.8cm irreg mass L 8:00

## 2019-08-04 ENCOUNTER — HOSPITAL ENCOUNTER (INPATIENT)
Dept: HOSPITAL 74 - FER | Age: 84
LOS: 5 days | Discharge: HOME | DRG: 603 | End: 2019-08-09
Attending: INTERNAL MEDICINE | Admitting: INTERNAL MEDICINE
Payer: COMMERCIAL

## 2019-08-04 VITALS — BODY MASS INDEX: 38.4 KG/M2

## 2019-08-04 DIAGNOSIS — C50.912: ICD-10-CM

## 2019-08-04 DIAGNOSIS — E66.9: ICD-10-CM

## 2019-08-04 DIAGNOSIS — J44.9: ICD-10-CM

## 2019-08-04 DIAGNOSIS — E78.5: ICD-10-CM

## 2019-08-04 DIAGNOSIS — L03.116: Primary | ICD-10-CM

## 2019-08-04 DIAGNOSIS — M25.562: ICD-10-CM

## 2019-08-04 DIAGNOSIS — E86.0: ICD-10-CM

## 2019-08-04 DIAGNOSIS — I25.10: ICD-10-CM

## 2019-08-04 DIAGNOSIS — I10: ICD-10-CM

## 2019-08-04 DIAGNOSIS — E11.69: ICD-10-CM

## 2019-08-04 DIAGNOSIS — G35: ICD-10-CM

## 2019-08-04 DIAGNOSIS — E03.9: ICD-10-CM

## 2019-08-04 LAB
ALBUMIN SERPL-MCNC: 3.7 G/DL (ref 3.4–5)
ALP SERPL-CCNC: 81 U/L (ref 45–117)
ALT SERPL-CCNC: 17 U/L (ref 13–61)
ANION GAP SERPL CALC-SCNC: 8 MMOL/L (ref 8–16)
AST SERPL-CCNC: 21 U/L (ref 15–37)
BASOPHILS # BLD: 0.7 % (ref 0–2)
BILIRUB SERPL-MCNC: 0.5 MG/DL (ref 0.2–1)
BUN SERPL-MCNC: 75 MG/DL (ref 7–18)
CALCIUM SERPL-MCNC: 9.2 MG/DL (ref 8.5–10)
CHLORIDE SERPL-SCNC: 102 MMOL/L (ref 98–107)
CO2 SERPL-SCNC: 31 MMOL/L (ref 21–32)
CREAT SERPL-MCNC: 1.6 MG/DL (ref 0.55–1.3)
DEPRECATED RDW RBC AUTO: 14.8 % (ref 11.6–15.6)
EOSINOPHIL # BLD: 3 % (ref 0–4.5)
GLUCOSE SERPL-MCNC: 133 MG/DL (ref 74–106)
HCT VFR BLD CALC: 35.5 % (ref 32.4–45.2)
HGB BLD-MCNC: 11.5 GM/DL (ref 10.7–15.3)
LYMPHOCYTES # BLD: 11.1 % (ref 8–40)
MCH RBC QN AUTO: 30 PG (ref 25.7–33.7)
MCHC RBC AUTO-ENTMCNC: 32.3 G/DL (ref 32–36)
MCV RBC: 92.7 FL (ref 80–96)
MONOCYTES # BLD AUTO: 5.8 % (ref 3.8–10.2)
NEUTROPHILS # BLD: 79.4 % (ref 42.8–82.8)
PLATELET # BLD AUTO: 296 K/MM3 (ref 134–434)
PMV BLD: 10.7 FL (ref 7.5–11.1)
POTASSIUM SERPLBLD-SCNC: 4.2 MMOL/L (ref 3.5–5.1)
PROT SERPL-MCNC: 7.4 G/DL (ref 6.4–8.2)
RBC # BLD AUTO: 3.83 M/MM3 (ref 3.6–5.2)
SODIUM SERPL-SCNC: 141 MMOL/L (ref 136–145)
WBC # BLD AUTO: 11.4 K/MM3 (ref 4–10.8)

## 2019-08-04 RX ADMIN — ATORVASTATIN CALCIUM SCH MG: 10 TABLET, FILM COATED ORAL at 22:14

## 2019-08-04 RX ADMIN — GABAPENTIN SCH MG: 400 CAPSULE ORAL at 22:14

## 2019-08-04 NOTE — PDOC
Attending Attestation





- Resident


Resident Name: Jaclyn Vargas





- ED Attending Attestation


I have performed the following: I have examined & evaluated the patient, The 

case was reviewed & discussed with the resident, I agree w/resident's findings 

& plan





- HPI


HPI: 





08/04/19 13:20


88 y/o female with rash to both legs failed on out patient antibiotics. No 

fever or chills. No N/V/D/C.  No fall or trauma. On Cephalexin.  Her PMD wants 

her to be admitted for antibiotic therapy.





- Physicial Exam


PE: 





08/04/19 13:22


Vitals stable


HEENT; unremarkable


Heart: RRR w/o murmur


Lungs: CTA b/l, no wheezing


Abd: soft non tender +BS


EXT b/l erythema with blisters, non tender warm, pulses 2+/4 b/l in LE


Neuro: no focal deficits noted





- Medical Decision Making





08/04/19 14:05


Patient with failed outpatient antibiotic


Resident spoke with Dr. Lemons, will admit





EKG; 77 RBBB, no STEMI


CXR: Cardiomegaly, NAD





Patient is in agreement with plan, Patient failed on Cephalexin but wants 

patient on Rocephin.


Recommend IV Clindamyin.  Pt will be admitted








Dx: Cellulitis legs


08/04/19 14:32


US legs negative for DVT

## 2019-08-04 NOTE — HP
Admitting History and Physical





- Admission


Chief Complaint: edema and erythema of the lower extremities.  pain in the 

lower extremities.  inability to walk


History of Present Illness: 





88 yo female with PMH of MS Diabetes type 2 and left Breast cancer with recent 

recurrence in the same  left breast, was treated during the past 2 weeks with 

oral antibiotics Keflex and increased doses of LAsix for her bilateral lower 

extremities edema and cellulitis. The patient's cellulitis got worse with 

oozing blisters present on the dorsal aspect of the left calf and yesterday the 

patient  started to experience increased weakness and pain  in the left knee 

and inability to walk. She has been complaining of extreme pain and cramps in 

both her calves which in the past improved on BAclofen. Pain is better when her 

legs are dangling at the margin of the bed. She is not walking too much and she 

can not  assess if she has leg cramps with ambulation. The patient lives alone 

and has an aide who provides care for a limited number of hours daily.


History Source: Patient





- Past Medical History


CNS: Yes: Multiple Sclerosis


Cardiovascular: Yes: HTN, Hyperlipdemia.  No: AFIB, CAD, CHF


Gastrointestinal: Yes: Constipation


Renal/: Yes: Neurogenic Bladder


Reproductive: Yes: Postmenopausal


Heme/Onc: Yes: Cancer (left breast cancer)


Musculoskeletal: Yes: Osteoarthritis, Other (left lower extremity weakness)


Endocrine: Yes: Diabetes Mellitus





- Smoking History


Smoking history: Former smoker


Have you smoked in the past 12 months: No


Aproximately how many cigarettes per day: 0


If you are a former smoker, when did you quit?: 2009





- Alcohol/Substance Use


Hx Alcohol Use: No





- Social History


History of Recent Travel: No





Home Medications





- Allergies


Allergies/Adverse Reactions: 


 Allergies











Allergy/AdvReac Type Severity Reaction Status Date / Time


 


Penicillins Allergy  Rash Verified 08/04/19 12:35


 


meperidine HCl [From Demerol] AdvReac   Verified 08/04/19 12:35


 


lobster Allergy   Uncoded 08/04/19 12:35














- Home Medications


Home Medications: 


Ambulatory Orders





Simvastatin 10 mg PO HS 01/10/17 


Anastrozole [Arimidex -] 1 mg PO DAILY  tablet 12/06/18 


Budesonide/Formeterol Fumarate [SYMBICORT 160/4.5mcg -] 2 puff IH BID  inhaler 

12/06/18 


Gabapentin [Neurontin -] 300 mg PO TID  capsule 12/06/18 


Levothyroxine [Synthroid -] 150 mcg PO DAILY@0700  tablet 12/06/18 


Losartan 50Mg/Hctz 12.5MG [Hyzaar -] 2 tab PO DAILY  tablet 12/06/18 


Aspirin [ASA -] 81 mg PO DAILY  tab.chew 12/07/18 


Calcium Carbonate/Vitamin D3 [Calcium 500-Vit D3 600 Caplet] 2 each PO DAILY 04/ 04/19 


Cholecalciferol (Vitamin D3) [Vitamin D] 2,000 unit PO DAILY 04/04/19 


Furosemide [Lasix] 20 mg PO DAILY 04/04/19 


Potassium Chloride [Klor-Con M20] 20 meq PO DAILY 04/04/19 











Family Disease History





- Family Disease History


Family Disease History: Heart Disease: Father (fatal MI 63), Other: Son (2 sons 

with multiple sclerosis)





Review of Systems





- Review of Systems


Constitutional: reports: Weakness


Eyes: reports: No Symptoms


HENT: reports: No Symptoms


Neck: reports: No Symptoms


Cardiovascular: reports: Edema (+2 of bothe lower extremities).  denies: 

Palpitations, Shortness of Breath


Respiratory: denies: Orthopnea, SOB on Exertion, Wheezing


Gastrointestinal: denies: No Symptoms


Genitourinary: reports: Incontinence, Other (incontinence)


Breasts: reports: No Symptoms Reported, Other (left breast mass)


Musculoskeletal: reports: Extremity Pain (let knee pain), Joint Pain (left knee)

, Muscle Cramps (in thealves bilaterally)


Neurological: reports: Pre-Existing Deficit (weak left lower extremity), 

Unsteady Gait, Weakness


Endocrine: reports: No Symptoms


Hematology/Lymphatic: reports: No Symptoms


Psychiatric: reports: No Symptoms





Physical Examination


Vital Signs: 


 Vital Signs











Temperature  98.8 F   08/04/19 14:33


 


Pulse Rate  74   08/04/19 14:33


 


Respiratory Rate  18   08/04/19 14:33


 


Blood Pressure  143/53 L  08/04/19 14:33


 


O2 Sat by Pulse Oximetry (%)  97   08/04/19 14:33











Constitutional: Yes: Well Nourished, No Distress, Calm


Eyes: Yes: Conjunctiva Clear, EOM Intact


HENT: Yes: Atraumatic, Normocephalic


Neck: Yes: Supple, Trachea Midline


Cardiovascular: Yes: Regular Rate and Rhythm, S1, S2


Respiratory: Yes: Regular, CTA Bilaterally


Gastrointestinal: Yes: Normal Bowel Sounds, Soft, Abdomen, Obese.  No: 

Hepatomegaly, Splenomegaly


...Rectal Exam: Yes: Deferred


Renal/: Yes: Incontinence


Musculoskeletal: Yes: Muscle Weakness (left lower extremity)


Extremities: Yes: Erythema (of both calves and shins).  No: Calf Tenderness


Edema: Yes


Edema: LLE: 2+, RLE: 2+


Peripheral Pulses WNL: No (cant  not evaluate due to edema)


Integumentary: Yes: Erythema, Venous Stasis Changes, Other (edema +2. serous 

oozing blisters present on the posterior aspect of the left calf and yellowish 

crusts covering the left posterior leg,)


Neurological: Yes: Alert, Oriented


Labs: 


 CBC, BMP





 08/04/19 13:08 





 08/04/19 13:08 











Imaging





- Results


Chest X-ray: Other (r by me : no pleural effusion, no infiltrate, no 

cardiomegaly)





Problem List





- Problems


(1) Cellulitis of left leg


Assessment/Plan: 


Ceftriaxone 1 gm iv daily


wash oozing area with water and soap and apply zinc cream daily


Code(s): L03.116 - CELLULITIS OF LEFT LOWER LIMB   





(2) Multiple sclerosis not affecting current episode of care


Assessment/Plan: 


Baclofen helped in the past with cramps but it affected and decreased the 

patient's mobility, she would be interested in trying something else


increased Neurintin to 400 mg tis


add Tylenol every 8 hours


 Neurology consult


Code(s): IVY8488 -    





(3) Left knee pain


Assessment/Plan: 


Tylenol


Ortho consult


R/O PAD: arterial doppler


Code(s): M25.562 - PAIN IN LEFT KNEE   





(4) Diabetes mellitus type 2 in obese


Assessment/Plan: 


continue Glucophage


Code(s): E11.69 - TYPE 2 DIABETES MELLITUS WITH OTHER SPECIFIED COMPLICATION; 

E66.9 - OBESITY, UNSPECIFIED   





(5) Hypothyroidism


Assessment/Plan: 


synthroid


Code(s): E03.9 - HYPOTHYROIDISM, UNSPECIFIED   





(6) HTN (hypertension)


Code(s): I10 - ESSENTIAL (PRIMARY) HYPERTENSION   





(7) Breast cancer, left


Assessment/Plan: 


Arimidex 1 mg daily


Code(s): C50.912 - MALIGNANT NEOPLASM OF UNSPECIFIED SITE OF LEFT FEMALE BREAST

## 2019-08-04 NOTE — PDOC
History of Present Illness





- General


Chief Complaint: Edema


Stated Complaint: SWELLING IN BOTH LEGS


Time Seen by Provider: 08/04/19 12:43





- History of Present Illness


Initial Comments: 





08/04/19 12:46


89yo F hx MS, COPD, hypertension, hyperlipidemia, DM, breast cancer (s/p 

lumpectomy-left), and hypothyroidism presents to the emergency department from 

home sent by PCP for admission for IV abx tx of b/l LE cellulitis. Pt states 

she has had an infection to her b/l LEs for 2 weeks, dx'd by her PCP Dr Arevalo. Pt states she completed her course of prescribed PO antibiotics, which 

she believes was Cephalexin, without improvement. Pt called her PCP today and 

and sent her a photo of her legs and was told to come in to the ED for 

admission for IV abx. Pt states she lives at home alone with an aide helping 

during the week. Pt normally walks with a walker but was unable to walk today 

due to infection. Presents in wheelchair. Endorses b/l leg swelling, redness, 

blistering, warmth, and pain. Denies trauma, injury, F/C, N/V, HA, dizziness, CP

, SOB, cough, abdominal pain, dysuria, hematuria, D/C, blood in stool, numbness/

tingling, weakness, hx CHF or leg swelling, hx DVTs/PE, recent travel, sick 

contacts. Pt states she did have an infection in her legs a year ago and was 

admitted to the hospital for abx.








Past History





- Past Medical History


Allergies/Adverse Reactions: 


 Allergies











Allergy/AdvReac Type Severity Reaction Status Date / Time


 


Penicillins Allergy  Rash Verified 08/04/19 12:35


 


meperidine HCl [From Demerol] AdvReac   Verified 08/04/19 12:35


 


lobster Allergy   Uncoded 08/04/19 12:35











Home Medications: 


Ambulatory Orders





Simvastatin 10 mg PO HS 01/10/17 


Anastrozole [Arimidex -] 1 mg PO DAILY  tablet 12/06/18 


Budesonide/Formeterol Fumarate [SYMBICORT 160/4.5mcg -] 2 puff IH BID  inhaler 

12/06/18 


Gabapentin [Neurontin -] 300 mg PO TID  capsule 12/06/18 


Levothyroxine [Synthroid -] 150 mcg PO DAILY@0700  tablet 12/06/18 


Losartan 50Mg/Hctz 12.5MG [Hyzaar -] 2 tab PO DAILY  tablet 12/06/18 


Aspirin [ASA -] 81 mg PO DAILY  tab.chew 12/07/18 


Calcium Carbonate/Vitamin D3 [Calcium 500-Vit D3 600 Caplet] 2 each PO DAILY 04/ 04/19 


Cholecalciferol (Vitamin D3) [Vitamin D] 2,000 unit PO DAILY 04/04/19 


Furosemide [Lasix] 20 mg PO DAILY 04/04/19 


Potassium Chloride [Klor-Con M20] 20 meq PO DAILY 04/04/19 








Anemia: No


Asthma: No


Cancer: Yes (LEFT BREAST LOBECTOMY)


Cardiac Disorders: Yes (HTN)


CVA: No


COPD: No


CHF: Yes


Dementia: No


Diabetes: Yes


GI Disorders: No


 Disorders: Yes (RENAL INSUFFFICIENCY)


HTN: Yes


Hypercholesterolemia: Yes


Liver Disease: No


Seizures: Yes


Thyroid Disease: Yes





- Surgical History


Abdominal Surgery: No


Appendectomy: Yes


Cardiac Surgery: No


Lung Surgery: No


Neurologic Surgery: No


Orthopedic Surgery: Yes (left ankle with hardware)





- Suicide/Smoking/Psychosocial Hx


Smoking History: Never smoked


Have you smoked in the past 12 months: No


Number of Cigarettes Smoked Daily: 0


If you are a former smoker, when did you quit?: 2009


Hx Alcohol Use: No


Drug/Substance Use Hx: No


Substance Use Type: None


Hx Substance Use Treatment: No





**Review of Systems





- Review of Systems


Comments:: 





08/04/19 16:06





Constitutional: Negative for chills, fever, fatigue.


HENT: Negative for sore throat, rhinorrhea, congestion.


Eyes: Negative for visual disturbance.


Respiratory: Negative for shortness of breath, cough, and wheezing.


Cardiovascular: Negative for chest pain, palpitations.


Gastrointestinal: Negative for abdominal pain, blood in stool, constipation, 

diarrhea, nausea, and vomiting.


Genitourinary: Negative for dysuria, flank pain, and hematuria.


Musculoskeletal: Positive for b/l LE pain. Negative for myalgias, back pain, 

and neck pain.


Skin: Positive for b/l LE redness, warmth, blistering, and pain.


Neurological: Negative for light-headedness, dizziness, syncope, weakness, 

numbness and headaches.


Psychiatric/Behavioral: Negative for behavioral problems and confusion.








*Physical Exam





- Vital Signs


 Last Vital Signs











Temp Pulse Resp BP Pulse Ox


 


 98.6 F   84   19   158/55 L  96 


 


 08/04/19 12:34  08/04/19 12:34  08/04/19 12:34  08/04/19 12:34  08/04/19 12:34














- Physical Exam


Comments: 





08/04/19 16:07


Gen: Alert, NAD, comfortable-appearing.


HEENT: PERRL, EOMI, MMM, NCAT. No conjunctival pallor. Sclera are non-icteric.


CV: Regular rate and rhythm. No murmurs, rubs, or gallops.


PULM: No resp distress. CTAB, no wheezes, rales, or rhonchi.


ABD: soft, NT/ND, no rebound tenderness or guarding, no CVA tenderness.


BACK: No TTP of c/t/l-spine. No step-offs or deformities.


MSK: No bony deformities. 2+ pulses in all extremities.


NEURO: AAOx3. PERRL. No gross CN deficits. Strength and sensation grossly 

intact throughout.


EXTREMITIES: No cyanosis. No clubbing. No edema.


LEs: + b/l LE circumferential warmth and erythema with blisters w/serous 

exudate from ankles to inferior to knees. No red streaks or crepitus. +b/l 

pitting edema to knees. No TTP of erythema. Sensation to light touch intact 

throughout. Full ROM without pain of b/l ankles. <2 sec cap refill. Mild b/l 

calf TTP.


PSYCH: Normal mood and thought pattern.


SKIN: Warm and dry. Normal capillary refill. No jaundice. 








**Heart Score/ECG Review





- ECG Impressions


Comment:: 





08/04/19 15:14


Sinus rhythm w/PACs, 77bpm, L axis deviation, RBBB, no STEs/TWIs. No 

significant concerning changes from 12/6/18.





ED Treatment Course





- LABORATORY


CBC & Chemistry Diagram: 


 08/04/19 13:08





 08/04/19 13:08





Medical Decision Making





- Medical Decision Making


89yo F hx MS, COPD, hypertension, hyperlipidemia, DM, breast cancer (s/p 

lumpectomy-left), and hypothyroidism presents to the emergency department from 

home sent by PCP for admission for IV abx tx for b/l LE cellulitis x2wks, 

failed outpt PO Cephalexin. Hemodynamically stable, afebrile, unable to 

ambulate 2/2 pain, b/l LEs neurovascularly intact, cellulitis circumferential b/

l LEs from ankles to inferior to knees, b/l calf TTP.





Most likely cellulitis which failed outpt tx - admit and start IV abx. No TTP, 

pain out of proportion to exam, crepitus, or fever concerning for necrotizing 

fasciitis or gangrene. No systemic symptoms indicative of bacteremia. Consider 

and r/o DVT w/US due to b/l calf tenderness and hx of cancer.





-CBC, CMP


-EKG and CXR for admission


-Dispo: admit for IV abx





EKG: sinus rhythm w/PACs, 77bpm, L axis deviation, RBBB, no STEs/TWIs. No 

significant concerning changes from 12/6/18.


CXR: large heart, sclerotic knob, no acute findings





08/04/19 13:21


Attending Manju requests Clindamycin due to PCN allergy and failure of PO 

cephalexin.


Spoke with on call doctor for Dr Lemons. Doctor is aware of PCN allergy and 

failure of outpatient tx but wants to give pt Ceftriaxone. Will follow order 

and admit.


Decision to admit order placed.





Labs reviewed. Of note, WBC 11.4.


Cr 1.6 (previously 1.4). Give 1L NS at rate 125.


Ceftriaxone started.





08/04/19 14:56


Pt admitted; taken to bed.


US: no evidence of DVT











*DC/Admit/Observation/Transfer


Diagnosis at time of Disposition: 


 Cellulitis








- Discharge Dispostion


Condition at time of disposition: Stable


Decision to Admit order: Yes





- Referrals


Referrals: 


Melania Lemons MD [Primary Care Provider] - 





- Patient Instructions





- Post Discharge Activity

## 2019-08-05 RX ADMIN — ENOXAPARIN SODIUM SCH MG: 30 INJECTION SUBCUTANEOUS at 10:10

## 2019-08-05 RX ADMIN — GABAPENTIN SCH MG: 400 CAPSULE ORAL at 06:14

## 2019-08-05 RX ADMIN — GLIPIZIDE SCH MG: 5 TABLET ORAL at 06:14

## 2019-08-05 RX ADMIN — LEVOTHYROXINE SODIUM SCH MCG: 112 TABLET ORAL at 06:15

## 2019-08-05 RX ADMIN — NYSTATIN SCH APPLIC: 100000 POWDER TOPICAL at 21:11

## 2019-08-05 RX ADMIN — ATORVASTATIN CALCIUM SCH MG: 10 TABLET, FILM COATED ORAL at 21:11

## 2019-08-05 RX ADMIN — ANASTROZOLE SCH MG: 1 TABLET, COATED ORAL at 10:09

## 2019-08-05 RX ADMIN — FUROSEMIDE SCH MG: 40 TABLET ORAL at 10:09

## 2019-08-05 RX ADMIN — NYSTATIN SCH APPLIC: 100000 POWDER TOPICAL at 16:02

## 2019-08-05 RX ADMIN — GABAPENTIN SCH MG: 400 CAPSULE ORAL at 14:50

## 2019-08-05 RX ADMIN — LOSARTAN POTASSIUM AND HYDROCHLOROTHIAZIDE TABLETS SCH TAB: 50; 12.5 TABLET, FILM COATED ORAL at 10:09

## 2019-08-05 RX ADMIN — GABAPENTIN SCH MG: 400 CAPSULE ORAL at 21:11

## 2019-08-05 RX ADMIN — POTASSIUM CHLORIDE SCH MEQ: 20 SOLUTION ORAL at 10:59

## 2019-08-05 NOTE — EKG
Test Reason : 

Blood Pressure : ***/*** mmHG

Vent. Rate : 077 BPM     Atrial Rate : 077 BPM

   P-R Int : 194 ms          QRS Dur : 156 ms

    QT Int : 460 ms       P-R-T Axes : 065 -58 -01 degrees

   QTc Int : 520 ms

 

SINUS RHYTHM WITH PREMATURE ATRIAL COMPLEXES

LEFT AXIS DEVIATION

RIGHT BUNDLE BRANCH BLOCK

ABNORMAL ECG

WHEN COMPARED WITH ECG OF 06-DEC-2018 01:18,

PREMATURE ATRIAL COMPLEXES ARE NOW PRESENT

T WAVE VARIATION

Confirmed by GLORIA MONTILLA, YI ([id number]) on 8/5/2019 11:54:30 AM

 

Referred By: BEST SHAH           Confirmed By:YI CASTRO MD

## 2019-08-05 NOTE — PN
Progress Note, Physician


Chief Complaint: 





cellulitis of lower extrewmities, weakness in lower extremities and muscle 

cramps, left knee instability/weakness


History of Present Illness: 





PAteint admitted for treatment of cellulitis of the lower extremities and 

weakness in the lower extremities





- Current Medication List


Current Medications: 


Active Medications





Acetaminophen (Tylenol -)  650 mg PO Q6H PRN


   PRN Reason: MODERATE PAIN (4-7)


Anastrozole (Arimidex -)  1 mg PO DAILY [facility]


   Last Admin: 08/05/19 10:09 Dose:  1 mg


Atorvastatin Calcium (Lipitor -)  10 mg PO HS [facility]


   Last Admin: 08/05/19 21:11 Dose:  10 mg


Enoxaparin Sodium (Lovenox -)  30 mg SQ DAILY [facility]


   Last Admin: 08/05/19 10:10 Dose:  30 mg


Furosemide (Lasix -)  40 mg PO DAILY [facility]


   Last Admin: 08/05/19 10:09 Dose:  40 mg


Gabapentin (Neurontin -)  400 mg PO TID [facility]


   Last Admin: 08/05/19 21:11 Dose:  400 mg


Glipizide (Glucotrol -)  2.5 mg PO DAILY@0700 [facility]


   Last Admin: 08/05/19 06:14 Dose:  2.5 mg


HCTZ/Losartan Potassium (Hyzaar -)  2 tab PO DAILY [facility]


   Last Admin: 08/05/19 10:09 Dose:  2 tab


Ceftriaxone Sodium 1 gm/ (Dextrose)  50 mls @ 100 mls/hr IVPB DAILY [facility]; 

Protocol


   Last Admin: 08/05/19 16:00 Dose:  100 mls/hr


Levothyroxine Sodium (Synthroid -)  112 mcg PO DAILY@0700 [facility]


   Last Admin: 08/05/19 06:15 Dose:  112 mcg


Nystatin (Nystop Powder -)  1 applic TP TID [facility]


   Last Admin: 08/05/19 21:11 Dose:  1 applic


Potassium Chloride (Potassium Chloride Oral Liquid)  20 meq PO DAILY [facility]


   Last Admin: 08/05/19 10:59 Dose:  20 meq











- Objective


Vital Signs: 


 Vital Signs











Temperature  98.7 F   08/05/19 22:41


 


Pulse Rate  80   08/05/19 22:41


 


Respiratory Rate  18   08/05/19 22:41


 


Blood Pressure  116/50 L  08/05/19 22:41


 


O2 Sat by Pulse Oximetry (%)  95   08/05/19 22:41











Constitutional: Yes: No Distress, Calm


Eyes: Yes: Conjunctiva Clear, EOM Intact


HENT: Yes: Atraumatic, Normocephalic


Neck: Yes: Supple, Trachea Midline


Cardiovascular: Yes: Regular Rate and Rhythm, S1, S2


Gastrointestinal: Yes: Normal Bowel Sounds, Soft


Genitourinary: Yes: Incontinence


Extremities: No: Calf Tenderness


Peripheral Pulses WNL: No


Neurological: Yes: Alert, Oriented


Psychiatric: Yes: Alert, Oriented


Labs: 


 CBC, BMP





 08/04/19 13:08 





 08/04/19 13:08 











Problem List





- Problems


(1) Cellulitis of left leg


Assessment/Plan: 


Ceftriaxone 1 gm iv daily


no change in celllulits 


Code(s): L03.116 - CELLULITIS OF LEFT LOWER LIMB   





(2) Multiple sclerosis not affecting current episode of care


Assessment/Plan: 


Baclofen helped in the past with cramps but it affected and decreased the 

patient's mobility, she would be interested in trying something else


increased Neurontin to 400 mg tid


add Tylenol every 8 hours


MRI esu are pending


Code(s): HSI7371 -    





(3) Left knee pain


Assessment/Plan: 


Tylenol


R/O PAD: arterial doppler


Code(s): M25.562 - PAIN IN LEFT KNEE   





(4) Diabetes mellitus type 2 in obese


Assessment/Plan: 


continue Glipizide


Code(s): E11.69 - TYPE 2 DIABETES MELLITUS WITH OTHER SPECIFIED COMPLICATION; 

E66.9 - OBESITY, UNSPECIFIED   





(5) Hypothyroidism


Assessment/Plan: 


synthroid


Code(s): E03.9 - HYPOTHYROIDISM, UNSPECIFIED   





(6) HTN (hypertension)


Code(s): I10 - ESSENTIAL (PRIMARY) HYPERTENSION   





(7) Breast cancer, left


Assessment/Plan: 


Arimidex 1 mg daily


Code(s): C50.912 - MALIGNANT NEOPLASM OF UNSPECIFIED SITE OF LEFT FEMALE BREAST

## 2019-08-05 NOTE — CON.NEURO
Consult


Consult Specialty:: kenn


Referred by:: usha 





- History of Present Illness


History of Present Illness: 





this is a very pleasant 89-year-old right-handed  woman with multiple 

medical problem including


Coronary artery disease


Non-insulin-dependent diabetes


Hypertension


History of multiple sclerosis with positive family history of 2 sons with 

multiple sclerosis


Patient presents to the hospital with leg plastering patient has been on water 

pill 


I saw  the patient on the floor with a chief complaint of difficulty with the 

left side.  Patient describes increasing difficulty over the past few weeks 

with left leg weakness.  Patient fell and smashed her left leg.  Patient denies 

any loss of consciousness.


Patient was evaluated on the floor patient claims that she had a history of MS 

for many years.  Patient with 2 sons with history of MS.  Patient never been to 

a neurologist in the past few months.





- History Source


History Provided By: Patient, Medical Record


Limitations to Obtaining History: Clinical Condition





- Past Medical History


CNS: Yes: Multiple Sclerosis


Cardio/Vascular: Yes: HTN, Hyperlipdemia.  No: AFIB, CAD, CHF


Gastrointestinal: Yes: Constipation


Renal/: Yes: Neurogenic Bladder


Musculoskeletal: Yes: Osteoarthritis, Other (left lower extremity weakness)


Endocrine: Yes: Diabetes Mellitus





- Alcohol/Substance Use


Hx Alcohol Use: No





- Smoking History


Smoking history: Former smoker


Have you smoked in the past 12 months: No


Aproximately how many cigarettes per day: 0


If you are a former smoker, when did you quit?: 2009





- Social History


History of Recent Travel: No





Home Medications





- Allergies


Allergies/Adverse Reactions: 


 Allergies











Allergy/AdvReac Type Severity Reaction Status Date / Time


 


Penicillins Allergy  Rash Verified 08/04/19 12:35


 


meperidine HCl [From Demerol] AdvReac   Verified 08/04/19 12:35


 


lobster Allergy   Uncoded 08/04/19 12:35














- Home Medications


Home Medications: 


Ambulatory Orders





Simvastatin 10 mg PO HS 01/10/17 


Anastrozole [Arimidex -] 1 mg PO DAILY  tablet 12/06/18 


Budesonide/Formeterol Fumarate [SYMBICORT 160/4.5mcg -] 2 puff IH BID  inhaler 

12/06/18 


Gabapentin [Neurontin -] 300 mg PO TID  capsule 12/06/18 


Levothyroxine [Synthroid -] 150 mcg PO DAILY@0700  tablet 12/06/18 


Losartan 50Mg/Hctz 12.5MG [Hyzaar -] 2 tab PO DAILY  tablet 12/06/18 


Aspirin [ASA -] 81 mg PO DAILY  tab.chew 12/07/18 


Calcium Carbonate/Vitamin D3 [Calcium 500-Vit D3 600 Caplet] 2 each PO DAILY 04/ 04/19 


Cholecalciferol (Vitamin D3) [Vitamin D] 2,000 unit PO DAILY 04/04/19 


Furosemide [Lasix] 20 mg PO DAILY 04/04/19 


Potassium Chloride [Klor-Con M20] 20 meq PO DAILY 04/04/19 











Family Disease History





- Family Disease History


Family Disease History: Heart Disease: Father (fatal MI 63), Other: Son (2 sons 

with multiple sclerosis)





Review of Systems





- Review of Systems


Neurological: reports: Incoordination, Numbness, Parasthesia, Unsteady Gait, 

Weakness





Physical Exam-Neuro


Vital Signs: 


 Vital Signs











Temperature  97.7 F   08/05/19 06:43


 


Pulse Rate  67   08/05/19 06:43


 


Respiratory Rate  18   08/05/19 06:43


 


Blood Pressure  145/48 L  08/05/19 06:43


 


O2 Sat by Pulse Oximetry (%)  95   08/05/19 06:43











Labs: 


 CBC, BMP





 08/04/19 13:08 





 08/04/19 13:08 











- Neuro Exam


Level Of Consciousness: Yes: Oriented to Person, Oriented to Place, Oriented to 

Time


Eyes: Yes: PERRLA


Speech: WNL


Dominant Hand: Right


Cranial Nerves II-XII Intact: Yes


Gag: Present


DTR's: 0 Left Bicep, 0 Right Bicep, 0 Left Brachioradialis, 0 Right 

Brachioradialis


Response to light touch: Normal


Response to pain prick: Normal


Response to temperature: Abnormal


Response to vibration: Abnormal


Motor Strength: 1/5: Left Leg, 3/5: Left Arm, Right Arm, Right Leg


Gait: Deferred





Problem List





- Problems


(1) Multiple sclerosis


Assessment/Plan: 


I doubt there will be any evidence of acute exacerbation of multiple sclerosis


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


Osteoarthritis


Gait dysfunction multifactorial





1.  Fall precautions.


2.  MRI of the brain with no contrast indication is multiple sclerosis


3.  CAT scan of the lumbosacral spine with no contrast indication is 

lumbosacral stenosis.


4.  Physical therapy.


6.  Check B12.


7.  Elastic stockings.








Thank you very much for referring this patient for neurological consultation


Code(s): G35 - MULTIPLE SCLEROSIS

## 2019-08-05 NOTE — CON.ORTH
Consult


Consult Specialty:: Orthopedics





- History of Present Illness


Chief Complaint: Left knee pain


History of Present Illness: 





This is an 89 yo F with PMHx of MS and breast CA who was admitted for IV abx 

treatment of B/L low leg cellulitis. While admitted she mentioned acute on 

chronic pain to left knee causing difficulty with ambulation. Patient notes she 

has experienced left knee pain for several years. She has seen Dr. Vivar in 

office for different problem. Notes intermittent giving out of left knee for 

years, worsening over the past few months. Denies any new injury to this area. 

Patient lives alone and ambulates with walker for stability. Denies any numbness

, tingling to toes or radicular symptoms. 











- History Source


History Provided By: Patient





- Past Medical History


CNS: Yes: Multiple Sclerosis


Cardio/Vascular: Yes: HTN, Hyperlipdemia.  No: AFIB, CAD, CHF


Gastrointestinal: Yes: Constipation


Renal/: Yes: Neurogenic Bladder


Musculoskeletal: Yes: Osteoarthritis, Other (left lower extremity weakness)


Endocrine: Yes: Diabetes Mellitus





- Alcohol/Substance Use


Hx Alcohol Use: No





- Smoking History


Smoking history: Former smoker


Have you smoked in the past 12 months: No


Aproximately how many cigarettes per day: 0


If you are a former smoker, when did you quit?: 2009





- Social History


History of Recent Travel: No





Home Medications





- Allergies


Allergies/Adverse Reactions: 


 Allergies











Allergy/AdvReac Type Severity Reaction Status Date / Time


 


Penicillins Allergy  Rash Verified 08/04/19 12:35


 


meperidine HCl [From Demerol] AdvReac   Verified 08/04/19 12:35


 


lobster Allergy   Uncoded 08/04/19 12:35














- Home Medications


Home Medications: 


Ambulatory Orders





Simvastatin 10 mg PO HS 01/10/17 


Anastrozole [Arimidex -] 1 mg PO DAILY  tablet 12/06/18 


Budesonide/Formeterol Fumarate [SYMBICORT 160/4.5mcg -] 2 puff IH BID  inhaler 

12/06/18 


Gabapentin [Neurontin -] 300 mg PO TID  capsule 12/06/18 


Levothyroxine [Synthroid -] 150 mcg PO DAILY@0700  tablet 12/06/18 


Losartan 50Mg/Hctz 12.5MG [Hyzaar -] 2 tab PO DAILY  tablet 12/06/18 


Aspirin [ASA -] 81 mg PO DAILY  tab.chew 12/07/18 


Calcium Carbonate/Vitamin D3 [Calcium 500-Vit D3 600 Caplet] 2 each PO DAILY 04/ 04/19 


Cholecalciferol (Vitamin D3) [Vitamin D] 2,000 unit PO DAILY 04/04/19 


Furosemide [Lasix] 20 mg PO DAILY 04/04/19 


Potassium Chloride [Klor-Con M20] 20 meq PO DAILY 04/04/19 











Family Disease History





- Family Disease History


Family Disease History: Heart Disease: Father (fatal MI 63), Other: Son (2 sons 

with multiple sclerosis)





Review of Systems





- Review of Systems


Musculoskeletal: reports: Joint Pain (left knee)





Physical Exam for Ortho


Vital Signs: 


 Vital Signs











Temperature  97.7 F   08/05/19 06:43


 


Pulse Rate  67   08/05/19 06:43


 


Respiratory Rate  18   08/05/19 06:43


 


Blood Pressure  145/48 L  08/05/19 06:43


 


O2 Sat by Pulse Oximetry (%)  95   08/05/19 06:43











Labs: 


 CBC, BMP





 08/04/19 13:08 





 08/04/19 13:08 











- Lower Extremity


Knee: Yes: Left (Mild effusion without increased warmth, skin lesions or 

erythema. Tenderness to both medial and lateral joint lines. Decreased ROM due 

to pain. NVID. )





Assessment/Plan





89 yo F with PMHx of MS and breast CA admitted for IV abx treatment of B/L low 

leg cellulitis and acute on chronic left knee pain.


-examination today appears consistent with flare up of chronic osteoarthritis 


-WBAT with assistive devices  


-pain control


-f/u with Dr. Vivar as outpatient

## 2019-08-06 RX ADMIN — NYSTATIN SCH APPLIC: 100000 POWDER TOPICAL at 21:50

## 2019-08-06 RX ADMIN — ANASTROZOLE SCH MG: 1 TABLET, COATED ORAL at 10:13

## 2019-08-06 RX ADMIN — CEFTRIAXONE SCH MLS/HR: 1 INJECTION, SOLUTION INTRAVENOUS at 10:14

## 2019-08-06 RX ADMIN — NYSTATIN SCH APPLIC: 100000 POWDER TOPICAL at 14:41

## 2019-08-06 RX ADMIN — ATORVASTATIN CALCIUM SCH MG: 10 TABLET, FILM COATED ORAL at 21:50

## 2019-08-06 RX ADMIN — FUROSEMIDE SCH MG: 40 TABLET ORAL at 10:13

## 2019-08-06 RX ADMIN — ZINC OXIDE SCH APPLIC: 200 OINTMENT TOPICAL at 10:14

## 2019-08-06 RX ADMIN — GABAPENTIN SCH MG: 400 CAPSULE ORAL at 06:00

## 2019-08-06 RX ADMIN — GABAPENTIN SCH MG: 400 CAPSULE ORAL at 21:50

## 2019-08-06 RX ADMIN — GABAPENTIN SCH MG: 400 CAPSULE ORAL at 14:41

## 2019-08-06 RX ADMIN — LOSARTAN POTASSIUM AND HYDROCHLOROTHIAZIDE TABLETS SCH TAB: 50; 12.5 TABLET, FILM COATED ORAL at 10:13

## 2019-08-06 RX ADMIN — ACETAMINOPHEN PRN MG: 325 TABLET ORAL at 21:00

## 2019-08-06 RX ADMIN — ZINC OXIDE SCH APPLIC: 200 OINTMENT TOPICAL at 21:50

## 2019-08-06 RX ADMIN — GLIPIZIDE SCH MG: 5 TABLET ORAL at 06:00

## 2019-08-06 RX ADMIN — POTASSIUM CHLORIDE SCH MEQ: 20 SOLUTION ORAL at 10:13

## 2019-08-06 RX ADMIN — LEVOTHYROXINE SODIUM SCH MCG: 112 TABLET ORAL at 06:00

## 2019-08-06 RX ADMIN — ENOXAPARIN SODIUM SCH MG: 30 INJECTION SUBCUTANEOUS at 10:14

## 2019-08-06 NOTE — PN
Progress Note, Physician


Chief Complaint: 





cellulitis of lower extrewmities, weakness in lower extremities and muscle 

cramps, left knee instability/weakness


History of Present Illness: 





PAteint admitted for treatment of cellulitis of the lower extremities and 

weakness in the lower extremities





- Current Medication List


Current Medications: 


Active Medications





Acetaminophen (Tylenol -)  650 mg PO Q6H PRN


   PRN Reason: MODERATE PAIN (4-7)


Anastrozole (Arimidex -)  1 mg PO DAILY [facility]


   Last Admin: 08/06/19 10:13 Dose:  1 mg


Atorvastatin Calcium (Lipitor -)  10 mg PO HS [facility]


   Last Admin: 08/06/19 21:50 Dose:  10 mg


Enoxaparin Sodium (Lovenox -)  30 mg SQ DAILY [facility]


   Last Admin: 08/06/19 10:14 Dose:  30 mg


Furosemide (Lasix -)  40 mg PO DAILY [facility]


   Last Admin: 08/06/19 10:13 Dose:  40 mg


Gabapentin (Neurontin -)  400 mg PO TID [facility]


   Last Admin: 08/06/19 21:50 Dose:  400 mg


Glipizide (Glucotrol -)  2.5 mg PO DAILY@0700 [facility]


   Last Admin: 08/06/19 06:00 Dose:  2.5 mg


HCTZ/Losartan Potassium (Hyzaar -)  2 tab PO DAILY [facility]


   Last Admin: 08/06/19 10:13 Dose:  2 tab


Ceftriaxone Sodium (Ceftriaxone 1 Gm-D5w Bag)  50 mls @ 100 mls/hr IVPB DAILY 

[facility]; Protocol


   Last Admin: 08/06/19 10:14 Dose:  100 mls/hr


Levothyroxine Sodium (Synthroid -)  112 mcg PO DAILY@0700 [facility]


   Last Admin: 08/06/19 06:00 Dose:  112 mcg


Multi-Ingredient Ointment (Zinc Oxide)  1 applic TP BID [facility]


   Last Admin: 08/06/19 21:50 Dose:  1 applic


Nystatin (Nystop Powder -)  1 applic TP TID [facility]


   Last Admin: 08/06/19 21:50 Dose:  1 applic


Potassium Chloride (Potassium Chloride Oral Liquid)  20 meq PO DAILY [facility]


   Last Admin: 08/06/19 10:13 Dose:  20 meq











- Objective


Vital Signs: 


 Vital Signs











Temperature  98.7 F   08/06/19 22:40


 


Pulse Rate  81   08/06/19 22:40


 


Respiratory Rate  18   08/06/19 22:40


 


Blood Pressure  104/70   08/06/19 22:40


 


O2 Sat by Pulse Oximetry (%)  94 L  08/06/19 22:40











Labs: 


 CBC, BMP





 08/04/19 13:08 





 08/04/19 13:08 











Problem List





- Problems


(1) Cellulitis of left leg


Assessment/Plan: 


Ceftriaxone 1 gm iv daily


no change in celllulits 


Code(s): L03.116 - CELLULITIS OF LEFT LOWER LIMB   





(2) Multiple sclerosis not affecting current episode of care


Assessment/Plan: 


Baclofen helped in the past with cramps but it affected and decreased the 

patient's mobility, she would be interested in trying something else


increased Neurontin to 400 mg tid


add Tylenol every 8 hours


MRI esu are pending


Code(s): [id number] -    





(3) Left knee pain


Assessment/Plan: 


Tylenol


R/O PAD: arterial doppler


Code(s): M25.562 - PAIN IN LEFT KNEE   





(4) Diabetes mellitus type 2 in obese


Assessment/Plan: 


continue Glipizide


Code(s): E11.69 - TYPE 2 DIABETES MELLITUS WITH OTHER SPECIFIED COMPLICATION; 

E66.9 - OBESITY, UNSPECIFIED   





(5) Hypothyroidism


Assessment/Plan: 


synthroid


Code(s): E03.9 - HYPOTHYROIDISM, UNSPECIFIED   





(6) HTN (hypertension)


Code(s): I10 - ESSENTIAL (PRIMARY) HYPERTENSION   





(7) Breast cancer, left


Code(s): C50.912 - MALIGNANT NEOPLASM OF UNSPECIFIED SITE OF LEFT FEMALE BREAST

## 2019-08-07 RX ADMIN — POTASSIUM CHLORIDE SCH MEQ: 20 SOLUTION ORAL at 10:08

## 2019-08-07 RX ADMIN — CEFTRIAXONE SCH MLS/HR: 1 INJECTION, SOLUTION INTRAVENOUS at 10:06

## 2019-08-07 RX ADMIN — LOSARTAN POTASSIUM AND HYDROCHLOROTHIAZIDE TABLETS SCH TAB: 50; 12.5 TABLET, FILM COATED ORAL at 10:07

## 2019-08-07 RX ADMIN — GLIPIZIDE SCH MG: 5 TABLET ORAL at 06:56

## 2019-08-07 RX ADMIN — ENOXAPARIN SODIUM SCH MG: 30 INJECTION SUBCUTANEOUS at 10:09

## 2019-08-07 RX ADMIN — GABAPENTIN SCH MG: 400 CAPSULE ORAL at 15:22

## 2019-08-07 RX ADMIN — NYSTATIN SCH APPLIC: 100000 POWDER TOPICAL at 21:52

## 2019-08-07 RX ADMIN — ANASTROZOLE SCH MG: 1 TABLET, COATED ORAL at 10:06

## 2019-08-07 RX ADMIN — ZINC OXIDE SCH APPLIC: 200 OINTMENT TOPICAL at 21:51

## 2019-08-07 RX ADMIN — GABAPENTIN SCH MG: 400 CAPSULE ORAL at 06:55

## 2019-08-07 RX ADMIN — ZINC OXIDE SCH APPLIC: 200 OINTMENT TOPICAL at 10:09

## 2019-08-07 RX ADMIN — NYSTATIN SCH APPLIC: 100000 POWDER TOPICAL at 15:22

## 2019-08-07 RX ADMIN — GABAPENTIN SCH MG: 400 CAPSULE ORAL at 21:51

## 2019-08-07 RX ADMIN — ATORVASTATIN CALCIUM SCH MG: 10 TABLET, FILM COATED ORAL at 21:50

## 2019-08-07 RX ADMIN — NYSTATIN SCH APPLIC: 100000 POWDER TOPICAL at 06:55

## 2019-08-07 RX ADMIN — LEVOTHYROXINE SODIUM SCH MCG: 112 TABLET ORAL at 06:56

## 2019-08-07 RX ADMIN — FUROSEMIDE SCH MG: 40 TABLET ORAL at 10:07

## 2019-08-07 NOTE — PN
Progress Note, Physician


Chief Complaint: 





cellulitis of lower extrewmities, weakness in lower extremities and muscle 

cramps, left knee instability/weakness


History of Present Illness: 





PAtient admitted  for iv treatment of cellulitis that failed initial oral 

antibiotics administration. Patient is also complaining of pain and weakness  

in the lower extremities which manifest especially at night when in bed. Keping 

the legs dangling at the margin of rashad bed helps the pain.





- Current Medication List


Current Medications: 


Active Medications





Acetaminophen (Tylenol -)  650 mg PO Q6H PRN


   PRN Reason: MODERATE PAIN (4-7)


   Last Admin: 08/06/19 21:00 Dose:  650 mg


Anastrozole (Arimidex -)  1 mg PO DAILY [facility]


   Last Admin: 08/07/19 10:06 Dose:  1 mg


Atorvastatin Calcium (Lipitor -)  10 mg PO HS [facility]


   Last Admin: 08/07/19 21:50 Dose:  10 mg


Enoxaparin Sodium (Lovenox -)  30 mg SQ DAILY [facility]


   Last Admin: 08/07/19 10:09 Dose:  30 mg


Furosemide (Lasix -)  40 mg PO DAILY [facility]


   Last Admin: 08/07/19 10:07 Dose:  40 mg


Gabapentin (Neurontin -)  400 mg PO TID [facility]


   Last Admin: 08/07/19 21:51 Dose:  400 mg


Glipizide (Glucotrol -)  2.5 mg PO DAILY@0700 [facility]


   Last Admin: 08/07/19 06:56 Dose:  2.5 mg


HCTZ/Losartan Potassium (Hyzaar -)  2 tab PO DAILY [facility]


   Last Admin: 08/07/19 10:07 Dose:  2 tab


Ceftriaxone Sodium (Ceftriaxone 1 Gm-D5w Bag)  50 mls @ 100 mls/hr IVPB DAILY 

[facility]; Protocol


   Last Admin: 08/07/19 10:06 Dose:  100 mls/hr


Levothyroxine Sodium (Synthroid -)  112 mcg PO DAILY@0700 [facility]


   Last Admin: 08/07/19 06:56 Dose:  112 mcg


Multi-Ingredient Ointment (Zinc Oxide)  1 applic TP BID [facility]


   Last Admin: 08/07/19 21:51 Dose:  1 applic


Nystatin (Nystop Powder -)  1 applic TP TID [facility]


   Last Admin: 08/07/19 21:52 Dose:  1 applic


Potassium Chloride (Potassium Chloride Oral Liquid)  20 meq PO DAILY [facility]


   Last Admin: 08/07/19 10:08 Dose:  20 meq











- Objective


Vital Signs: 


 Vital Signs











Temperature  97.9 F   08/07/19 14:38


 


Pulse Rate  78   08/07/19 14:38


 


Respiratory Rate  19   08/07/19 14:38


 


Blood Pressure  101/36 L  08/07/19 14:38


 


O2 Sat by Pulse Oximetry (%)  95   08/07/19 14:38











Constitutional: Yes: No Distress, Calm


Eyes: Yes: Conjunctiva Clear, EOM Intact


HENT: Yes: Atraumatic, Normocephalic


Neck: Yes: Supple, Trachea Midline


Cardiovascular: Yes: Regular Rate and Rhythm, S1, S2


Respiratory: Yes: Regular, CTA Bilaterally.  No: Cough, Orthopnea, SOB on 

Exertion, Wheezes


Labs: 


 CBC, BMP





 08/04/19 13:08 





 08/04/19 13:08 











Problem List





- Problems


(1) Cellulitis of left leg


Assessment/Plan: 


Ceftriaxone 1 gm iv daily


no change in celllulits 


Code(s): L03.116 - CELLULITIS OF LEFT LOWER LIMB   





(2) Multiple sclerosis not affecting current episode of care


Assessment/Plan: 


Baclofen helped in the past with cramps but it affected and decreased the 

patient's mobility, she would be interested in trying something else


increased Neurontin to 400 mg tid


add Tylenol every 8 hours


MRI esu are pending


Code(s): [id number] -    





(3) Left knee pain


Code(s): M25.562 - PAIN IN LEFT KNEE   





(4) Diabetes mellitus type 2 in obese


Code(s): E11.69 - TYPE 2 DIABETES MELLITUS WITH OTHER SPECIFIED COMPLICATION; 

E66.9 - OBESITY, UNSPECIFIED   





(5) Hypothyroidism


Code(s): E03.9 - HYPOTHYROIDISM, UNSPECIFIED   





(6) HTN (hypertension)


Code(s): I10 - ESSENTIAL (PRIMARY) HYPERTENSION   





(7) Breast cancer, left


Code(s): C50.912 - MALIGNANT NEOPLASM OF UNSPECIFIED SITE OF LEFT FEMALE BREAST

## 2019-08-08 RX ADMIN — LOSARTAN POTASSIUM AND HYDROCHLOROTHIAZIDE TABLETS SCH TAB: 50; 12.5 TABLET, FILM COATED ORAL at 09:51

## 2019-08-08 RX ADMIN — FUROSEMIDE SCH MG: 40 TABLET ORAL at 09:51

## 2019-08-08 RX ADMIN — GABAPENTIN SCH MG: 400 CAPSULE ORAL at 21:24

## 2019-08-08 RX ADMIN — ENOXAPARIN SODIUM SCH MG: 30 INJECTION SUBCUTANEOUS at 09:52

## 2019-08-08 RX ADMIN — GABAPENTIN SCH MG: 400 CAPSULE ORAL at 05:55

## 2019-08-08 RX ADMIN — ACETAMINOPHEN PRN MG: 325 TABLET ORAL at 01:13

## 2019-08-08 RX ADMIN — ANASTROZOLE SCH MG: 1 TABLET, COATED ORAL at 09:51

## 2019-08-08 RX ADMIN — ACETAMINOPHEN PRN MG: 325 TABLET ORAL at 19:38

## 2019-08-08 RX ADMIN — POTASSIUM CHLORIDE SCH MEQ: 20 SOLUTION ORAL at 09:52

## 2019-08-08 RX ADMIN — NYSTATIN SCH APPLIC: 100000 POWDER TOPICAL at 05:55

## 2019-08-08 RX ADMIN — CEFTRIAXONE SCH MLS/HR: 1 INJECTION, SOLUTION INTRAVENOUS at 09:49

## 2019-08-08 RX ADMIN — ATORVASTATIN CALCIUM SCH MG: 10 TABLET, FILM COATED ORAL at 21:24

## 2019-08-08 RX ADMIN — GABAPENTIN SCH MG: 400 CAPSULE ORAL at 14:37

## 2019-08-08 RX ADMIN — GLIPIZIDE SCH MG: 5 TABLET ORAL at 06:00

## 2019-08-08 RX ADMIN — ZINC OXIDE SCH APPLIC: 200 OINTMENT TOPICAL at 21:25

## 2019-08-08 RX ADMIN — ZINC OXIDE SCH APPLIC: 200 OINTMENT TOPICAL at 09:52

## 2019-08-08 RX ADMIN — LEVOTHYROXINE SODIUM SCH MCG: 112 TABLET ORAL at 06:13

## 2019-08-08 RX ADMIN — NYSTATIN SCH APPLIC: 100000 POWDER TOPICAL at 21:25

## 2019-08-08 NOTE — PN
Progress Note, Physician


Chief Complaint: 


feels better, minimal cramping in the lower extremities at rest


History of Present Illness: 





PAtient admitted  for iv treatment of cellulitis that failed initial oral 

antibiotics administration. Patient is also complaining of pain and weakness  

in the lower extremities which manifest especially at night when in bed.





- Current Medication List


Current Medications: 


Active Medications





Acetaminophen (Tylenol -)  650 mg PO Q6H PRN


   PRN Reason: MODERATE PAIN (4-7)


   Last Admin: 08/08/19 19:38 Dose:  650 mg


Anastrozole (Arimidex -)  1 mg PO DAILY [facility]


   Last Admin: 08/08/19 09:51 Dose:  1 mg


Atorvastatin Calcium (Lipitor -)  10 mg PO HS [facility]


   Last Admin: 08/08/19 21:24 Dose:  10 mg


Enoxaparin Sodium (Lovenox -)  30 mg SQ DAILY [facility]


   Last Admin: 08/08/19 09:52 Dose:  30 mg


Furosemide (Lasix -)  40 mg PO DAILY [facility]


   Last Admin: 08/08/19 09:51 Dose:  40 mg


Gabapentin (Neurontin -)  400 mg PO TID [facility]


   Last Admin: 08/08/19 21:24 Dose:  400 mg


Glipizide (Glucotrol -)  2.5 mg PO DAILY@0700 [facility]


   Last Admin: 08/08/19 06:00 Dose:  2.5 mg


HCTZ/Losartan Potassium (Hyzaar -)  2 tab PO DAILY [facility]


   Last Admin: 08/08/19 09:51 Dose:  2 tab


Ceftriaxone Sodium (Ceftriaxone 1 Gm-D5w Bag)  50 mls @ 100 mls/hr IVPB DAILY 

[facility]; Protocol


   Last Admin: 08/08/19 09:49 Dose:  100 mls/hr


Levothyroxine Sodium (Synthroid -)  112 mcg PO DAILY@0700 [facility]


   Last Admin: 08/08/19 06:13 Dose:  112 mcg


Multi-Ingredient Ointment (Zinc Oxide)  1 applic TP BID [facility]


   Last Admin: 08/08/19 21:25 Dose:  1 applic


Nystatin (Nystop Powder -)  1 applic TP TID [facility]


   Last Admin: 08/08/19 21:25 Dose:  1 applic


Potassium Chloride (Potassium Chloride Oral Liquid)  20 meq PO DAILY [facility]


   Last Admin: 08/08/19 09:52 Dose:  20 meq











- Objective


Vital Signs: 


 Vital Signs











Temperature  97.7 F   08/08/19 14:00


 


Pulse Rate  78   08/08/19 14:00


 


Respiratory Rate  18   08/08/19 14:00


 


Blood Pressure  112/47 L  08/08/19 14:00


 


O2 Sat by Pulse Oximetry (%)  96   08/08/19 14:00











Constitutional: Yes: No Distress, Calm


Eyes: Yes: Conjunctiva Clear, EOM Intact


HENT: Yes: Atraumatic, Normocephalic


Neck: Yes: Supple, Trachea Midline


Cardiovascular: Yes: Regular Rate and Rhythm, S1, S2


Respiratory: Yes: Regular, CTA Bilaterally


Gastrointestinal: Yes: Normal Bowel Sounds, Soft


Genitourinary: Yes: Incontinence


Musculoskeletal: Yes: Back Pain, Joint Stiffness, Muscle Weakness


Extremities: No: Calf Tenderness


Edema: Yes


Edema: LLE: 2+, RLE: 2+


Peripheral Pulses WNL: Yes


Neurological: Yes: Alert, Oriented


Psychiatric: Yes: Alert, Oriented


Labs: 


 CBC, BMP





 08/04/19 13:08 





 08/04/19 13:08 











Problem List





- Problems


(1) Cellulitis of left leg


Assessment/Plan: 


Ceftriaxone 1 gm iv daily


improvement noticed in erythema and edema 


discharge planning in am


Code(s): L03.116 - CELLULITIS OF LEFT LOWER LIMB   





(2) Multiple sclerosis not affecting current episode of care


Assessment/Plan: 





increased Neurontin to 400 mg tid


add Tylenol every 8 hours


MRI no acute changes


Code(s): AHG1615 -    





(3) Left knee pain


Assessment/Plan: 


Tylenol


R/O PAD: arterial doppler


Code(s): M25.562 - PAIN IN LEFT KNEE   





(4) Diabetes mellitus type 2 in obese


Assessment/Plan: 


continue Glipizide


Code(s): E11.69 - TYPE 2 DIABETES MELLITUS WITH OTHER SPECIFIED COMPLICATION; 

E66.9 - OBESITY, UNSPECIFIED   





(5) Hypothyroidism


Assessment/Plan: 


synthroid


Code(s): E03.9 - HYPOTHYROIDISM, UNSPECIFIED   





(6) HTN (hypertension)


Assessment/Plan: 


continue Valsartan /HCTZ


Code(s): I10 - ESSENTIAL (PRIMARY) HYPERTENSION   





(7) Breast cancer, left


Assessment/Plan: 


Arimidex 1 mg daily


Code(s): C50.912 - MALIGNANT NEOPLASM OF UNSPECIFIED SITE OF LEFT FEMALE BREAST

## 2019-08-09 VITALS — SYSTOLIC BLOOD PRESSURE: 159 MMHG | DIASTOLIC BLOOD PRESSURE: 62 MMHG | HEART RATE: 77 BPM | TEMPERATURE: 98.8 F

## 2019-08-09 RX ADMIN — ENOXAPARIN SODIUM SCH MG: 30 INJECTION SUBCUTANEOUS at 10:13

## 2019-08-09 RX ADMIN — CEFTRIAXONE SCH: 1 INJECTION, SOLUTION INTRAVENOUS at 10:20

## 2019-08-09 RX ADMIN — NYSTATIN SCH APPLIC: 100000 POWDER TOPICAL at 14:26

## 2019-08-09 RX ADMIN — ZINC OXIDE SCH APPLIC: 200 OINTMENT TOPICAL at 10:14

## 2019-08-09 RX ADMIN — CEFTRIAXONE SCH MLS/HR: 1 INJECTION, SOLUTION INTRAVENOUS at 10:13

## 2019-08-09 RX ADMIN — LOSARTAN POTASSIUM AND HYDROCHLOROTHIAZIDE TABLETS SCH TAB: 50; 12.5 TABLET, FILM COATED ORAL at 10:13

## 2019-08-09 RX ADMIN — NYSTATIN SCH APPLIC: 100000 POWDER TOPICAL at 06:15

## 2019-08-09 RX ADMIN — GLIPIZIDE SCH MG: 5 TABLET ORAL at 06:15

## 2019-08-09 RX ADMIN — GABAPENTIN SCH MG: 400 CAPSULE ORAL at 06:12

## 2019-08-09 RX ADMIN — POTASSIUM CHLORIDE SCH MEQ: 20 SOLUTION ORAL at 10:13

## 2019-08-09 RX ADMIN — ANASTROZOLE SCH MG: 1 TABLET, COATED ORAL at 10:14

## 2019-08-09 RX ADMIN — FUROSEMIDE SCH MG: 40 TABLET ORAL at 10:14

## 2019-08-09 RX ADMIN — GABAPENTIN SCH MG: 400 CAPSULE ORAL at 14:25

## 2019-08-09 RX ADMIN — LEVOTHYROXINE SODIUM SCH MCG: 112 TABLET ORAL at 06:15

## 2019-08-09 NOTE — DS
Physical Examination


Vital Signs: 


 Vital Signs











Temperature  98.8 F   08/09/19 13:56


 


Pulse Rate  77   08/09/19 13:56


 


Respiratory Rate  20   08/09/19 13:56


 


Blood Pressure  159/62   08/09/19 13:56


 


O2 Sat by Pulse Oximetry (%)  99   08/09/19 13:56











Constitutional: Yes: No Distress, Calm


Eyes: Yes: Conjunctiva Clear, EOM Intact


HENT: Yes: Atraumatic, Normocephalic


Neck: Yes: Supple, Trachea Midline


Cardiovascular: Yes: Regular Rate and Rhythm, S1, S2


Respiratory: Yes: Regular, CTA Bilaterally


Gastrointestinal: Yes: Normal Bowel Sounds, Soft, Abdomen, Obese.  No: 

Hepatomegaly, Splenomegaly


Musculoskeletal: Yes: Joint Stiffness, Other (left knee pain)


Extremities: No: Calf Tenderness


Edema: LLE: 2+, RLE: 2+


Integumentary: Yes: Erythema


Neurological: Yes: Alert, Oriented


Psychiatric: Yes: Alert, Oriented


Labs: 


 CBC, BMP





 08/04/19 13:08 





 08/04/19 13:08 











Discharge Summary


Reason For Visit: CELLULITIS


Current Active Problems





Hypothyroidism (Acute)


Left knee pain (Acute)


Osteoarthritis of left knee (Acute)








Hospital Course: 





89 yo female admitted for cellulitis, of lower extremities, and edema. Patient 

received iv antibiotics and improved . Now the patient is being discharged on 

oral antibiotics wit hinstructions to follow up with me in the office.


Condition: Stable





- Instructions


Referrals: 


Melania Lemons MD [Primary Care Provider] - 





- Home Medications


Comprehensive Discharge Medication List: 


Ambulatory Orders





Simvastatin 10 mg PO HS 01/10/17 


Anastrozole [Arimidex -] 1 mg PO DAILY  tablet 12/06/18 


Budesonide/Formeterol Fumarate [SYMBICORT 160/4.5mcg -] 2 puff IH BID  inhaler 

12/06/18 


Gabapentin [Neurontin -] 300 mg PO TID  capsule 12/06/18 


Levothyroxine [Synthroid -] 150 mcg PO DAILY@0700  tablet 12/06/18 


Losartan 50Mg/Hctz 12.5MG [Hyzaar -] 2 tab PO DAILY  tablet 12/06/18 


Aspirin [ASA -] 81 mg PO DAILY  tab.chew 12/07/18 


Calcium Carbonate/Vitamin D3 [Calcium 500-Vit D3 600 Caplet] 2 each PO DAILY 04/ 04/19 


Cholecalciferol (Vitamin D3) [Vitamin D] 2,000 unit PO DAILY 04/04/19 


Furosemide [Lasix] 20 mg PO DAILY 04/04/19 


Potassium Chloride [Klor-Con M20] 20 meq PO DAILY 04/04/19

## 2019-09-03 ENCOUNTER — RESULT REVIEW (OUTPATIENT)
Age: 84
End: 2019-09-03

## 2019-09-10 ENCOUNTER — RESULT REVIEW (OUTPATIENT)
Age: 84
End: 2019-09-10

## 2019-09-17 ENCOUNTER — RESULT REVIEW (OUTPATIENT)
Age: 84
End: 2019-09-17

## 2019-10-08 ENCOUNTER — RESULT REVIEW (OUTPATIENT)
Age: 84
End: 2019-10-08

## 2019-10-15 ENCOUNTER — RESULT REVIEW (OUTPATIENT)
Age: 84
End: 2019-10-15

## 2020-01-26 ENCOUNTER — HOSPITAL ENCOUNTER (INPATIENT)
Dept: HOSPITAL 74 - FER | Age: 85
LOS: 12 days | Discharge: NURSING, REHAB OR LONG-TERM CARE | DRG: 871 | End: 2020-02-07
Attending: INTERNAL MEDICINE | Admitting: INTERNAL MEDICINE
Payer: COMMERCIAL

## 2020-01-26 VITALS — BODY MASS INDEX: 40.2 KG/M2

## 2020-01-26 DIAGNOSIS — G35: ICD-10-CM

## 2020-01-26 DIAGNOSIS — E66.9: ICD-10-CM

## 2020-01-26 DIAGNOSIS — E11.21: ICD-10-CM

## 2020-01-26 DIAGNOSIS — L03.116: ICD-10-CM

## 2020-01-26 DIAGNOSIS — I13.0: ICD-10-CM

## 2020-01-26 DIAGNOSIS — A41.9: Primary | ICD-10-CM

## 2020-01-26 DIAGNOSIS — R50.9: ICD-10-CM

## 2020-01-26 DIAGNOSIS — N39.0: ICD-10-CM

## 2020-01-26 DIAGNOSIS — E87.70: ICD-10-CM

## 2020-01-26 DIAGNOSIS — E03.9: ICD-10-CM

## 2020-01-26 DIAGNOSIS — E87.2: ICD-10-CM

## 2020-01-26 DIAGNOSIS — J18.9: ICD-10-CM

## 2020-01-26 DIAGNOSIS — N18.3: ICD-10-CM

## 2020-01-26 DIAGNOSIS — N17.9: ICD-10-CM

## 2020-01-26 DIAGNOSIS — J44.0: ICD-10-CM

## 2020-01-26 DIAGNOSIS — I73.9: ICD-10-CM

## 2020-01-26 DIAGNOSIS — J44.1: ICD-10-CM

## 2020-01-26 DIAGNOSIS — E78.5: ICD-10-CM

## 2020-01-26 DIAGNOSIS — J81.1: ICD-10-CM

## 2020-01-26 DIAGNOSIS — D64.9: ICD-10-CM

## 2020-01-26 DIAGNOSIS — I50.31: ICD-10-CM

## 2020-01-26 DIAGNOSIS — J06.9: ICD-10-CM

## 2020-01-26 DIAGNOSIS — I47.1: ICD-10-CM

## 2020-01-26 LAB
ALBUMIN SERPL-MCNC: 3.3 G/DL (ref 3.4–5)
ALP SERPL-CCNC: 93 U/L (ref 45–117)
ALT SERPL-CCNC: 15 U/L (ref 13–61)
ANION GAP SERPL CALC-SCNC: 15 MMOL/L (ref 8–16)
AST SERPL-CCNC: 39 U/L (ref 15–37)
BASOPHILS # BLD: 0.3 % (ref 0–2)
BILIRUB SERPL-MCNC: 0.7 MG/DL (ref 0.2–1)
BUN SERPL-MCNC: 81 MG/DL (ref 7–18)
CALCIUM SERPL-MCNC: 8.2 MG/DL (ref 8.5–10)
CHLORIDE SERPL-SCNC: 101 MMOL/L (ref 98–107)
CO2 SERPL-SCNC: 26 MMOL/L (ref 21–32)
CREAT SERPL-MCNC: 2.3 MG/DL (ref 0.55–1.3)
DEPRECATED RDW RBC AUTO: 14.9 % (ref 11.6–15.6)
EOSINOPHIL # BLD: 1.5 % (ref 0–4.5)
EPITH CASTS URNS QL MICRO: (no result) /HPF
GLUCOSE SERPL-MCNC: 106 MG/DL (ref 74–106)
HCT VFR BLD CALC: 35.3 % (ref 32.4–45.2)
HGB BLD-MCNC: 11.7 GM/DL (ref 10.7–15.3)
LYMPHOCYTES # BLD: 18.4 % (ref 8–40)
MCH RBC QN AUTO: 30 PG (ref 25.7–33.7)
MCHC RBC AUTO-ENTMCNC: 33.1 G/DL (ref 32–36)
MCV RBC: 90.6 FL (ref 80–96)
MONOCYTES # BLD AUTO: 6.3 % (ref 3.8–10.2)
NEUTROPHILS # BLD: 73.5 % (ref 42.8–82.8)
PLATELET # BLD AUTO: 256 K/MM3 (ref 134–434)
PMV BLD: 10.2 FL (ref 7.5–11.1)
POTASSIUM SERPLBLD-SCNC: 4.1 MMOL/L (ref 3.5–5.1)
PROT SERPL-MCNC: 6.8 G/DL (ref 6.4–8.2)
RBC # BLD AUTO: 3.89 M/MM3 (ref 3.6–5.2)
SODIUM SERPL-SCNC: 142 MMOL/L (ref 136–145)
WBC # BLD AUTO: 12.2 K/MM3 (ref 4–10.8)

## 2020-01-26 PROCEDURE — A9502 TC99M TETROFOSMIN: HCPCS

## 2020-01-26 RX ADMIN — NYSTATIN SCH APPLIC: 100000 POWDER TOPICAL at 21:57

## 2020-01-26 RX ADMIN — IPRATROPIUM BROMIDE AND ALBUTEROL SULFATE SCH AMP: .5; 3 SOLUTION RESPIRATORY (INHALATION) at 12:05

## 2020-01-26 RX ADMIN — IPRATROPIUM BROMIDE AND ALBUTEROL SULFATE SCH AMP: .5; 3 SOLUTION RESPIRATORY (INHALATION) at 11:40

## 2020-01-26 RX ADMIN — ZINC OXIDE SCH APPLIC: 200 OINTMENT TOPICAL at 21:58

## 2020-01-26 RX ADMIN — ATORVASTATIN CALCIUM SCH MG: 10 TABLET, FILM COATED ORAL at 21:56

## 2020-01-26 RX ADMIN — IPRATROPIUM BROMIDE AND ALBUTEROL SULFATE SCH AMP: .5; 3 SOLUTION RESPIRATORY (INHALATION) at 11:25

## 2020-01-26 RX ADMIN — GABAPENTIN SCH MG: 300 CAPSULE ORAL at 21:56

## 2020-01-26 RX ADMIN — GUAIFENESIN AND DEXTROMETHORPHAN HYDROBROMIDE SCH ML: 100; 10 SOLUTION ORAL at 21:55

## 2020-01-26 RX ADMIN — IPRATROPIUM BROMIDE AND ALBUTEROL SULFATE SCH AMP: .5; 3 SOLUTION RESPIRATORY (INHALATION) at 11:10

## 2020-01-26 NOTE — HP
Admitting History and Physical





- Primary Care Physician


PCP: Fox Zaragoza





- Admission


Chief Complaint: cough, weakness


History of Present Illness: 





Pt with weakness and cough for one day, got worse this AM and went to [facility] ER; in ER pt had fever, found to have  tachycardia, be in ARF


History Source: Patient





- Past Medical History


CNS: Yes: Multiple Sclerosis (with left leg decreased proprioception)


Cardiovascular: Yes: HTN, Hyperlipdemia.  No: AFIB, CAD, CHF


Gastrointestinal: Yes: Constipation (BM 3 per week)


Renal/: Yes: Neurogenic Bladder


...Pregnant: No


Heme/Onc: Yes: Cancer (left breast cancer)


Musculoskeletal: Yes: Osteoarthritis, Other (left lower extremity weakness)


Endocrine: Yes: Diabetes Mellitus





- Smoking History


Smoking history: Former smoker


Have you smoked in the past 12 months: No


Aproximately how many cigarettes per day: 0


If you are a former smoker, when did you quit?: 2009





- Alcohol/Substance Use


Hx Alcohol Use: No





- Social History


History of Recent Travel: No





Home Medications





- Allergies


Allergies/Adverse Reactions: 


 Allergies











Allergy/AdvReac Type Severity Reaction Status Date / Time


 


Penicillins Allergy  Rash Verified 08/04/19 12:35


 


meperidine HCl [From Demerol] AdvReac   Verified 08/04/19 12:35


 


lobster Allergy   Uncoded 08/04/19 12:35














- Home Medications


Home Medications: 


Ambulatory Orders





Simvastatin 10 mg PO HS 01/10/17 


Budesonide/Formeterol Fumarate [SYMBICORT 160/4.5mcg -] 2 puff IH BID  inhaler 

12/06/18 


Aspirin [ASA -] 81 mg PO DAILY  tab.chew 12/07/18 


Calcium Carbonate/Vitamin D3 [Calcium 500-Vit D3 600 Caplet] 2 each PO DAILY 04/ 04/19 


Cholecalciferol (Vitamin D3) [Vitamin D3] 2,000 unit PO DAILY 04/04/19 


Potassium Chloride [Klor-Con M20] 20 meq PO DAILY 04/04/19 


Acetaminophen [Tylenol .Regular Strength -] 650 mg PO Q6H PRN  tablet 08/09/19 


Anastrozole [Arimidex -] 1 mg PO DAILY  tablet 08/09/19 


Losartan 50Mg/Hctz 12.5MG [Hyzaar -] 2 tab PO DAILY  tablet 08/09/19 


Nystatin Powder [Nystop Powder -] 1 applic TP TID 10 Days #1 applic 08/09/19 


Zinc Oxide 1 applic TP BID #1 tube 08/09/19 


Cilostazol [Pletal -] 100 mg PO BID 01/26/20 


Furosemide [Lasix] 20 mg PO DAILY 01/26/20 


Gabapentin [Neurontin] 300 mg PO TID 01/26/20 


Levothyroxine [Synthroid -] 150 mcg PO DAILY 01/26/20 











Review of Systems





- Review of Systems


Constitutional: denies: Chills, Fever


Eyes: denies: Blurred Vision, Double Vision


HENT: denies: Ear Discharge, Nasal Congestion, Throat Pain


Neck: denies: Pain on Movement, Stiffness


Cardiovascular: denies: Chest Pain, Edema, Palpitations


Respiratory: reports: Cough, Wheezing.  denies: SOB


Gastrointestinal: denies: Abdominal Pain, Diarrhea, Nausea, Vomiting


Genitourinary: denies: Burning, Discharge, Flank Pain


Musculoskeletal: denies: Back Pain, Joint Swelling, Muscle Pain


Integumentary: denies: Blister, Bruising


Neurological: denies: Change in LOC, Change in Speech, Numbness, Tremors


Endocrine: denies: Excessive Sweating, Intolerance to Cold


Hematology/Lymphatic: denies: Easily Bruised, Excessive Bleeding


Psychiatric: denies: Anxiety, Depression





Physical Examination


Vital Signs: 


 Vital Signs











Temperature  98.3 F   01/26/20 18:55


 


Pulse Rate  97 H  01/26/20 18:55


 


Respiratory Rate  20   01/26/20 18:55


 


Blood Pressure  99/43 L  01/26/20 18:55


 


O2 Sat by Pulse Oximetry (%)  94 L  01/26/20 16:00











Constitutional: Yes: No Distress, Calm


Eyes: Yes: Conjunctiva Clear, EOM Intact.  No: Sclera Icterus


HENT: No: Epistaxis, Rhinnorhea


Neck: Yes: Trachea Midline.  No: Lymphadenopathy


Cardiovascular: Yes: Regular Rate and Rhythm, S1, S2


Respiratory: Yes: Regular, Rhonchi, Wheezes


Gastrointestinal: Yes: Normal Bowel Sounds, Soft.  No: Palpable Mass, Tenderness


...Rectal Exam: Yes: Deferred


Renal/: No: CVA Tenderness - Left, CVA Tenderness - Right


Breast(s): Yes: Other (deferred)


Extremities: Yes: Other (chronic venous changes)


Edema: Yes


Edema: LLE: 1+, RLE: 1+


Integumentary: Yes: Venous Stasis Changes.  No: Jaundice


Neurological: Yes: Alert, Oriented, Other (motor and sensory is grossly ontact 

in UE/ LE/face.)


Psychiatric: Yes: Alert, Oriented


Labs: 


 CBC, BMP





 01/26/20 12:15 





 01/26/20 12:15 











Imaging





- Results


Chest X-ray: Report Reviewed





Problem List





- Problems


(1) COPD with acute exacerbation


Code(s): J44.1 - CHRONIC OBSTRUCTIVE PULMONARY DISEASE W (ACUTE) EXACERBATION   





(2) Upper respiratory infection


Code(s): J06.9 - ACUTE UPPER RESPIRATORY INFECTION, UNSPECIFIED   





(3) Lactic acidosis


Code(s): E87.2 - ACIDOSIS   





(4) Acute renal failure


Code(s): N17.9 - ACUTE KIDNEY FAILURE, UNSPECIFIED   





(5) UTI (urinary tract infection)


Assessment/Plan: 


Possible; to f/u UCX


Code(s): N39.0 - URINARY TRACT INFECTION, SITE NOT SPECIFIED   


Qualifiers: 


   Urinary tract infection type: acute cystitis   Hematuria presence: without 

hematuria   Qualified Code(s): N30.00 - Acute cystitis without hematuria   





(6) HTN (hypertension)


Code(s): I10 - ESSENTIAL (PRIMARY) HYPERTENSION   





(7) Hyperlipidemia


Code(s): E78.5 - HYPERLIPIDEMIA, UNSPECIFIED   





(8) Hypothyroid


Code(s): E03.9 - HYPOTHYROIDISM, UNSPECIFIED   





(9) Diabetes mellitus


Code(s): E11.9 - TYPE 2 DIABETES MELLITUS WITHOUT COMPLICATIONS   


Qualifiers: 


   Diabetes mellitus long term insulin use: without long term use   Diabetes 

mellitus complication detail: with unspecified neuropathy 





Assessment/Plan





Admit to monitor bed.


IV steroids


To f/u BCX, UCX


IVF


s/p IV abtx in ER


on Hold Losartan, HCTZ


Cardio, Pulmonary, Renal Consult


AM labs.


Case was d/w pt's nurse.


(Covering Dr Lemons)

## 2020-01-26 NOTE — PDOC
History of Present Illness





- General


Chief Complaint: Shortness of Breath


Stated Complaint: SHORT OF BREATH


Time Seen by Provider: 01/26/20 10:33


History Source: Patient, Care Provider


Exam Limitations: No Limitations





- History of Present Illness


Initial Comments: 





88 yo F history hypothyroid, HTN, HL, hypokalemia, COPD presents with 2 day 

history of weakness. Today she developed high fever, SOB, cough, wheezing, and 

body aches. Her caretaker was recently ill with similar symptoms. Denies cp, 

abd pain, N/V/D. 








Past History





- Past Medical History


Allergies/Adverse Reactions: 


 Allergies











Allergy/AdvReac Type Severity Reaction Status Date / Time


 


Penicillins Allergy  Rash Verified 08/04/19 12:35


 


meperidine HCl [From Demerol] AdvReac   Verified 08/04/19 12:35


 


lobster Allergy   Uncoded 08/04/19 12:35











Home Medications: 


Ambulatory Orders





Simvastatin 10 mg PO HS 01/10/17 


Budesonide/Formeterol Fumarate [SYMBICORT 160/4.5mcg -] 2 puff IH BID  inhaler 

12/06/18 


Aspirin [ASA -] 81 mg PO DAILY  tab.chew 12/07/18 


Calcium Carbonate/Vitamin D3 [Calcium 500-Vit D3 600 Caplet] 2 each PO DAILY 04/ 04/19 


Cholecalciferol (Vitamin D3) [Vitamin D3] 2,000 unit PO DAILY 04/04/19 


Potassium Chloride [Klor-Con M20] 20 meq PO DAILY 04/04/19 


Acetaminophen [Tylenol .Regular Strength -] 650 mg PO Q6H PRN  tablet 08/09/19 


Anastrozole [Arimidex -] 1 mg PO DAILY  tablet 08/09/19 


Losartan 50Mg/Hctz 12.5MG [Hyzaar -] 2 tab PO DAILY  tablet 08/09/19 


Nystatin Powder [Nystop Powder -] 1 applic TP TID 10 Days #1 applic 08/09/19 


Zinc Oxide 1 applic TP BID #1 tube 08/09/19 


Cilostazol [Pletal -] 100 mg PO BID 01/26/20 


Furosemide [Lasix] 20 mg PO DAILY 01/26/20 


Gabapentin [Neurontin] 300 mg PO TID 01/26/20 


Levothyroxine [Synthroid -] 150 mcg PO DAILY 01/26/20 








Anemia: No


Asthma: No


Cancer: Yes (LEFT BREAST LOBECTOMY)


Cardiac Disorders: Yes (HTN,CAD)


CVA: No


COPD: Yes


CHF: Yes


Dementia: No


Diabetes: Yes


GI Disorders: No


 Disorders: Yes (RENAL INSUFFFICIENCY)


HTN: Yes


Hypercholesterolemia: Yes


Liver Disease: No


Seizures: Yes


Thyroid Disease: Yes





- Surgical History


Abdominal Surgery: No


Appendectomy: Yes


Cardiac Surgery: No


Lung Surgery: No


Neurologic Surgery: No


Orthopedic Surgery: Yes (left ankle with hardware)





- Psycho Social/Smoking Cessation Hx


Smoking History: Former smoker


Have you smoked in the past 12 months: No


Number of Cigarettes Smoked Daily: 0


If you are a former smoker, when did you quit?: 2009


Information on smoking cessation initiated: No


Hx Alcohol Use: No


Drug/Substance Use Hx: No


Substance Use Type: None


Hx Substance Use Treatment: No





**Review of Systems





- Review of Systems


Able to Perform ROS?: Yes


Comments:: 





GENERAL/CONSTITUTIONAL: +Fever/chills. +Weakness.


HEAD, EYES, EARS, NOSE AND THROAT: No change in vision. No ear pain or 

discharge. No sore throat.


CARDIOVASCULAR: No chest pain or shortness of breath.


RESPIRATORY: +Cough and wheezing


GASTROINTESTINAL: No nausea, vomiting, diarrhea or constipation.


GENITOURINARY: No dysuria, frequency, or change in urination.


MUSCULOSKELETAL: No joint or muscle swelling or pain. No neck or back pain.


SKIN: No rash.


NEUROLOGIC: No headache, vertigo, loss of consciousness, or change in strength/

sensation.


ENDOCRINE: No increased thirst. No abnormal weight change.


HEMATOLOGIC/LYMPHATIC: No anemia, easy bleeding, or history of blood clots.


ALLERGIC/IMMUNOLOGIC: No hives or skin allergy.











*Physical Exam





- Vital Signs


 Last Vital Signs











Temp Pulse Resp BP Pulse Ox


 


 102.9 F H  90   28 H  119/66   98 


 


 01/26/20 10:06  01/26/20 10:18  01/26/20 10:06  01/26/20 10:06  01/26/20 10:18














- Physical Exam





GENERAL: Awake, alert, and fully oriented. Mild resp distress- tachypneic, but 

able to speak without difficulty. 


HEAD: No signs of trauma


EYES: PERRLA, EOMI, sclera anicteric, conjunctiva clear


ENT: Auricles normal inspection, hearing grossly normal, nares patent, 

oropharynx clear without exudates. Dry mucosa


NECK: Normal ROM, supple, no lymphadenopathy, JVD, or masses


LUNGS: Dec air entry B/L with diffuse wheezes


HEART: Tachycardic and irregular, normal S1 and S2, no murmurs, rubs or gallops


ABDOMEN: Soft, nontender, normoactive bowel sounds.  No guarding, no rebound.  

No masses


EXTREMITIES: Normal range of motion, no edema.  No clubbing or cyanosis. No 

cords, erythema, or tenderness


NEUROLOGICAL: Cranial nerves II through XII grossly intact.  Normal speech. 

Motor and sensation intact


SKIN: Warm, dry, normal turgor, no rashes or lesions noted.











ED Treatment Course





- LABORATORY


CBC & Chemistry Diagram: 


 01/26/20 12:15





 01/26/20 12:15





Medical Decision Making





- Medical Decision Making





01/26/20 13:20


Late entry. Patient with flu-like illness, now with COPD exacerbation. Sepsis 

order set initiated in light of past history with multiple comorbidities. Will 

give antipyretics, fluids, nebs, steroids. Plan for admission in light of COPD 

and comorbidities.





01/26/20 13:23


Labs reviewed. CMP shows acute renal failure with BUN creatinine ration >20. 

Will continue fluids. Awaiting flu swab. 





01/26/20 14:36


Flu negative. Case discussed [place] Dr. Lemons[place]. 

Patient has had multiple episodes of tachycardia that was irregular, but 

unclear if it was afib (difficult to determine the rhythm on the EKG due to 

ectopy). She responded to two doses of cardizem, now in sinus tach. No prior 

history of afib as per Dr. Lemons. Will contact Dr. Zaragoza, who is covering 

Dr. Lemons as per our conversation. Awaiting callback. 





01/26/20 15:14


Case d/w Dr. Fox Zaragoza, accepted patient for admission to [facility]. Will 

transfer to [facility]. Pt treated for pna with rocephin and azithro based 

on fever and respiratory symptoms, negative flu swab. 











Discharge





- Discharge Information


Problems reviewed: Yes


Clinical Impression/Diagnosis: 


Pneumonia


Qualifiers:


 Pneumonia type: due to unspecified organism Laterality: unspecified laterality 

Lung location: unspecified part of lung Qualified Code(s): J18.9 - Pneumonia, 

unspecified organism





Acute renal failure


Qualifiers:


 Acute renal failure type: unspecified Qualified Code(s): N17.9 - Acute kidney 

failure, unspecified





Condition: Guarded





- Admission


Yes





- Follow up/Referral





- Patient Discharge Instructions





- Post Discharge Activity

## 2020-01-27 LAB
ALBUMIN SERPL-MCNC: 2.7 G/DL (ref 3.4–5)
ALP SERPL-CCNC: 98 U/L (ref 45–117)
ALT SERPL-CCNC: 19 U/L (ref 13–61)
ANION GAP SERPL CALC-SCNC: 8 MMOL/L (ref 8–16)
ANISOCYTOSIS BLD QL: (no result)
APPEARANCE UR: (no result)
AST SERPL-CCNC: 34 U/L (ref 15–37)
BACTERIA # UR AUTO: 5.3 /HPF
BASO STIPL BLD QL SMEAR: (no result)
BASOPHILS # BLD: 0.1 % (ref 0–2)
BILIRUB SERPL-MCNC: 0.2 MG/DL (ref 0.2–1)
BILIRUB UR STRIP.AUTO-MCNC: NEGATIVE MG/DL
BUN SERPL-MCNC: 67.5 MG/DL (ref 7–18)
CALCIUM SERPL-MCNC: 8.2 MG/DL (ref 8.5–10.1)
CASTS URNS QL MICRO: 1 /LPF (ref 0–8)
CHLORIDE SERPL-SCNC: 107 MMOL/L (ref 98–107)
CO2 SERPL-SCNC: 27 MMOL/L (ref 21–32)
COLOR UR: YELLOW
CREAT SERPL-MCNC: 1.8 MG/DL (ref 0.55–1.3)
DEPRECATED RDW RBC AUTO: 15.8 % (ref 11.6–15.6)
EOSINOPHIL # BLD: 0 % (ref 0–4.5)
EPITH CASTS URNS QL MICRO: 0.7 /HPF
GLUCOSE SERPL-MCNC: 181 MG/DL (ref 74–106)
HCT VFR BLD CALC: 31.3 % (ref 32.4–45.2)
HGB BLD-MCNC: 10.2 GM/DL (ref 10.7–15.3)
KETONES UR QL STRIP: NEGATIVE
LEUKOCYTE ESTERASE UR QL STRIP.AUTO: NEGATIVE
LYMPHOCYTES # BLD: 5.7 % (ref 8–40)
MACROCYTES BLD QL: 0
MAGNESIUM SERPL-MCNC: 2.7 MG/DL (ref 1.8–2.4)
MCH RBC QN AUTO: 29.5 PG (ref 25.7–33.7)
MCHC RBC AUTO-ENTMCNC: 32.7 G/DL (ref 32–36)
MCV RBC: 90.1 FL (ref 80–96)
MONOCYTES # BLD AUTO: 2.5 % (ref 3.8–10.2)
NEUTROPHILS # BLD: 91.7 % (ref 42.8–82.8)
NITRITE UR QL STRIP: NEGATIVE
PH UR: 5 [PH] (ref 5–8)
PLATELET # BLD AUTO: 261 K/MM3 (ref 134–434)
PLATELET BLD QL SMEAR: NORMAL
PMV BLD: 9.4 FL (ref 7.5–11.1)
POTASSIUM SERPLBLD-SCNC: 4.1 MMOL/L (ref 3.5–5.1)
PROT SERPL-MCNC: 6.5 G/DL (ref 6.4–8.2)
PROT UR QL STRIP: (no result)
PROT UR QL STRIP: NEGATIVE
RBC # BLD AUTO: 127 /HPF (ref 0–4)
RBC # BLD AUTO: 3.47 M/MM3 (ref 3.6–5.2)
SODIUM SERPL-SCNC: 141 MMOL/L (ref 136–145)
SP GR UR: 1.01 (ref 1.01–1.03)
UROBILINOGEN UR STRIP-MCNC: 0.2 MG/DL (ref 0.2–1)
WBC # BLD AUTO: 12 K/MM3 (ref 4–10)
WBC # UR AUTO: 2 /HPF (ref 0–5)

## 2020-01-27 RX ADMIN — ZINC OXIDE SCH APPLIC: 200 OINTMENT TOPICAL at 14:06

## 2020-01-27 RX ADMIN — GUAIFENESIN AND DEXTROMETHORPHAN HYDROBROMIDE SCH ML: 100; 10 SOLUTION ORAL at 10:22

## 2020-01-27 RX ADMIN — GABAPENTIN SCH MG: 300 CAPSULE ORAL at 05:23

## 2020-01-27 RX ADMIN — IPRATROPIUM BROMIDE AND ALBUTEROL SULFATE SCH AMP: .5; 3 SOLUTION RESPIRATORY (INHALATION) at 16:40

## 2020-01-27 RX ADMIN — HEPARIN SODIUM SCH UNIT: 5000 INJECTION, SOLUTION INTRAVENOUS; SUBCUTANEOUS at 21:21

## 2020-01-27 RX ADMIN — IPRATROPIUM BROMIDE AND ALBUTEROL SULFATE SCH AMP: .5; 3 SOLUTION RESPIRATORY (INHALATION) at 20:30

## 2020-01-27 RX ADMIN — GABAPENTIN SCH MG: 300 CAPSULE ORAL at 14:06

## 2020-01-27 RX ADMIN — GUAIFENESIN AND DEXTROMETHORPHAN HYDROBROMIDE SCH ML: 100; 10 SOLUTION ORAL at 22:11

## 2020-01-27 RX ADMIN — NYSTATIN SCH APPLIC: 100000 POWDER TOPICAL at 21:25

## 2020-01-27 RX ADMIN — GABAPENTIN SCH MG: 300 CAPSULE ORAL at 21:21

## 2020-01-27 RX ADMIN — GUAIFENESIN AND DEXTROMETHORPHAN HYDROBROMIDE SCH ML: 100; 10 SOLUTION ORAL at 05:22

## 2020-01-27 RX ADMIN — ZINC OXIDE SCH APPLIC: 200 OINTMENT TOPICAL at 21:25

## 2020-01-27 RX ADMIN — AZITHROMYCIN DIHYDRATE SCH MLS/HR: 500 INJECTION, POWDER, LYOPHILIZED, FOR SOLUTION INTRAVENOUS at 12:52

## 2020-01-27 RX ADMIN — METHYLPREDNISOLONE SODIUM SUCCINATE SCH MG: 40 INJECTION, POWDER, FOR SOLUTION INTRAMUSCULAR; INTRAVENOUS at 05:15

## 2020-01-27 RX ADMIN — LEVOTHYROXINE SODIUM SCH MCG: 150 TABLET ORAL at 06:39

## 2020-01-27 RX ADMIN — ASPIRIN 81 MG SCH MG: 81 TABLET ORAL at 10:23

## 2020-01-27 RX ADMIN — GUAIFENESIN AND DEXTROMETHORPHAN HYDROBROMIDE SCH ML: 100; 10 SOLUTION ORAL at 17:34

## 2020-01-27 RX ADMIN — METHYLPREDNISOLONE SODIUM SUCCINATE SCH MG: 40 INJECTION, POWDER, FOR SOLUTION INTRAMUSCULAR; INTRAVENOUS at 17:33

## 2020-01-27 RX ADMIN — NYSTATIN SCH APPLIC: 100000 POWDER TOPICAL at 05:23

## 2020-01-27 RX ADMIN — NYSTATIN SCH APPLIC: 100000 POWDER TOPICAL at 14:06

## 2020-01-27 RX ADMIN — METHYLPREDNISOLONE SODIUM SUCCINATE SCH MG: 40 INJECTION, POWDER, FOR SOLUTION INTRAMUSCULAR; INTRAVENOUS at 10:22

## 2020-01-27 RX ADMIN — GUAIFENESIN AND DEXTROMETHORPHAN HYDROBROMIDE SCH ML: 100; 10 SOLUTION ORAL at 00:32

## 2020-01-27 RX ADMIN — GUAIFENESIN AND DEXTROMETHORPHAN HYDROBROMIDE SCH ML: 100; 10 SOLUTION ORAL at 14:06

## 2020-01-27 RX ADMIN — METHYLPREDNISOLONE SODIUM SUCCINATE SCH: 40 INJECTION, POWDER, FOR SOLUTION INTRAMUSCULAR; INTRAVENOUS at 16:07

## 2020-01-27 RX ADMIN — ANASTROZOLE SCH MG: 1 TABLET, COATED ORAL at 10:23

## 2020-01-27 RX ADMIN — CEFTRIAXONE SCH MLS/HR: 1 INJECTION, POWDER, FOR SOLUTION INTRAMUSCULAR; INTRAVENOUS at 12:52

## 2020-01-27 RX ADMIN — ATORVASTATIN CALCIUM SCH MG: 10 TABLET, FILM COATED ORAL at 21:21

## 2020-01-27 NOTE — CONS
INFECTIOUS DISEASE CONSULTATION 

 

DATE OF CONSULTATION:  

 

DATE OF DICTATION:  01/27/2020

 

HISTORY:  The patient is an 89-year-old female who was evaluated for fever.  She

presented to the hospital with a 2-day history of worsening generalized weakness and

a 1-day history of fever.  She also reported shortness of breath, nonproductive

cough, wheezing, and body ache.  She had apparently been in contact with persons with

respiratory tract illnesses.  She was evaluated in the emergency room where her

temperature was 102.9.  She was noted to have a white blood cell count of 12,000 and

a lactic acidosis.  Influenza swab was negative.  Her course was significant for

azotemia.  At the present time, she is awake and alert.  She is seated in bed.  She

is in no acute respiratory distress.  She reports occasional cough, which is

productive of whitish sputum.  She denies any chest pain.  No purulent sputum

production.  The patient is a former smoker.  No recent travel.  She has not been in

contact with people travelling to [place].  She has received influenza vaccine and

pneumococcal vaccine.  No recent hospitalizations.

 

PAST MEDICAL HISTORY:  Positive for hypertension, osteoarthritis, hypothyroidism,

COPD, neurogenic bladder.

 

PAST SURGICAL HISTORY:  Status post left breast biopsy.

 

ALLERGIES:  PENICILLIN and DEMEROL.  Patient reports rash with PENICILLIN.  No

history of anaphylaxis.

 

MEDICATIONS:  Include ceftriaxone, Zithromax, Tylenol, Arimidex, aspirin, Lipitor,

Cardizem, Neurontin, Synthroid, and Solu-Medrol.

 

SOCIAL HISTORY:  Resides at home.  She is a .  Former smoker.  No history of

alcohol abuse.

 

SYSTEMS REVIEW:

Neurologic:  No loss of consciousness, seizure activity, focal weakness.

Cardiac:  Negative chest pain or palpitations.

Respiratory:  As per HPI. 

Gastrointestinal:  Negative vomiting or diarrhea.

Genitourinary:  Negative for urinary tract infection.

 

LABORATORY DATA:  White count 12,000, 91 neutrophils, 5 lymphocytes, 2 monocytes,

hematocrit 31.3, platelets 261, creatinine 1.8, lactic acid 4.4.  Flu swab negative. 

Blood cultures negative.  Urinalysis; 0-2 white cells.

 

PHYSICAL EXAMINATION: 

General:  She is awake.  She is in no acute distress.  Breathing is nonlabored.

Vital Signs:  Temperature 98.6, maximum temperature 102.9, blood pressure 155/74,

pulse 94 regular, respirations 18 per minute.

HEENT:  Sclerae anicteric.

Heart:  Sounds S1, S2.  

Lungs:  Scattered rhonchi and mild wheezing bilaterally. 

Abdomen:  Soft, obese.  Nontender. 

Extremities:  Positive for lower extremity edema.  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

dermatitis.  There is some slight erythema present both lower extremities, left

greater than right with warmth appreciable on the left lower extremity.

 

IMPRESSION: 

1.  Fever, unclear source.

2.  Upper respiratory tract infection.

3.  Rule out early left lower extremity cellulitis.

4.  PENICILLIN allergy.

5.  Exacerbation of chronic obstructive pulmonary disease.

 

PLAN:  Await cultures.  Continue empiric antibiotic coverage with Zithromax and

ceftriaxone.  No history of anaphylaxis.  Further recommendations pending cultures. 

We will follow.

 

Thank you for the kind referral.

 

 

ANNETTE ALVAREZ M.D.

 

ANGELIA/[id number]

DD: 01/27/2020 12:20

DT: 01/27/2020 13:43

Job #:  [id number]

## 2020-01-27 NOTE — PN
Progress Note (short form)





- Note


Progress Note: 


ID CONSULT DICTATED


FEVER ? SOURCE


   ?URI   ? L LE CELLULITIS


PCN ALLERGY    NO HX ANAPHYLAXIS


PENDING SEPSIS W/U EMPIRIC CEFTRIAXONE/ZITHROMAX

## 2020-01-27 NOTE — CON.PULM
Consult


Consult Specialty:: PULMONARY


Referred by:: Dr Zaragoza


Reason for Consultation:: cough





- History of Present Illness


Chief Complaint: fever


History of Present Illness: 





88yo female with h/o HTN, hyperlipidemia, COPD, CKD who was admitted with fevers

, cough and shortness of breath x 2 days. Reports a nonproductive cough and 

wheezing. No chest pain or palpitations. No nausea, vomiting or diarrhea. 

Started on antibiotics and IVF with some improvement in symptoms. She is a long 

time former smoker, quit in 2009, started as a teenager, smoked on average 1.5 

PPD.








- History Source


History Provided By: Patient, Medical Record


Limitations to Obtaining History: No Limitations





- Past Medical History


CNS: Yes: Multiple Sclerosis (with left leg decreased proprioception)


Cardio/Vascular: Yes: HTN, Hyperlipdemia.  No: AFIB, CAD, CHF


Gastrointestinal: Yes: Constipation (BM 3 per week)


Renal/: Yes: Neurogenic Bladder


...Pregnant: No


Musculoskeletal: Yes: Osteoarthritis, Other (left lower extremity weakness)


Endocrine: Yes: Diabetes Mellitus





- Alcohol/Substance Use


Hx Alcohol Use: No





- Smoking History


Smoking history: Former smoker


Have you smoked in the past 12 months: No


Aproximately how many cigarettes per day: 0


If you are a former smoker, when did you quit?: 2009





- Social History


History of Recent Travel: No





Home Medications





- Allergies


Allergies/Adverse Reactions: 


 Allergies











Allergy/AdvReac Type Severity Reaction Status Date / Time


 


Penicillins Allergy  Rash Verified 08/04/19 12:35


 


meperidine HCl [From Demerol] AdvReac   Verified 08/04/19 12:35


 


lobster Allergy   Uncoded 08/04/19 12:35














- Home Medications


Home Medications: 


Ambulatory Orders





Simvastatin 10 mg PO HS 01/10/17 


Budesonide/Formeterol Fumarate [SYMBICORT 160/4.5mcg -] 2 puff IH BID  inhaler 

12/06/18 


Aspirin [ASA -] 81 mg PO DAILY  tab.chew 12/07/18 


Calcium Carbonate/Vitamin D3 [Calcium 500-Vit D3 600 Caplet] 2 each PO DAILY 04/ 04/19 


Cholecalciferol (Vitamin D3) [Vitamin D3] 2,000 unit PO DAILY 04/04/19 


Potassium Chloride [Klor-Con M20] 20 meq PO DAILY 04/04/19 


Acetaminophen [Tylenol .Regular Strength -] 650 mg PO Q6H PRN  tablet 08/09/19 


Anastrozole [Arimidex -] 1 mg PO DAILY  tablet 08/09/19 


Losartan 50Mg/Hctz 12.5MG [Hyzaar -] 2 tab PO DAILY  tablet 08/09/19 


Nystatin Powder [Nystop Powder -] 1 applic TP TID 10 Days #1 applic 08/09/19 


Zinc Oxide 1 applic TP BID #1 tube 08/09/19 


Cilostazol [Pletal -] 100 mg PO BID 01/26/20 


Furosemide [Lasix] 20 mg PO DAILY 01/26/20 


Gabapentin [Neurontin] 300 mg PO TID 01/26/20 


Levothyroxine [Synthroid -] 150 mcg PO DAILY 01/26/20 











Review of Systems





- Review of Systems


Constitutional: reports: Chills, Fever, Weakness


Eyes: denies: Recent Change in Vision


HENT: denies: Nasal Congestion, Throat Pain


Neck: denies: Stiffness, Tenderness


Cardiovascular: reports: Edema, Shortness of Breath.  denies: Chest Pain, 

Palpitations


Respiratory: reports: Cough, SOB, Wheezing.  denies: Hemoptysis


Gastrointestinal: denies: Abdominal Pain, Nausea, Vomiting


Genitourinary: denies: Dysuria, Hematuria


Neurological: denies: Dizziness, Headache


Endocrine: denies: Unexplained Weight Loss





Physical Exam


Vital Sings: 


 Vital Signs











Temperature  98.6 F   01/27/20 10:40


 


Pulse Rate  94 H  01/27/20 10:40


 


Respiratory Rate  18   01/27/20 10:40


 


Blood Pressure  155/74   01/27/20 10:40


 


O2 Sat by Pulse Oximetry (%)  93 L  01/26/20 21:00











Constitutional: Yes: Calm


Eyes: Yes: Conjunctiva Clear, EOM Intact


HENT: Yes: Atraumatic, Normocephalic


Neck: Yes: Supple, Trachea Midline


Cardiovascular: Yes: Regular Rate and Rhythm


Respiratory: Yes: Rhonchi, Wheezes


...Clubbing: No


Gastrointestinal: Yes: Normal Bowel Sounds, Soft.  No: Tenderness


Edema: Yes


Neurological: Yes: Alert, Oriented


Labs: 


 CBC, BMP





 01/27/20 06:35 





 01/27/20 06:35 











Imaging





- Results


Chest X-ray: Report Reviewed, Image Reviewed (no infiltrates)





Problem List





- Problems


(1) COPD with acute exacerbation


Code(s): J44.1 - CHRONIC OBSTRUCTIVE PULMONARY DISEASE W (ACUTE) EXACERBATION   





Assessment/Plan





URI


Acute COPD Exacerbation


Sepsis


Lactic Acidosis


Acute on Chronic Renal Failure


HTN


Hyperlipidemia


Anemia





-  on empiric antibiotics


-  f/u cultures


-  IV medrol


-  inhaled bronchodilators


-  O2 to keep Spo2 >90%


-  IVF


-  monitor urine output, creatinine


-  DVT prophylaxis





Thank you for this consult


Danilo Fernandez MD

## 2020-01-27 NOTE — EKG
Test Reason : 

Blood Pressure : ***/*** mmHG

Vent. Rate : 129 BPM     Atrial Rate : 129 BPM

   P-R Int : 000 ms          QRS Dur : 156 ms

    QT Int : 394 ms       P-R-T Axes : 000 -70 046 degrees

   QTc Int : 577 ms

 

UNDETERMINED RHYTHM Occasional clear sinus rhythm seen: possible sinus

tachycardia with frequent APCs

(RBBB AND LEFT ANTERIOR FASCICULAR BLOCK)

ABNORMAL ECG

WHEN COMPARED WITH ECG OF 26-JAN-2020 10:32,

Repeat ECG after rate reduction

Confirmed by Denise Calloway ([id number]) on 1/27/2020 2:19:38 PM

 

Referred By: HERMELINDA LI           Confirmed By:Denise Calloway

## 2020-01-27 NOTE — PN
Progress Note, Physician


Chief Complaint: 





in bed NAD chart reviewed; coughing no sputum no CP, less SOB; chronic legs 

venous ds L>R





- Current Medication List


Current Medications: 


Active Medications





Acetaminophen (Tylenol -)  650 mg PO Q6H PRN


   PRN Reason: MODERATE PAIN (4-7)


Anastrozole (Arimidex -)  1 mg PO DAILY On license of [facility]


Aspirin (Asa -)  81 mg PO DAILY On license of [facility]


Atorvastatin Calcium (Lipitor -)  10 mg PO HS On license of [facility]


   Last Admin: 01/26/20 21:56 Dose:  10 mg


Budesonide/Formoterol Fumarate (Symbicort 160/4.5mcg -)  2 puff IH BID On license of [facility]


   Last Admin: 01/26/20 21:57 Dose:  2 puff


Cilostazol (Pletal -)  100 mg PO BID On license of [facility]


   Last Admin: 01/26/20 21:58 Dose:  100 mg


Gabapentin (Neurontin -)  300 mg PO TID On license of [facility]


   Last Admin: 01/27/20 05:23 Dose:  300 mg


Guaifenesin (Diabetic Tussin Dm -)  10 ml PO Q4H On license of [facility]


   Last Admin: 01/27/20 05:22 Dose:  10 ml


Sodium Chloride (Normal Saline -)  1,000 mls @ 50 mls/hr IV ASDIR On license of [facility]


   Last Admin: 01/27/20 05:24 Dose:  50 mls/hr


Levothyroxine Sodium (Synthroid -)  150 mcg PO AM On license of [facility]


   Last Admin: 01/27/20 06:39 Dose:  150 mcg


Methylprednisolone Sodium Succinate (Solu-Medrol -)  40 mg IVPUSH Q6H-IV On license of [facility]


   Last Admin: 01/27/20 05:15 Dose:  40 mg


Multi-Ingredient Ointment (Zinc Oxide)  1 applic TP BID On license of [facility]


   Last Admin: 01/26/20 21:58 Dose:  1 applic


Nystatin (Nystop Powder -)  1 applic TP TID On license of [facility]


   Last Admin: 01/27/20 05:23 Dose:  1 applic











- Objective


Vital Signs: 


 Vital Signs











Temperature  97.8 F   01/27/20 06:00


 


Pulse Rate  93 H  01/27/20 06:00


 


Respiratory Rate  17   01/27/20 06:00


 


Blood Pressure  138/67   01/27/20 06:00


 


O2 Sat by Pulse Oximetry (%)  93 L  01/26/20 21:00











Constitutional: Yes: No Distress, Calm


Eyes: Yes: Conjunctiva Clear


HENT: Yes: Atraumatic


Neck: Yes: Supple


Cardiovascular: Yes: Regular Rate and Rhythm


Respiratory: Yes: Diminished


Gastrointestinal: Yes: Soft.  No: Tenderness


Genitourinary: No: Hematuria


Musculoskeletal: No: Joint Stiffness, Joint Swelling


Extremities: Yes: Erythema (L>R).  No: Cold, Cool


Edema: Yes (stasis dermatitis)


Integumentary: Yes: Rash (LLE>RLE)


Neurological: Yes: Alert, Oriented


...Motor Strength: WNL


Psychiatric: Yes: Alert, Oriented.  No: Agitated, Suicidal Ideation


Labs: 


 CBC, BMP





 01/27/20 06:35 





 01/27/20 06:35 











- ....Imaging


Other: Report Reviewed





Assessment/Plan





89 yo F history hypothyroid, HTN, HL, hypokalemia, COPD presents with 2 day 

history of weakness, high fever, SOB, cough, wheezing, and body aches. Chronic 

legs venous stasis and LLErash > RLE, COPD exac


IV ATB, ID eval; f/u cultures;


IV steroids, cardio / pulm eval


DVT falls PFX


f/u labs; d/w pt and staff


coverage dr Lemons

## 2020-01-27 NOTE — EKG
Test Reason : 

Blood Pressure : ***/*** mmHG

Vent. Rate : 121 BPM     Atrial Rate : 121 BPM

   P-R Int : 186 ms          QRS Dur : 152 ms

    QT Int : 402 ms       P-R-T Axes : 025 -66 042 degrees

   QTc Int : 570 ms

 

Likely  SINUS TACHYCARDIA WITH frequent  PREMATURE ATRIAL COMPLEXES

LAFB

RIGHT BUNDLE BRANCH BLOCK

ABNORMAL ECG

WHEN COMPARED WITH ECG OF 04-AUG-2019 13:21,

VENT. RATE HAS INCREASED BY  44 BPM

Confirmed by Denise Calloway ([id number]) on 1/27/2020 2:20:53 PM

 

Referred By: HERMELINDA LI           Confirmed By:Denise Calloway

## 2020-01-27 NOTE — CON.CARD
Consult


Consult Specialty:: cardio


Reason for Consultation:: tachy





- History of Present Illness


Chief Complaint: fever


History of Present Illness: 





89 F presents with 2 day history of weakness and high fever at home.


also admits to SOB, cough, wheezing, and body aches. Her caretaker was recently 

ill with similar symptoms.





temp 102.9 in ER.


tachy to 120s, 


BP 90s and above


+ lactate, mild wbc elevation, KITTY 





pt denies palpitations or chest tightness/discomfort at any time.


++ cough, wheezing--? worse than baseline copd sx's





chronic L > R leg swelling, worse of late.


no pain over the skin





no cardio f/u.


started on cilostazol by wound center ([place]) for resting leg pains--which got 

much better since taking this med








PMH: hypothyroid, HTN, HL, hypokalemia, COPD





- Past Medical History


CNS: Yes: Multiple Sclerosis (with left leg decreased proprioception)


Cardio/Vascular: Yes: HTN, Hyperlipdemia.  No: AFIB, CAD, CHF


Gastrointestinal: Yes: Constipation (BM 3 per week)


Renal/: Yes: Neurogenic Bladder


...Pregnant: No


Musculoskeletal: Yes: Osteoarthritis, Other (left lower extremity weakness)


Endocrine: Yes: Diabetes Mellitus





- Alcohol/Substance Use


Hx Alcohol Use: No





- Smoking History


Smoking history: Former smoker


Have you smoked in the past 12 months: No


Aproximately how many cigarettes per day: 0


If you are a former smoker, when did you quit?: 2009





- Social History


History of Recent Travel: No





Home Medications





- Allergies


Allergies/Adverse Reactions: 


 Allergies











Allergy/AdvReac Type Severity Reaction Status Date / Time


 


Penicillins Allergy  Rash Verified 08/04/19 12:35


 


meperidine HCl [From Demerol] AdvReac   Verified 08/04/19 12:35


 


lobster Allergy   Uncoded 08/04/19 12:35














- Home Medications


Home Medications: 


Ambulatory Orders





Simvastatin 10 mg PO HS 01/10/17 


Budesonide/Formeterol Fumarate [SYMBICORT 160/4.5mcg -] 2 puff IH BID  inhaler 

12/06/18 


Aspirin [ASA -] 81 mg PO DAILY  tab.chew 12/07/18 


Calcium Carbonate/Vitamin D3 [Calcium 500-Vit D3 600 Caplet] 2 each PO DAILY 04/ 04/19 


Cholecalciferol (Vitamin D3) [Vitamin D3] 2,000 unit PO DAILY 04/04/19 


Potassium Chloride [Klor-Con M20] 20 meq PO DAILY 04/04/19 


Acetaminophen [Tylenol .Regular Strength -] 650 mg PO Q6H PRN  tablet 08/09/19 


Anastrozole [Arimidex -] 1 mg PO DAILY  tablet 08/09/19 


Losartan 50Mg/Hctz 12.5MG [Hyzaar -] 2 tab PO DAILY  tablet 08/09/19 


Nystatin Powder [Nystop Powder -] 1 applic TP TID 10 Days #1 applic 08/09/19 


Zinc Oxide 1 applic TP BID #1 tube 08/09/19 


Cilostazol [Pletal -] 100 mg PO BID 01/26/20 


Furosemide [Lasix] 20 mg PO DAILY 01/26/20 


Gabapentin [Neurontin] 300 mg PO TID 01/26/20 


Levothyroxine [Synthroid -] 150 mcg PO DAILY 01/26/20 











Family Medical History


Family History: Denies (no known cmp)





Review of Systems





- Review of Systems


Constitutional: denies: Chills, Fever


Eyes: denies: Eye Pain


HENT: denies: Nasal Congestion


Neck: denies: Stiffness


Cardiovascular: denies: Palpitations


Respiratory: denies: Orthopnea, PND


Gastrointestinal: denies: Diarrhea, Rectal Bleeding


Genitourinary: denies: Burning, Hematuria


Musculoskeletal: denies: Muscle Pain


Integumentary: denies: Rash


Neurological: denies: Numbness, Seizure, Syncope


Endocrine: denies: Excessive Sweating


Hematology/Lymphatic: denies: Excessive Bleeding


Vital Signs: 


 Vital Signs











Temperature  97.8 F   01/27/20 06:00


 


Pulse Rate  93 H  01/27/20 06:00


 


Respiratory Rate  17   01/27/20 06:00


 


Blood Pressure  138/67   01/27/20 06:00


 


O2 Sat by Pulse Oximetry (%)  93 L  01/26/20 21:00











Constitutional: Yes: Well Nourished, No Distress, Obese


Eyes: No: Sclera Icterus


HENT: No: Nasal Congestion


Neck: No: Decreased ROM


Respiratory: Yes: CTA Bilaterally, Wheezes.  No: Accessory Muscle Use, Rales


Gastrointestinal: Yes: Normal Bowel Sounds.  No: Distention, Hepatomegaly, 

Palpable Mass, Tenderness


Cardiovascular: Yes: Regular Rate and Rhythm


JVD: No


Carotid Bruit: No


PMI: Non-Displaced


Heart Sounds: Yes: S1, S2.  No: Gallop


Murmur: No: Systolic Murmur, Diastolic Murmur


Musculoskeletal: Yes: Other (No kyphosis)


Extremities: No: Cool, Cyanosis


Edema: Yes (bilat LE, mild eryth)


Peripheral Pulses: 2+ Left Carotid, 2+ Right Carotid, 2+ Left Doralis Pedis, 2+ 

Right Dorsalis Pedis


Integumentary: No: Jaundice


Neurological: Yes: Alert, Oriented (x3)


Psychiatric: No: Agitated





- Other Data


Labs, Other Data: 


 CBC, BMP





 01/27/20 06:35 





 01/27/20 06:35 





 Troponin, BNP











  01/26/20 01/27/20





  12:15 06:35


 


Troponin I  0.04  < 0.02








 Troponin, BNP











  01/26/20 01/27/20





  12:15 06:35


 


Troponin I  0.04  < 0.02














Assessment/Plan





Echo 2017: nl LV/RV. valve function unremarkable





MPI 2014: no ischemia





ECG x2: probable sinus with frequent APCs. RBBB, LAFB. + long QT. no path q's. 

nonsp ST-Ts--vs priors, the APCs are new, QT previously prolonged





CXR: clear lungs/pleura





tele: SR with APCs














fever:


-CXR clear, UA unremarkable. BCX pending


-? sec to cellulitis, ? a.e. copd--plan per ID





tachycardia:


-ECG's and tele show NSR with APCs, likely triggered by hi fever


-cont tele for now





prolonged QT:


-not a new finding (present on prior ECG 8/19, > 500 msec then as well).


-? effect of cilostazol (known offender with torsades risk associated) or 

Symbicort (formoterol component can prolong QT)--both meds presently being held


-monitor serial ECGs for QT trend


-K, Mag optimized--strict repletion of these parameters


-cont tele for now





KITTY:


-creat 2.8-->1.3 overnight


-baseline 1.4-1.6


-likely sepsis, +/- hypovolemia


-IVF, plan o/w per pmd





copd:


-+ wheezing


-plan per pulm





periph vascular dz, venous ins'y:


-followed by wound center at [place]


-rx'd cilostazol for severe resting LE pains--sx much improved


-holding this med here sec to QT prolongation--monitor for claudication sx's





mult sclerosis:


-per primary

## 2020-01-27 NOTE — CONSULT
Consult - text type





- Consultation


Consultation Note: 





Renal consult for KITTY on CKD





This is a 89 year old  woman with history of CKD, hypertension, 

hyperlipidemia, COPD who presented from home with cough and fever x 2 days 

duration and admitted to r/o influenza/URI with KITTY with Cr of 2.3. Baseline Cr 

based on prior [facility] recorded values is 1.4. Pt reports poor oral intake 

over the past few says. Does sporadically use NSAIDs but nothing recently. Was 

recently on Abx for stye. Denies  No shortness of breath now. + Fever on 

admission. No flank pain. Does have a history of hematuria, etiology unclear. 





PMhx: as above


Allergies: as listed in EMR


Family Hx: NC


Social Hx: No T/A/D


ROS: as per HPI, all other pertinent ros negative


 Home Medications











 Medication  Instructions  Recorded


 


Simvastatin 10 mg PO HS 01/10/17


 


Budesonide/Formeterol Fumarate 2 puff IH BID  inhaler 12/06/18





[SYMBICORT 160/4.5mcg -]  


 


Aspirin [ASA -] 81 mg PO DAILY  tab.chew 12/07/18


 


Calcium Carbonate/Vitamin D3 2 each PO DAILY 04/04/19





[Calcium 500-Vit D3 600 Caplet]  


 


Cholecalciferol (Vitamin D3) 2,000 unit PO DAILY 04/04/19





[Vitamin D3]  


 


Potassium Chloride [Klor-Con M20] 20 meq PO DAILY 04/04/19


 


Acetaminophen [Tylenol .Regular 650 mg PO Q6H PRN  tablet 08/09/19





Strength -]  


 


Anastrozole [Arimidex -] 1 mg PO DAILY  tablet 08/09/19


 


Losartan 50Mg/Hctz 12.5MG [Hyzaar 2 tab PO DAILY  tablet 08/09/19





-]  


 


Nystatin Powder [Nystop Powder -] 1 applic TP TID 10 Days #1 applic 08/09/19


 


Zinc Oxide 1 applic TP BID #1 tube 08/09/19


 


Cilostazol [Pletal -] 100 mg PO BID 01/26/20


 


Furosemide [Lasix] 20 mg PO DAILY 01/26/20


 


Gabapentin [Neurontin] 300 mg PO TID 01/26/20


 


Levothyroxine [Synthroid -] 150 mcg PO DAILY 01/26/20








 Vital Signs











Temperature  98.6 F   01/27/20 10:40


 


Pulse Rate  94 H  01/27/20 10:40


 


Respiratory Rate  18   01/27/20 10:40


 


Blood Pressure  155/74   01/27/20 10:40


 


O2 Sat by Pulse Oximetry (%)  93 L  01/26/20 21:00











NAD


awake and alert


neck supple


no JVD


RRR, no M/R


bilateral air entry, slight wheeze 


soft NT/ND, no guarding


+ LE edema, mild erythema 


 CBC, BMP





 01/27/20 06:35 





 01/27/20 06:35 





 Current Medications





Acetaminophen (Tylenol -)  650 mg PO Q6H PRN


   PRN Reason: MODERATE PAIN (4-7)


Anastrozole (Arimidex -)  1 mg PO DAILY [facility]


   Last Admin: 01/27/20 10:23 Dose:  1 mg


Aspirin (Asa -)  81 mg PO DAILY [facility]


   Last Admin: 01/27/20 10:23 Dose:  81 mg


Atorvastatin Calcium (Lipitor -)  10 mg PO HS [facility]


   Last Admin: 01/26/20 21:56 Dose:  10 mg


Gabapentin (Neurontin -)  300 mg PO TID [facility]


   Last Admin: 01/27/20 05:23 Dose:  300 mg


Guaifenesin (Diabetic Tussin Dm -)  10 ml PO Q4H [facility]


   Last Admin: 01/27/20 10:22 Dose:  10 ml


Sodium Chloride (Normal Saline -)  1,000 mls @ 50 mls/hr IV ASDIR [facility]


   Last Admin: 01/27/20 05:24 Dose:  50 mls/hr


Ceftriaxone Sodium 1,000 mg/ (Dextrose)  50 mls @ 100 mls/hr IVPB DAILY [facility]


Levothyroxine Sodium (Synthroid -)  150 mcg PO AM [facility]


   Last Admin: 01/27/20 06:39 Dose:  150 mcg


Methylprednisolone Sodium Succinate (Solu-Medrol -)  40 mg IVPUSH Q6H-IV [facility]


   Last Admin: 01/27/20 10:22 Dose:  40 mg


Multi-Ingredient Ointment (Zinc Oxide)  1 applic TP BID [facility]


   Last Admin: 01/26/20 21:58 Dose:  1 applic


Nystatin (Nystop Powder -)  1 applic TP TID [facility]


   Last Admin: 01/27/20 05:23 Dose:  1 applic





89 year old  woman with history of CKD, hypertension, hyperlipidemia, 

COPD who presented from home with cough and fever x 2 days duration and 

admitted to r/o influenza/URI with KITTY with Cr of 2.3.





1. Acute on chronic renal insufficiency likely due to intravascular volume 

depletion vs. AIN vs. GN (hematuria, porteinuria)


2. CKD stage 3a


3. Multiple bilateral renal cysts


4. Fever


5. COPD


6. Anemia 





Renal function already with signs of improvement.


There is no overt electrolyte or acid/base disturbance noted at this time.


Pt has mild edema in her lower extremities, likely chronic


would continue isotonic saline for additional 24 hours and then discontinue


encouraged oral solute intake


FeNa is is 0.69% consistent with preserved tubular function


Check Urine eosinophils, legionella


Repeat UA and Urine culture


Pt does not have polycystic kidney disease despite the evidence of cysts on 

both kidneys


continue Abx as per ID


f/u blood cultures


Trend renal function and electrolytes daily. 





Kwame Mendenhall DO

## 2020-01-28 LAB
ALBUMIN SERPL-MCNC: 2.8 G/DL (ref 3.4–5)
ALP SERPL-CCNC: 88 U/L (ref 45–117)
ALT SERPL-CCNC: 21 U/L (ref 13–61)
ANION GAP SERPL CALC-SCNC: 7 MMOL/L (ref 8–16)
ANISOCYTOSIS BLD QL: 0
AST SERPL-CCNC: 38 U/L (ref 15–37)
BASOPHILS # BLD: 0 % (ref 0–2)
BILIRUB SERPL-MCNC: 0.2 MG/DL (ref 0.2–1)
BUN SERPL-MCNC: 51.5 MG/DL (ref 7–18)
CALCIUM SERPL-MCNC: 7.8 MG/DL (ref 8.5–10.1)
CHLORIDE SERPL-SCNC: 109 MMOL/L (ref 98–107)
CO2 SERPL-SCNC: 28 MMOL/L (ref 21–32)
CREAT SERPL-MCNC: 1.4 MG/DL (ref 0.55–1.3)
DEPRECATED RDW RBC AUTO: 16 % (ref 11.6–15.6)
EOSINOPHIL # BLD: 0.1 % (ref 0–4.5)
GLUCOSE SERPL-MCNC: 172 MG/DL (ref 74–106)
HCT VFR BLD CALC: 31.2 % (ref 32.4–45.2)
HGB BLD-MCNC: 10.2 GM/DL (ref 10.7–15.3)
LYMPHOCYTES # BLD: 4 % (ref 8–40)
MACROCYTES BLD QL: 0
MCH RBC QN AUTO: 29.3 PG (ref 25.7–33.7)
MCHC RBC AUTO-ENTMCNC: 32.6 G/DL (ref 32–36)
MCV RBC: 89.9 FL (ref 80–96)
MONOCYTES # BLD AUTO: 2.5 % (ref 3.8–10.2)
NEUTROPHILS # BLD: 93.4 % (ref 42.8–82.8)
PLATELET # BLD AUTO: 266 K/MM3 (ref 134–434)
PMV BLD: 9.8 FL (ref 7.5–11.1)
POTASSIUM SERPLBLD-SCNC: 4 MMOL/L (ref 3.5–5.1)
PROT SERPL-MCNC: 6.4 G/DL (ref 6.4–8.2)
RBC # BLD AUTO: 3.48 M/MM3 (ref 3.6–5.2)
SODIUM SERPL-SCNC: 144 MMOL/L (ref 136–145)
WBC # BLD AUTO: 14.9 K/MM3 (ref 4–10)

## 2020-01-28 RX ADMIN — GUAIFENESIN AND DEXTROMETHORPHAN HYDROBROMIDE SCH ML: 100; 10 SOLUTION ORAL at 10:33

## 2020-01-28 RX ADMIN — HEPARIN SODIUM SCH UNIT: 5000 INJECTION, SOLUTION INTRAVENOUS; SUBCUTANEOUS at 21:11

## 2020-01-28 RX ADMIN — METHYLPREDNISOLONE SODIUM SUCCINATE SCH MG: 40 INJECTION, POWDER, FOR SOLUTION INTRAMUSCULAR; INTRAVENOUS at 17:00

## 2020-01-28 RX ADMIN — HEPARIN SODIUM SCH UNIT: 5000 INJECTION, SOLUTION INTRAVENOUS; SUBCUTANEOUS at 09:48

## 2020-01-28 RX ADMIN — METHYLPREDNISOLONE SODIUM SUCCINATE SCH MG: 40 INJECTION, POWDER, FOR SOLUTION INTRAMUSCULAR; INTRAVENOUS at 02:56

## 2020-01-28 RX ADMIN — ANASTROZOLE SCH MG: 1 TABLET, COATED ORAL at 09:48

## 2020-01-28 RX ADMIN — GUAIFENESIN AND DEXTROMETHORPHAN HYDROBROMIDE SCH ML: 100; 10 SOLUTION ORAL at 17:00

## 2020-01-28 RX ADMIN — GABAPENTIN SCH MG: 300 CAPSULE ORAL at 07:38

## 2020-01-28 RX ADMIN — GUAIFENESIN AND DEXTROMETHORPHAN HYDROBROMIDE SCH ML: 100; 10 SOLUTION ORAL at 13:38

## 2020-01-28 RX ADMIN — GUAIFENESIN AND DEXTROMETHORPHAN HYDROBROMIDE SCH ML: 100; 10 SOLUTION ORAL at 07:38

## 2020-01-28 RX ADMIN — ASPIRIN 81 MG SCH MG: 81 TABLET ORAL at 09:47

## 2020-01-28 RX ADMIN — LEVOTHYROXINE SODIUM SCH MCG: 150 TABLET ORAL at 07:39

## 2020-01-28 RX ADMIN — GUAIFENESIN AND DEXTROMETHORPHAN HYDROBROMIDE SCH: 100; 10 SOLUTION ORAL at 02:54

## 2020-01-28 RX ADMIN — AZITHROMYCIN DIHYDRATE SCH MLS/HR: 500 INJECTION, POWDER, LYOPHILIZED, FOR SOLUTION INTRAVENOUS at 09:48

## 2020-01-28 RX ADMIN — ATORVASTATIN CALCIUM SCH MG: 10 TABLET, FILM COATED ORAL at 21:11

## 2020-01-28 RX ADMIN — CEFTRIAXONE SCH MLS/HR: 1 INJECTION, POWDER, FOR SOLUTION INTRAMUSCULAR; INTRAVENOUS at 09:48

## 2020-01-28 RX ADMIN — METHYLPREDNISOLONE SODIUM SUCCINATE SCH MG: 40 INJECTION, POWDER, FOR SOLUTION INTRAMUSCULAR; INTRAVENOUS at 09:48

## 2020-01-28 RX ADMIN — GUAIFENESIN AND DEXTROMETHORPHAN HYDROBROMIDE SCH ML: 100; 10 SOLUTION ORAL at 21:11

## 2020-01-28 RX ADMIN — IPRATROPIUM BROMIDE AND ALBUTEROL SULFATE SCH AMP: .5; 3 SOLUTION RESPIRATORY (INHALATION) at 12:44

## 2020-01-28 RX ADMIN — NYSTATIN SCH APPLIC: 100000 POWDER TOPICAL at 13:40

## 2020-01-28 RX ADMIN — ZINC OXIDE SCH APPLIC: 200 OINTMENT TOPICAL at 21:15

## 2020-01-28 RX ADMIN — IPRATROPIUM BROMIDE AND ALBUTEROL SULFATE SCH AMP: .5; 3 SOLUTION RESPIRATORY (INHALATION) at 20:30

## 2020-01-28 RX ADMIN — GABAPENTIN SCH MG: 300 CAPSULE ORAL at 13:34

## 2020-01-28 RX ADMIN — ZINC OXIDE SCH APPLIC: 200 OINTMENT TOPICAL at 10:33

## 2020-01-28 RX ADMIN — NYSTATIN SCH APPLIC: 100000 POWDER TOPICAL at 07:38

## 2020-01-28 RX ADMIN — NYSTATIN SCH APPLIC: 100000 POWDER TOPICAL at 21:16

## 2020-01-28 RX ADMIN — IPRATROPIUM BROMIDE AND ALBUTEROL SULFATE SCH AMP: .5; 3 SOLUTION RESPIRATORY (INHALATION) at 15:28

## 2020-01-28 RX ADMIN — IPRATROPIUM BROMIDE AND ALBUTEROL SULFATE SCH AMP: .5; 3 SOLUTION RESPIRATORY (INHALATION) at 08:23

## 2020-01-28 RX ADMIN — GABAPENTIN SCH MG: 300 CAPSULE ORAL at 21:11

## 2020-01-28 NOTE — PN
Progress Note, Physician


History of Present Illness: 





Pt with dyspneea episode in AM while laying flat to be washed, not associated 

with CP, palpitations..


No SOB, CP, palp, wheezing since then





- Current Medication List


Current Medications: 


Active Medications





Acetaminophen (Tylenol -)  650 mg PO Q6H PRN


   PRN Reason: MODERATE PAIN (4-7)


Albuterol Sulfate (Ventolin 0.083% Nebulizer Soln -)  1 amp NEB Q4H PRN


   PRN Reason: SHORT OF BREATH/WHEEZING


Albuterol/Ipratropium (Duoneb -)  1 amp NEB RQID [facility]


   Last Admin: 01/28/20 15:28 Dose:  1 amp


Anastrozole (Arimidex -)  1 mg PO DAILY [facility]


   Last Admin: 01/28/20 09:48 Dose:  1 mg


Aspirin (Asa -)  81 mg PO DAILY [facility]


   Last Admin: 01/28/20 09:47 Dose:  81 mg


Atorvastatin Calcium (Lipitor -)  10 mg PO HS [facility]


   Last Admin: 01/27/20 21:21 Dose:  10 mg


Furosemide (Lasix -)  20 mg PO DAILY [facility]


Gabapentin (Neurontin -)  300 mg PO TID [facility]


   Last Admin: 01/28/20 13:34 Dose:  300 mg


Guaifenesin (Diabetic Tussin Dm -)  10 ml PO Q4H [facility]


   Last Admin: 01/28/20 17:00 Dose:  10 ml


Heparin Sodium (Porcine) (Heparin -)  5,000 unit SQ BID [facility]


   Last Admin: 01/28/20 09:48 Dose:  5,000 unit


Hydrochlorothiazide (Hctz -)  12.5 mg PO DAILY [facility]


Ceftriaxone Sodium 1 gm/ (Dextrose)  50 mls @ 100 mls/hr IVPB DAILY [facility]


   Last Admin: 01/28/20 09:48 Dose:  100 mls/hr


Azithromycin (Zithromax 500mg Ivpb (Pre-Docked))  500 mg in 250 mls @ 250 mls/

hr IVPB DAILY [facility]


   Last Admin: 01/28/20 09:48 Dose:  250 mls/hr


Levothyroxine Sodium (Synthroid -)  150 mcg PO AM [facility]


   Last Admin: 01/28/20 07:39 Dose:  150 mcg


Losartan Potassium (Cozaar -)  50 mg PO DAILY [facility]


Methylprednisolone Sodium Succinate (Solu-Medrol -)  40 mg IVPUSH Q8H-IV [facility]


   Last Admin: 01/28/20 17:00 Dose:  40 mg


Multi-Ingredient Ointment (Zinc Oxide)  1 applic TP BID [facility]


   Last Admin: 01/28/20 10:33 Dose:  1 applic


Nystatin (Nystop Powder -)  1 applic TP TID [facility]


   Last Admin: 01/28/20 13:40 Dose:  1 applic











- Objective


Vital Signs: 


 Vital Signs











Temperature  98.4 F   01/28/20 14:18


 


Pulse Rate  102 H  01/28/20 14:18


 


Respiratory Rate  22 H  01/28/20 14:18


 


Blood Pressure  127/71   01/28/20 14:18


 


O2 Sat by Pulse Oximetry (%)  95   01/28/20 09:00











Constitutional: Yes: No Distress, Calm


Cardiovascular: Yes: Regular Rate and Rhythm, S1, S2


Respiratory: Yes: Regular, Wheezes


Gastrointestinal: Yes: Normal Bowel Sounds, Soft, Tenderness


Neurological: Yes: Alert, Oriented


Labs: 


 CBC, BMP





 01/28/20 05:20 





 01/28/20 05:20 











Problem List





- Problems


(1) COPD with acute exacerbation


Code(s): J44.1 - CHRONIC OBSTRUCTIVE PULMONARY DISEASE W (ACUTE) EXACERBATION   





(2) Upper respiratory infection


Code(s): J06.9 - ACUTE UPPER RESPIRATORY INFECTION, UNSPECIFIED   





(3) Lactic acidosis


Code(s): E87.2 - ACIDOSIS   





(4) Acute renal failure


Code(s): N17.9 - ACUTE KIDNEY FAILURE, UNSPECIFIED   





(5) HTN (hypertension)


Code(s): I10 - ESSENTIAL (PRIMARY) HYPERTENSION   





(6) Hyperlipidemia


Code(s): E78.5 - HYPERLIPIDEMIA, UNSPECIFIED   





(7) Hypothyroid


Code(s): E03.9 - HYPOTHYROIDISM, UNSPECIFIED   





(8) Diabetes mellitus


Code(s): E11.9 - TYPE 2 DIABETES MELLITUS WITHOUT COMPLICATIONS   


Qualifiers: 


   Diabetes mellitus long term insulin use: without long term use   Diabetes 

mellitus complication detail: with unspecified neuropathy 





Assessment/Plan





Admitted  to monitor bed.


IV steroids


Creatinine is better, Off IVF


Cardio, Pulmonary, Renal, ID Consults are appreciated.


AM labs.


PT


Case was d/w pt's nurse.


(Covering Dr. Lemons)

## 2020-01-28 NOTE — PN
Progress Note, Physician


History of Present Illness: 





AWEAKE, ALERT IN BED


REPORTS FEELING BETTER


NO C/O CHEST PAIN/ DYSPNEA/ COUGH


NO C/O LEG PAIN


TEMPS DOWN   AFEBRILE


TOLERATED CEPHALOSPORIN





- Current Medication List


Current Medications: 


Active Medications





Acetaminophen (Tylenol -)  650 mg PO Q6H PRN


   PRN Reason: MODERATE PAIN (4-7)


Albuterol Sulfate (Ventolin 0.083% Nebulizer Soln -)  1 amp NEB Q4H PRN


   PRN Reason: SHORT OF BREATH/WHEEZING


Albuterol/Ipratropium (Duoneb -)  1 amp NEB RQID [facility]


   Last Admin: 01/28/20 15:28 Dose:  1 amp


Anastrozole (Arimidex -)  1 mg PO DAILY [facility]


   Last Admin: 01/28/20 09:48 Dose:  1 mg


Aspirin (Asa -)  81 mg PO DAILY [facility]


   Last Admin: 01/28/20 09:47 Dose:  81 mg


Atorvastatin Calcium (Lipitor -)  10 mg PO HS [facility]


   Last Admin: 01/27/20 21:21 Dose:  10 mg


Furosemide (Lasix -)  20 mg PO DAILY [facility]


Gabapentin (Neurontin -)  300 mg PO TID [facility]


   Last Admin: 01/28/20 13:34 Dose:  300 mg


Guaifenesin (Diabetic Tussin Dm -)  10 ml PO Q4H [facility]


   Last Admin: 01/28/20 17:00 Dose:  10 ml


Heparin Sodium (Porcine) (Heparin -)  5,000 unit SQ BID [facility]


   Last Admin: 01/28/20 09:48 Dose:  5,000 unit


Hydrochlorothiazide (Hctz -)  12.5 mg PO DAILY [facility]


Ceftriaxone Sodium 1 gm/ (Dextrose)  50 mls @ 100 mls/hr IVPB DAILY [facility]


   Last Admin: 01/28/20 09:48 Dose:  100 mls/hr


Azithromycin (Zithromax 500mg Ivpb (Pre-Docked))  500 mg in 250 mls @ 250 mls/

hr IVPB DAILY [facility]


   Last Admin: 01/28/20 09:48 Dose:  250 mls/hr


Levothyroxine Sodium (Synthroid -)  150 mcg PO AM [facility]


   Last Admin: 01/28/20 07:39 Dose:  150 mcg


Losartan Potassium (Cozaar -)  50 mg PO DAILY [facility]


Methylprednisolone Sodium Succinate (Solu-Medrol -)  40 mg IVPUSH Q8H-IV [facility]


   Last Admin: 01/28/20 17:00 Dose:  40 mg


Multi-Ingredient Ointment (Zinc Oxide)  1 applic TP BID [facility]


   Last Admin: 01/28/20 10:33 Dose:  1 applic


Nystatin (Nystop Powder -)  1 applic TP TID [facility]


   Last Admin: 01/28/20 13:40 Dose:  1 applic











- Objective


Vital Signs: 


 Vital Signs











Temperature  98.4 F   01/28/20 14:18


 


Pulse Rate  102 H  01/28/20 14:18


 


Respiratory Rate  22 H  01/28/20 14:18


 


Blood Pressure  127/71   01/28/20 14:18


 


O2 Sat by Pulse Oximetry (%)  95   01/28/20 09:00











Constitutional: Yes: No Distress


Eyes: Yes: Conjunctiva Clear


Cardiovascular: Yes: Regular Rate and Rhythm, S1, S2


Respiratory: Yes: CTA Bilaterally


Gastrointestinal: Yes: Normal Bowel Sounds, Soft


Extremities: Yes: Other (+ BILATERAL STASIS DERMATITIS   SL ERYTHEMA/ WARMTH)


Labs: 


 CBC, BMP





 01/28/20 05:20 





 01/28/20 05:20 











Assessment/Plan





URI


EXACERBATION COPD


? CELLULITIS LE


PCN ALLERGY


CONTINUE EMPIRIC CEFTRIAXONE/ ZITHROMAX

## 2020-01-28 NOTE — PN
Progress Note (short form)





- Note


Progress Note: 





Renal follow up for KITTY





Seen and examined at the bedside


awake and alert


has episode of shrotness of breath and cough this am when asked to lay flat to 

change


feels better now


making urine


tolerating diet 





 Vital Signs











Temperature  98.8 F   01/28/20 10:25


 


Pulse Rate  90   01/28/20 10:25


 


Respiratory Rate  20   01/28/20 10:25


 


Blood Pressure  156/70   01/28/20 10:25


 


O2 Sat by Pulse Oximetry (%)  95   01/27/20 20:11








 Intake & Output











 01/25/20 01/26/20 01/27/20 01/28/20





 23:59 23:59 23:59 23:59


 


Intake Total  1600 850 475


 


Output Total  600 1600 


 


Balance  1000 -750 475


 


Weight  117.934 kg  109.769 kg








NAD


awake and alert


RRR, no M/R


bilateral air entry, slight wheeze 


soft NT/ND, no guarding


+ LE edema, mild erythema 





 CBC, BMP





 01/28/20 05:20 





 01/28/20 05:20 





 Current Medications





Acetaminophen (Tylenol -)  650 mg PO Q6H PRN


   PRN Reason: MODERATE PAIN (4-7)


Albuterol Sulfate (Ventolin 0.083% Nebulizer Soln -)  1 amp NEB Q4H PRN


   PRN Reason: SHORT OF BREATH/WHEEZING


Albuterol/Ipratropium (Duoneb -)  1 amp NEB RQID [facility]


   Last Admin: 01/28/20 08:23 Dose:  1 amp


Anastrozole (Arimidex -)  1 mg PO DAILY [facility]


   Last Admin: 01/28/20 09:48 Dose:  1 mg


Aspirin (Asa -)  81 mg PO DAILY [facility]


   Last Admin: 01/28/20 09:47 Dose:  81 mg


Atorvastatin Calcium (Lipitor -)  10 mg PO HS [facility]


   Last Admin: 01/27/20 21:21 Dose:  10 mg


Furosemide (Lasix -)  20 mg PO DAILY [facility]


Gabapentin (Neurontin -)  300 mg PO TID [facility]


   Last Admin: 01/28/20 07:38 Dose:  300 mg


Guaifenesin (Diabetic Tussin Dm -)  10 ml PO Q4H [facility]


   Last Admin: 01/28/20 10:33 Dose:  10 ml


HCTZ/Losartan Potassium (Hyzaar -)  1 tab PO DAILY [facility]


Heparin Sodium (Porcine) (Heparin -)  5,000 unit SQ BID [facility]


   Last Admin: 01/28/20 09:48 Dose:  5,000 unit


Ceftriaxone Sodium 1 gm/ (Dextrose)  50 mls @ 100 mls/hr IVPB DAILY [facility]


   Last Admin: 01/28/20 09:48 Dose:  100 mls/hr


Azithromycin (Zithromax 500mg Ivpb (Pre-Docked))  500 mg in 250 mls @ 250 mls/

hr IVPB DAILY [facility]


   Last Admin: 01/28/20 09:48 Dose:  250 mls/hr


Levothyroxine Sodium (Synthroid -)  150 mcg PO AM [facility]


   Last Admin: 01/28/20 07:39 Dose:  150 mcg


Methylprednisolone Sodium Succinate (Solu-Medrol -)  40 mg IVPUSH Q8H-IV [facility]


   Last Admin: 01/28/20 09:48 Dose:  40 mg


Multi-Ingredient Ointment (Zinc Oxide)  1 applic TP BID [facility]


   Last Admin: 01/28/20 10:33 Dose:  1 applic


Nystatin (Nystop Powder -)  1 applic TP TID [facility]


   Last Admin: 01/28/20 07:38 Dose:  1 applic











89 year old  woman with history of CKD, hypertension, hyperlipidemia, 

COPD who presented from home with cough and fever x 2 days duration and 

admitted to r/o influenza/URI with KITTY with Cr of 2.3.





1. Acute on chronic renal insufficiency likely due to intravascular volume 

depletion vs. AIN vs. GN (hematuria, porteinuria)


2. CKD stage 3a


3. Multiple bilateral renal cysts


4. Fever


5. COPD


6. Anemia 





Renal function now improved to baseline


FeNa consistant with pre-renal injury and UPCR is < 1


There is no overt electrolyte or acid/base disturbance noted at this time.


Pt has mild edema in her lower extremities, likely chronic


Can be off IVF


To restart Lasix and Hyzaar tomorrow 


encouraged oral solute intake


Repeat urine culture pending


Pt does not have polycystic kidney disease despite the evidence of cysts on 

both kidneys


continue Abx as per ID


f/u blood cultures


Trend renal function and electrolytes daily. 





Kwame Mendenhall DO

## 2020-01-28 NOTE — PN
Progress Note (short form)





- Note


Progress Note: 


s: no chest pain, palps, dizziness, dyspnea.  complains of cough.  


 Current Medications





Acetaminophen (Tylenol -)  650 mg PO Q6H PRN


   PRN Reason: MODERATE PAIN (4-7)


Albuterol Sulfate (Ventolin 0.083% Nebulizer Soln -)  1 amp NEB Q4H PRN


   PRN Reason: SHORT OF BREATH/WHEEZING


Albuterol/Ipratropium (Duoneb -)  1 amp NEB RQID [facility]


   Last Admin: 01/28/20 08:23 Dose:  1 amp


Anastrozole (Arimidex -)  1 mg PO DAILY [facility]


   Last Admin: 01/28/20 09:48 Dose:  1 mg


Aspirin (Asa -)  81 mg PO DAILY [facility]


   Last Admin: 01/28/20 09:47 Dose:  81 mg


Atorvastatin Calcium (Lipitor -)  10 mg PO HS [facility]


   Last Admin: 01/27/20 21:21 Dose:  10 mg


Gabapentin (Neurontin -)  300 mg PO TID [facility]


   Last Admin: 01/28/20 07:38 Dose:  300 mg


Guaifenesin (Diabetic Tussin Dm -)  10 ml PO Q4H [facility]


   Last Admin: 01/28/20 10:33 Dose:  10 ml


Heparin Sodium (Porcine) (Heparin -)  5,000 unit SQ BID [facility]


   Last Admin: 01/28/20 09:48 Dose:  5,000 unit


Ceftriaxone Sodium 1 gm/ (Dextrose)  50 mls @ 100 mls/hr IVPB DAILY [facility]


   Last Admin: 01/28/20 09:48 Dose:  100 mls/hr


Azithromycin (Zithromax 500mg Ivpb (Pre-Docked))  500 mg in 250 mls @ 250 mls/

hr IVPB DAILY [facility]


   Last Admin: 01/28/20 09:48 Dose:  250 mls/hr


Levothyroxine Sodium (Synthroid -)  150 mcg PO AM [facility]


   Last Admin: 01/28/20 07:39 Dose:  150 mcg


Methylprednisolone Sodium Succinate (Solu-Medrol -)  40 mg IVPUSH Q8H-IV [facility]


   Last Admin: 01/28/20 09:48 Dose:  40 mg


Multi-Ingredient Ointment (Zinc Oxide)  1 applic TP BID [facility]


   Last Admin: 01/28/20 10:33 Dose:  1 applic


Nystatin (Nystop Powder -)  1 applic TP TID [facility]


   Last Admin: 01/28/20 07:38 Dose:  1 applic





 Vital Signs











 Period  Temp  Pulse  Resp  BP Sys/Spence  Pulse Ox


 


 Last 24 Hr  97.8 F-98.8 F    20-22  128-164/62-84  95














Constitutional: Yes: Well Nourished, No Distress, Obese


Eyes: No: Sclera Icterus


HENT: No: Nasal Congestion


Neck: No: Decreased ROM


Respiratory: Yes: CTA Bilaterally, Wheezes.  No: Accessory Muscle Use, Rales


Gastrointestinal: Yes: Normal Bowel Sounds.  No: Distention, Hepatomegaly, 

Palpable Mass, Tenderness


Cardiovascular: Yes: Regular Rate and Rhythm


JVD: No


Carotid Bruit: No


PMI: Non-Displaced


Heart Sounds: Yes: S1, S2.  No: Gallop


Murmur: No: Systolic Murmur, Diastolic Murmur


Musculoskeletal: Yes: Other (No kyphosis)


Extremities: No: Cool, Cyanosis


Edema: Yes (bilat LE, mild eryth)


Peripheral Pulses: 2+ Left Carotid, 2+ Right Carotid, 2+ Left Doralis Pedis, 2+ 

Right Dorsalis Pedis


Integumentary: No: Jaundice


Neurological: Yes: Alert, Oriented (x3)


Psychiatric: No: Agitated








Echo 2017: nl LV/RV. valve function unremarkable





MPI 2014: no ischemia





ECG x2: probable sinus with frequent APCs. RBBB, LAFB. + long QT. no path q's. 

nonsp ST-Ts--vs priors, the APCs are new, QT previously prolonged





CXR: clear lungs/pleura





tele: sinus














fever:


-CXR clear, UA unremarkable. BCX pending


-? sec to cellulitis, ? a.e. copd--plan per ID





tachycardia:


-ECG's and tele show NSR with APCs, likely triggered by hi fever


-cont tele for now





prolonged QT:


-not a new finding (present on prior ECG 8/19, > 500 msec then as well).


-? effect of cilostazol (known offender with torsades risk associated) or 

Symbicort (formoterol component can prolong QT)--both meds presently being held


-monitor serial ECGs for QT trend 


-K, Mag optimized--strict repletion of these parameters


-cont tele for now





KITTY:


-creat 2.8-->1.3 overnight


-baseline 1.4-1.6


-likely sepsis, +/- hypovolemia


-IVF, plan o/w per pmd





copd:


-+ wheezing


-plan per pulm





periph vascular dz, venous ins'y:


-followed by wound center at [place]


-rx'd cilostazol for severe resting LE pains--sx much improved


-holding this med here sec to QT prolongation--monitor for claudication sx's





mult sclerosis:


-per primary

## 2020-01-28 NOTE — PN
Progress Note (short form)





- Note


Progress Note: 





PULMONARY





Episodes of dyspnea overnight but improved with nebulizer treatments.





 Vital Signs











 Period  Temp  Pulse  Resp  BP Sys/Spence  Pulse Ox


 


 Last 24 Hr  97.8 F-98.8 F    20-22  128-164/62-84  95








Gen:  less tachypneic


Heart: RRR


Lung: scattered rhonchi, wheezes


Abd: soft, nontender


Ext: + edema





 CBC, BMP





 01/28/20 05:20 





 01/28/20 05:20 





Active Medications





Acetaminophen (Tylenol -)  650 mg PO Q6H PRN


   PRN Reason: MODERATE PAIN (4-7)


Albuterol Sulfate (Ventolin 0.083% Nebulizer Soln -)  1 amp NEB Q4H PRN


   PRN Reason: SHORT OF BREATH/WHEEZING


Albuterol/Ipratropium (Duoneb -)  1 amp NEB RQID [facility]


   Last Admin: 01/28/20 12:44 Dose:  1 amp


Anastrozole (Arimidex -)  1 mg PO DAILY [facility]


   Last Admin: 01/28/20 09:48 Dose:  1 mg


Aspirin (Asa -)  81 mg PO DAILY [facility]


   Last Admin: 01/28/20 09:47 Dose:  81 mg


Atorvastatin Calcium (Lipitor -)  10 mg PO HS [facility]


   Last Admin: 01/27/20 21:21 Dose:  10 mg


Furosemide (Lasix -)  20 mg PO DAILY [facility]


Gabapentin (Neurontin -)  300 mg PO TID [facility]


   Last Admin: 01/28/20 07:38 Dose:  300 mg


Guaifenesin (Diabetic Tussin Dm -)  10 ml PO Q4H [facility]


   Last Admin: 01/28/20 10:33 Dose:  10 ml


Heparin Sodium (Porcine) (Heparin -)  5,000 unit SQ BID [facility]


   Last Admin: 01/28/20 09:48 Dose:  5,000 unit


Hydrochlorothiazide (Hctz -)  12.5 mg PO DAILY [facility]


Ceftriaxone Sodium 1 gm/ (Dextrose)  50 mls @ 100 mls/hr IVPB DAILY [facility]


   Last Admin: 01/28/20 09:48 Dose:  100 mls/hr


Azithromycin (Zithromax 500mg Ivpb (Pre-Docked))  500 mg in 250 mls @ 250 mls/

hr IVPB DAILY [facility]


   Last Admin: 01/28/20 09:48 Dose:  250 mls/hr


Levothyroxine Sodium (Synthroid -)  150 mcg PO AM [facility]


   Last Admin: 01/28/20 07:39 Dose:  150 mcg


Losartan Potassium (Cozaar -)  50 mg PO DAILY [facility]


Methylprednisolone Sodium Succinate (Solu-Medrol -)  40 mg IVPUSH Q8H-IV [facility]


   Last Admin: 01/28/20 09:48 Dose:  40 mg


Multi-Ingredient Ointment (Zinc Oxide)  1 applic TP BID [facility]


   Last Admin: 01/28/20 10:33 Dose:  1 applic


Nystatin (Nystop Powder -)  1 applic TP TID [facility]


   Last Admin: 01/28/20 07:38 Dose:  1 applic





A/P


URI


Acute COPD Exacerbation


Sepsis


Lactic Acidosis


Acute on Chronic Renal Failure


HTN


Hyperlipidemia


Anemia





-  on empiric antibiotics


-  f/u cultures


-  continue medrol at current dose


-  inhaled bronchodilators


-  O2 to keep Spo2 >90%


-  monitor urine output, creatinine


-  DVT prophylaxis





Problem List





- Problems


(1) COPD with acute exacerbation


Code(s): J44.1 - CHRONIC OBSTRUCTIVE PULMONARY DISEASE W (ACUTE) EXACERBATION

## 2020-01-28 NOTE — EKG
Test Reason : 

Blood Pressure : ***/*** mmHG

Vent. Rate : 101 BPM     Atrial Rate : 101 BPM

   P-R Int : 186 ms          QRS Dur : 152 ms

    QT Int : 392 ms       P-R-T Axes : 054 -58 -10 degrees

   QTc Int : 508 ms

 

SINUS TACHYCARDIA

LEFT AXIS DEVIATION

RIGHT BUNDLE BRANCH BLOCK

ABNORMAL ECG

Confirmed by MD ELZBIETA, AZALEA ([id number]) on 1/28/2020 11:55:50 AM

 

Referred By:  ZAHIRA MATOS           Confirmed By:AZALEA RUFF MD

## 2020-01-29 LAB
ALBUMIN SERPL-MCNC: 2.8 G/DL (ref 3.4–5)
ALP SERPL-CCNC: 84 U/L (ref 45–117)
ALT SERPL-CCNC: 29 U/L (ref 13–61)
ANION GAP SERPL CALC-SCNC: 7 MMOL/L (ref 8–16)
AST SERPL-CCNC: 42 U/L (ref 15–37)
BILIRUB SERPL-MCNC: 0.2 MG/DL (ref 0.2–1)
BUN SERPL-MCNC: 46.9 MG/DL (ref 7–18)
CALCIUM SERPL-MCNC: 8.3 MG/DL (ref 8.5–10.1)
CHLORIDE SERPL-SCNC: 108 MMOL/L (ref 98–107)
CO2 SERPL-SCNC: 27 MMOL/L (ref 21–32)
CREAT SERPL-MCNC: 1.5 MG/DL (ref 0.55–1.3)
DEPRECATED RDW RBC AUTO: 15.7 % (ref 11.6–15.6)
GLUCOSE SERPL-MCNC: 184 MG/DL (ref 74–106)
HCT VFR BLD CALC: 31.6 % (ref 32.4–45.2)
HGB BLD-MCNC: 10.4 GM/DL (ref 10.7–15.3)
MCH RBC QN AUTO: 29.6 PG (ref 25.7–33.7)
MCHC RBC AUTO-ENTMCNC: 32.9 G/DL (ref 32–36)
MCV RBC: 89.8 FL (ref 80–96)
PLATELET # BLD AUTO: 259 K/MM3 (ref 134–434)
PMV BLD: 9.9 FL (ref 7.5–11.1)
POTASSIUM SERPLBLD-SCNC: 4 MMOL/L (ref 3.5–5.1)
PROT SERPL-MCNC: 6.6 G/DL (ref 6.4–8.2)
RBC # BLD AUTO: 3.52 M/MM3 (ref 3.6–5.2)
SODIUM SERPL-SCNC: 142 MMOL/L (ref 136–145)
WBC # BLD AUTO: 12.5 K/MM3 (ref 4–10)

## 2020-01-29 RX ADMIN — NYSTATIN SCH APPLIC: 100000 POWDER TOPICAL at 06:15

## 2020-01-29 RX ADMIN — GUAIFENESIN AND DEXTROMETHORPHAN HYDROBROMIDE SCH ML: 100; 10 SOLUTION ORAL at 18:13

## 2020-01-29 RX ADMIN — CEFTRIAXONE SCH MLS/HR: 1 INJECTION, POWDER, FOR SOLUTION INTRAMUSCULAR; INTRAVENOUS at 09:30

## 2020-01-29 RX ADMIN — METHYLPREDNISOLONE SODIUM SUCCINATE SCH MG: 40 INJECTION, POWDER, FOR SOLUTION INTRAMUSCULAR; INTRAVENOUS at 01:15

## 2020-01-29 RX ADMIN — ZINC OXIDE SCH APPLIC: 200 OINTMENT TOPICAL at 09:31

## 2020-01-29 RX ADMIN — HYDROCHLOROTHIAZIDE SCH MG: 12.5 CAPSULE ORAL at 09:31

## 2020-01-29 RX ADMIN — HEPARIN SODIUM SCH UNIT: 5000 INJECTION, SOLUTION INTRAVENOUS; SUBCUTANEOUS at 09:31

## 2020-01-29 RX ADMIN — GUAIFENESIN AND DEXTROMETHORPHAN HYDROBROMIDE SCH ML: 100; 10 SOLUTION ORAL at 00:27

## 2020-01-29 RX ADMIN — CLOTRIMAZOLE AND BETAMETHASONE DIPROPIONATE SCH APPLIC: 10; .5 CREAM TOPICAL at 21:24

## 2020-01-29 RX ADMIN — GABAPENTIN SCH MG: 300 CAPSULE ORAL at 21:19

## 2020-01-29 RX ADMIN — GUAIFENESIN AND DEXTROMETHORPHAN HYDROBROMIDE SCH ML: 100; 10 SOLUTION ORAL at 13:55

## 2020-01-29 RX ADMIN — ZINC OXIDE SCH APPLIC: 200 OINTMENT TOPICAL at 21:25

## 2020-01-29 RX ADMIN — ANASTROZOLE SCH MG: 1 TABLET, COATED ORAL at 10:26

## 2020-01-29 RX ADMIN — FUROSEMIDE SCH MG: 20 TABLET ORAL at 09:30

## 2020-01-29 RX ADMIN — IPRATROPIUM BROMIDE AND ALBUTEROL SULFATE SCH AMP: .5; 3 SOLUTION RESPIRATORY (INHALATION) at 20:30

## 2020-01-29 RX ADMIN — LEVOTHYROXINE SODIUM SCH MCG: 150 TABLET ORAL at 06:14

## 2020-01-29 RX ADMIN — HEPARIN SODIUM SCH UNIT: 5000 INJECTION, SOLUTION INTRAVENOUS; SUBCUTANEOUS at 21:19

## 2020-01-29 RX ADMIN — GABAPENTIN SCH MG: 300 CAPSULE ORAL at 06:14

## 2020-01-29 RX ADMIN — IPRATROPIUM BROMIDE AND ALBUTEROL SULFATE SCH AMP: .5; 3 SOLUTION RESPIRATORY (INHALATION) at 16:02

## 2020-01-29 RX ADMIN — GUAIFENESIN AND DEXTROMETHORPHAN HYDROBROMIDE SCH ML: 100; 10 SOLUTION ORAL at 06:14

## 2020-01-29 RX ADMIN — AZITHROMYCIN DIHYDRATE SCH MLS/HR: 500 INJECTION, POWDER, LYOPHILIZED, FOR SOLUTION INTRAVENOUS at 09:29

## 2020-01-29 RX ADMIN — METHYLPREDNISOLONE SODIUM SUCCINATE SCH MG: 40 INJECTION, POWDER, FOR SOLUTION INTRAMUSCULAR; INTRAVENOUS at 09:30

## 2020-01-29 RX ADMIN — GABAPENTIN SCH MG: 300 CAPSULE ORAL at 13:55

## 2020-01-29 RX ADMIN — GUAIFENESIN AND DEXTROMETHORPHAN HYDROBROMIDE SCH ML: 100; 10 SOLUTION ORAL at 21:20

## 2020-01-29 RX ADMIN — IPRATROPIUM BROMIDE AND ALBUTEROL SULFATE SCH AMP: .5; 3 SOLUTION RESPIRATORY (INHALATION) at 12:00

## 2020-01-29 RX ADMIN — IPRATROPIUM BROMIDE AND ALBUTEROL SULFATE SCH AMP: .5; 3 SOLUTION RESPIRATORY (INHALATION) at 08:07

## 2020-01-29 RX ADMIN — METHYLPREDNISOLONE SODIUM SUCCINATE SCH MG: 40 INJECTION, POWDER, FOR SOLUTION INTRAMUSCULAR; INTRAVENOUS at 18:12

## 2020-01-29 RX ADMIN — ASPIRIN 81 MG SCH MG: 81 TABLET ORAL at 09:30

## 2020-01-29 RX ADMIN — ATORVASTATIN CALCIUM SCH MG: 10 TABLET, FILM COATED ORAL at 21:19

## 2020-01-29 RX ADMIN — GUAIFENESIN AND DEXTROMETHORPHAN HYDROBROMIDE SCH ML: 100; 10 SOLUTION ORAL at 09:32

## 2020-01-29 RX ADMIN — LOSARTAN POTASSIUM SCH MG: 50 TABLET, FILM COATED ORAL at 09:31

## 2020-01-29 NOTE — PN
Progress Note (short form)





- Note


Progress Note: 





Renal follow up for KITTY





Seen and examined at the bedside


awake and alert


no acute complaints


tolerating oral diet 


making urine


 


 Vital Signs











Temperature  97.4 F L  01/29/20 10:00


 


Pulse Rate  90   01/29/20 10:00


 


Respiratory Rate  18   01/29/20 10:00


 


Blood Pressure  155/82   01/29/20 10:00


 


O2 Sat by Pulse Oximetry (%)  95   01/28/20 21:00








 Intake & Output











 01/26/20 01/27/20 01/28/20 01/29/20





 23:59 23:59 23:59 23:59


 


Intake Total 1600 850 905 130


 


Output Total 600 0883 971 1911


 


Balance 1000 -750 205 -870


 


Weight 117.934 kg  109.769 kg 109.86 kg











NAD


awake and alert


RRR, no M/R


bilateral air entry, slight wheeze 


soft NT/ND, no guarding


+ LE edema, mild erythema 


 CBC, BMP





 01/29/20 05:35 





 01/29/20 05:35 





 Current Medications





Acetaminophen (Tylenol -)  650 mg PO Q6H PRN


   PRN Reason: MODERATE PAIN (4-7)


Albuterol Sulfate (Ventolin 0.083% Nebulizer Soln -)  1 amp NEB Q4H PRN


   PRN Reason: SHORT OF BREATH/WHEEZING


Albuterol/Ipratropium (Duoneb -)  1 amp NEB RQID [facility]


   Last Admin: 01/29/20 08:07 Dose:  1 amp


Anastrozole (Arimidex -)  1 mg PO DAILY [facility]


   Last Admin: 01/29/20 10:26 Dose:  1 mg


Aspirin (Asa -)  81 mg PO DAILY [facility]


   Last Admin: 01/29/20 09:30 Dose:  81 mg


Atorvastatin Calcium (Lipitor -)  10 mg PO HS [facility]


   Last Admin: 01/28/20 21:11 Dose:  10 mg


Furosemide (Lasix -)  20 mg PO DAILY [facility]


   Last Admin: 01/29/20 09:30 Dose:  20 mg


Gabapentin (Neurontin -)  300 mg PO TID [facility]


   Last Admin: 01/29/20 06:14 Dose:  300 mg


Guaifenesin (Diabetic Tussin Dm -)  10 ml PO Q4H [facility]


   Last Admin: 01/29/20 09:32 Dose:  10 ml


Heparin Sodium (Porcine) (Heparin -)  5,000 unit SQ BID [facility]


   Last Admin: 01/29/20 09:31 Dose:  5,000 unit


Hydrochlorothiazide (Hctz -)  12.5 mg PO DAILY [facility]


   Last Admin: 01/29/20 09:31 Dose:  12.5 mg


Ceftriaxone Sodium 1 gm/ (Dextrose)  50 mls @ 100 mls/hr IVPB DAILY [facility]


   Last Admin: 01/29/20 09:30 Dose:  100 mls/hr


Azithromycin (Zithromax 500mg Ivpb (Pre-Docked))  500 mg in 250 mls @ 250 mls/

hr IVPB DAILY [facility]


   Last Admin: 01/29/20 09:29 Dose:  250 mls/hr


Levothyroxine Sodium (Synthroid -)  150 mcg PO AM [facility]


   Last Admin: 01/29/20 06:14 Dose:  150 mcg


Losartan Potassium (Cozaar -)  50 mg PO DAILY [facility]


   Last Admin: 01/29/20 09:31 Dose:  50 mg


Methylprednisolone Sodium Succinate (Solu-Medrol -)  40 mg IVPUSH Q8H-IV [facility]


   Last Admin: 01/29/20 09:30 Dose:  40 mg


Multi-Ingredient Ointment (Zinc Oxide)  1 applic TP BID [facility]


   Last Admin: 01/29/20 09:31 Dose:  1 applic


Nystatin (Nystop Powder -)  1 applic TP TID [facility]


   Last Admin: 01/29/20 06:15 Dose:  1 applic














89 year old  woman with history of CKD, hypertension, hyperlipidemia, 

COPD who presented from home with cough and fever x 2 days duration and 

admitted to r/o influenza/URI with KITTY with Cr of 2.3.





1. Acute on chronic renal insufficiency likely due to intravascular volume 

depletion vs. AIN vs. GN (hematuria, porteinuria)


2. CKD stage 3a


3. Multiple bilateral renal cysts


4. Fever


5. COPD


6. Anemia 





Renal function now improved to baseline


FeNa consistent with pre-renal injury and UPCR is < 1


There is no overt electrolyte or acid/base disturbance noted at this time.


Restarted on Lasix and ARB./HCTZ today 


tolerating oral diet 


Pt does not have polycystic kidney disease despite the evidence of cysts on 

both kidneys


continue Abx and steroids as per primary team


Trend renal function and electrolytes daily. 





Kwame Mendenhall DO

## 2020-01-29 NOTE — EKG
Test Reason : 

Blood Pressure : ***/*** mmHG

Vent. Rate : 099 BPM     Atrial Rate : 099 BPM

   P-R Int : 184 ms          QRS Dur : 158 ms

    QT Int : 426 ms       P-R-T Axes : 048 -56 024 degrees

   QTc Int : 546 ms

 

SINUS RHYTHM WITH OCCASIONAL PREMATURE VENTRICULAR COMPLEXES AND 

PREMATURE ATRIAL COMPLEXES

LEFT AXIS DEVIATION

RIGHT BUNDLE BRANCH BLOCK

ABNORMAL ECG

WHEN COMPARED WITH ECG OF 28-JAN-2020 08:58,

PREMATURE VENTRICULAR COMPLEXES ARE NOW PRESENT

PREMATURE ATRIAL COMPLEXES ARE NOW PRESENT

Confirmed by Mervin Head MD ([id number]) on 1/29/2020 11:00:11 AM

 

Referred By: DAYTON DE LOS SANTOS           Confirmed By:Mervin Head MD

## 2020-01-29 NOTE — PN
Progress Note (short form)





- Note


Progress Note: 





PULMONARY





Breathing slightly improving. Less cough and wheezing.





 Vital Signs











 Period  Temp  Pulse  Resp  BP Sys/Spence  Pulse Ox


 


 Last 24 Hr  97.4 F-98.5 F  85-94  18-20  134-186/67-82  95











Gen:  less tachypneic


Heart: RRR


Lung: scattered rhonchi, wheezes


Abd: soft, nontender


Ext: + edema





 CBC, BMP





 01/29/20 05:35 





 01/29/20 05:35 





Active Medications





Acetaminophen (Tylenol -)  650 mg PO Q6H PRN


   PRN Reason: MODERATE PAIN (4-7)


Albuterol Sulfate (Ventolin 0.083% Nebulizer Soln -)  1 amp NEB Q4H PRN


   PRN Reason: SHORT OF BREATH/WHEEZING


Albuterol/Ipratropium (Duoneb -)  1 amp NEB RQID [facility]


   Last Admin: 01/29/20 12:00 Dose:  1 amp


Anastrozole (Arimidex -)  1 mg PO DAILY [facility]


   Last Admin: 01/29/20 10:26 Dose:  1 mg


Aspirin (Asa -)  81 mg PO DAILY [facility]


   Last Admin: 01/29/20 09:30 Dose:  81 mg


Atorvastatin Calcium (Lipitor -)  10 mg PO HS [facility]


   Last Admin: 01/28/20 21:11 Dose:  10 mg


Clotrimazole (Lotrisone Cream (Small Tube))  1 applic TP BID [facility]


Furosemide (Lasix -)  20 mg PO DAILY [facility]


   Last Admin: 01/29/20 09:30 Dose:  20 mg


Gabapentin (Neurontin -)  300 mg PO TID [facility]


   Last Admin: 01/29/20 13:55 Dose:  300 mg


Guaifenesin (Diabetic Tussin Dm -)  10 ml PO Q4H [facility]


   Last Admin: 01/29/20 13:55 Dose:  10 ml


Heparin Sodium (Porcine) (Heparin -)  5,000 unit SQ BID [facility]


   Last Admin: 01/29/20 09:31 Dose:  5,000 unit


Hydrochlorothiazide (Hctz -)  12.5 mg PO DAILY [facility]


   Last Admin: 01/29/20 09:31 Dose:  12.5 mg


Ceftriaxone Sodium 1 gm/ (Dextrose)  50 mls @ 100 mls/hr IVPB DAILY [facility]


   Last Admin: 01/29/20 09:30 Dose:  100 mls/hr


Azithromycin (Zithromax 500mg Ivpb (Pre-Docked))  500 mg in 250 mls @ 250 mls/

hr IVPB DAILY [facility]


   Last Admin: 01/29/20 09:29 Dose:  250 mls/hr


Levothyroxine Sodium (Synthroid -)  150 mcg PO AM [facility]


   Last Admin: 01/29/20 06:14 Dose:  150 mcg


Losartan Potassium (Cozaar -)  50 mg PO DAILY [facility]


   Last Admin: 01/29/20 09:31 Dose:  50 mg


Methylprednisolone Sodium Succinate (Solu-Medrol -)  40 mg IVPUSH Q8H-IV [facility]


   Last Admin: 01/29/20 09:30 Dose:  40 mg


Multi-Ingredient Ointment (Zinc Oxide)  1 applic TP BID [facility]


   Last Admin: 01/29/20 09:31 Dose:  1 applic








A/P


URI


Acute COPD Exacerbation


Sepsis


Lactic Acidosis


Acute on Chronic Renal Failure


HTN


Hyperlipidemia


Anemia





-  on empiric antibiotics


-  continue medrol at current dose


-  inhaled bronchodilators


-  O2 to keep Spo2 >90%


-  monitor urine output, creatinine


-  DVT prophylaxis





Problem List





- Problems


(1) COPD with acute exacerbation


Code(s): J44.1 - CHRONIC OBSTRUCTIVE PULMONARY DISEASE W (ACUTE) EXACERBATION

## 2020-01-29 NOTE — PN
Progress Note (short form)





- Note


Progress Note: 


s: no chest pain, palps, dizziness, dyspnea.  stable cough.


  Current Medications





Acetaminophen (Tylenol -)  650 mg PO Q6H PRN


   PRN Reason: MODERATE PAIN (4-7)


Albuterol Sulfate (Ventolin 0.083% Nebulizer Soln -)  1 amp NEB Q4H PRN


   PRN Reason: SHORT OF BREATH/WHEEZING


Albuterol/Ipratropium (Duoneb -)  1 amp NEB RQID [facility]


   Last Admin: 01/29/20 08:07 Dose:  1 amp


Anastrozole (Arimidex -)  1 mg PO DAILY [facility]


   Last Admin: 01/29/20 10:26 Dose:  1 mg


Aspirin (Asa -)  81 mg PO DAILY [facility]


   Last Admin: 01/29/20 09:30 Dose:  81 mg


Atorvastatin Calcium (Lipitor -)  10 mg PO HS [facility]


   Last Admin: 01/28/20 21:11 Dose:  10 mg


Furosemide (Lasix -)  20 mg PO DAILY [facility]


   Last Admin: 01/29/20 09:30 Dose:  20 mg


Gabapentin (Neurontin -)  300 mg PO TID [facility]


   Last Admin: 01/29/20 06:14 Dose:  300 mg


Guaifenesin (Diabetic Tussin Dm -)  10 ml PO Q4H [facility]


   Last Admin: 01/29/20 09:32 Dose:  10 ml


Heparin Sodium (Porcine) (Heparin -)  5,000 unit SQ BID [facility]


   Last Admin: 01/29/20 09:31 Dose:  5,000 unit


Hydrochlorothiazide (Hctz -)  12.5 mg PO DAILY [facility]


   Last Admin: 01/29/20 09:31 Dose:  12.5 mg


Ceftriaxone Sodium 1 gm/ (Dextrose)  50 mls @ 100 mls/hr IVPB DAILY [facility]


   Last Admin: 01/29/20 09:30 Dose:  100 mls/hr


Azithromycin (Zithromax 500mg Ivpb (Pre-Docked))  500 mg in 250 mls @ 250 mls/

hr IVPB DAILY [facility]


   Last Admin: 01/29/20 09:29 Dose:  250 mls/hr


Levothyroxine Sodium (Synthroid -)  150 mcg PO AM [facility]


   Last Admin: 01/29/20 06:14 Dose:  150 mcg


Losartan Potassium (Cozaar -)  50 mg PO DAILY [facility]


   Last Admin: 01/29/20 09:31 Dose:  50 mg


Methylprednisolone Sodium Succinate (Solu-Medrol -)  40 mg IVPUSH Q8H-IV [facility]


   Last Admin: 01/29/20 09:30 Dose:  40 mg


Multi-Ingredient Ointment (Zinc Oxide)  1 applic TP BID [facility]


   Last Admin: 01/29/20 09:31 Dose:  1 applic


Nystatin (Nystop Powder -)  1 applic TP TID [facility]


   Last Admin: 01/29/20 06:15 Dose:  1 applic





 Vital Signs











 Period  Temp  Pulse  Resp  BP Sys/Spence  Pulse Ox


 


 Last 24 Hr  97.4 F-98.5 F    18-22  127-186/67-82  95














Constitutional: Yes: Well Nourished, No Distress, Obese


Eyes: No: Sclera Icterus


HENT: No: Nasal Congestion


Neck: No: Decreased ROM


Respiratory: Yes: CTA Bilaterally, Wheezes.  No: Accessory Muscle Use, Rales


Gastrointestinal: Yes: Normal Bowel Sounds.  No: Distention, Hepatomegaly, 

Palpable Mass, Tenderness


Cardiovascular: Yes: Regular Rate and Rhythm


JVD: No


Carotid Bruit: No


PMI: Non-Displaced


Heart Sounds: Yes: S1, S2.  No: Gallop


Murmur: No: Systolic Murmur, Diastolic Murmur


Musculoskeletal: Yes: Other (No kyphosis)


Extremities: No: Cool, Cyanosis


Edema: Yes (bilat LE, mild eryth)


Peripheral Pulses: 2+ Left Carotid, 2+ Right Carotid, 2+ Left Doralis Pedis, 2+ 

Right Dorsalis Pedis


Integumentary: No: Jaundice


Neurological: Yes: Alert, Oriented (x3)


Psychiatric: No: Agitated








Echo 2017: nl LV/RV. valve function unremarkable





MPI 2014: no ischemia





ECG x2: probable sinus with frequent APCs. RBBB, LAFB. + long QT. no path q's. 

nonsp ST-Ts--vs priors, the APCs are new, QT previously prolonged





CXR: clear lungs/pleura





tele: sinus, ST








fever:


-CXR clear, UA unremarkable. BCX pending


-? sec to cellulitis, ? a.e. copd--plan per ID





tachycardia:


-ECG's and tele show NSR with APCs, likely triggered by fever


-cont tele for now





prolonged QT:


-not a new finding (present on prior ECG 8/19, > 500 msec then as well).


-? effect of cilostazol (known offender with torsades risk associated) or 

Symbicort (formoterol component can prolong QT)--both meds presently being held


-K, Mag optimized--strict repletion of these parameters


-cont tele for now





KITTY:


-creat 2.8-->1.3 overnight


-baseline 1.4-1.6


-likely sepsis, +/- hypovolemia


-IVF, plan o/w per pmd





copd:


-+ wheezing


-plan per pulm





periph vascular dz, venous ins'y:


-followed by wound center at [place]


-rx'd cilostazol for severe resting LE pains--sx much improved


-holding this med here sec to QT prolongation--monitor for claudication sx's





mult sclerosis:


-per primary

## 2020-01-30 LAB
ANION GAP SERPL CALC-SCNC: 9 MMOL/L (ref 8–16)
BUN SERPL-MCNC: 47.8 MG/DL (ref 7–18)
CALCIUM SERPL-MCNC: 8.6 MG/DL (ref 8.5–10.1)
CHLORIDE SERPL-SCNC: 106 MMOL/L (ref 98–107)
CO2 SERPL-SCNC: 29 MMOL/L (ref 21–32)
CREAT SERPL-MCNC: 1.6 MG/DL (ref 0.55–1.3)
GLUCOSE SERPL-MCNC: 207 MG/DL (ref 74–106)
MAGNESIUM SERPL-MCNC: 2.7 MG/DL (ref 1.8–2.4)
PHOSPHATE SERPL-MCNC: 3.5 MG/DL (ref 2.5–4.9)
POTASSIUM SERPLBLD-SCNC: 3.9 MMOL/L (ref 3.5–5.1)
SODIUM SERPL-SCNC: 144 MMOL/L (ref 136–145)

## 2020-01-30 RX ADMIN — LOSARTAN POTASSIUM SCH MG: 50 TABLET, FILM COATED ORAL at 10:10

## 2020-01-30 RX ADMIN — GUAIFENESIN AND DEXTROMETHORPHAN HYDROBROMIDE SCH ML: 100; 10 SOLUTION ORAL at 23:30

## 2020-01-30 RX ADMIN — ANASTROZOLE SCH MG: 1 TABLET, COATED ORAL at 13:34

## 2020-01-30 RX ADMIN — GABAPENTIN SCH MG: 300 CAPSULE ORAL at 23:30

## 2020-01-30 RX ADMIN — METOPROLOL TARTRATE SCH MG: 25 TABLET, FILM COATED ORAL at 23:30

## 2020-01-30 RX ADMIN — IPRATROPIUM BROMIDE AND ALBUTEROL SULFATE SCH AMP: .5; 3 SOLUTION RESPIRATORY (INHALATION) at 15:54

## 2020-01-30 RX ADMIN — IPRATROPIUM BROMIDE AND ALBUTEROL SULFATE SCH AMP: .5; 3 SOLUTION RESPIRATORY (INHALATION) at 11:30

## 2020-01-30 RX ADMIN — GUAIFENESIN AND DEXTROMETHORPHAN HYDROBROMIDE SCH ML: 100; 10 SOLUTION ORAL at 05:32

## 2020-01-30 RX ADMIN — GABAPENTIN SCH MG: 300 CAPSULE ORAL at 13:34

## 2020-01-30 RX ADMIN — IPRATROPIUM BROMIDE AND ALBUTEROL SULFATE SCH AMP: .5; 3 SOLUTION RESPIRATORY (INHALATION) at 22:20

## 2020-01-30 RX ADMIN — ZINC OXIDE SCH APPLIC: 200 OINTMENT TOPICAL at 23:41

## 2020-01-30 RX ADMIN — HYDROCHLOROTHIAZIDE SCH MG: 12.5 CAPSULE ORAL at 10:10

## 2020-01-30 RX ADMIN — ZINC OXIDE SCH APPLIC: 200 OINTMENT TOPICAL at 10:11

## 2020-01-30 RX ADMIN — FUROSEMIDE SCH MG: 20 TABLET ORAL at 10:10

## 2020-01-30 RX ADMIN — LEVOTHYROXINE SODIUM SCH MCG: 150 TABLET ORAL at 06:22

## 2020-01-30 RX ADMIN — GUAIFENESIN AND DEXTROMETHORPHAN HYDROBROMIDE SCH: 100; 10 SOLUTION ORAL at 09:00

## 2020-01-30 RX ADMIN — IPRATROPIUM BROMIDE AND ALBUTEROL SULFATE SCH AMP: .5; 3 SOLUTION RESPIRATORY (INHALATION) at 07:33

## 2020-01-30 RX ADMIN — METHYLPREDNISOLONE SODIUM SUCCINATE SCH MG: 40 INJECTION, POWDER, FOR SOLUTION INTRAMUSCULAR; INTRAVENOUS at 10:11

## 2020-01-30 RX ADMIN — GABAPENTIN SCH MG: 300 CAPSULE ORAL at 06:21

## 2020-01-30 RX ADMIN — HEPARIN SODIUM SCH UNIT: 5000 INJECTION, SOLUTION INTRAVENOUS; SUBCUTANEOUS at 23:30

## 2020-01-30 RX ADMIN — GUAIFENESIN AND DEXTROMETHORPHAN HYDROBROMIDE SCH: 100; 10 SOLUTION ORAL at 13:33

## 2020-01-30 RX ADMIN — GUAIFENESIN AND DEXTROMETHORPHAN HYDROBROMIDE SCH ML: 100; 10 SOLUTION ORAL at 17:38

## 2020-01-30 RX ADMIN — ASPIRIN 81 MG SCH MG: 81 TABLET ORAL at 10:10

## 2020-01-30 RX ADMIN — METHYLPREDNISOLONE SODIUM SUCCINATE SCH MG: 40 INJECTION, POWDER, FOR SOLUTION INTRAMUSCULAR; INTRAVENOUS at 01:36

## 2020-01-30 RX ADMIN — METHYLPREDNISOLONE SODIUM SUCCINATE SCH MG: 40 INJECTION, POWDER, FOR SOLUTION INTRAMUSCULAR; INTRAVENOUS at 17:38

## 2020-01-30 RX ADMIN — CEFTRIAXONE SCH MLS/HR: 1 INJECTION, POWDER, FOR SOLUTION INTRAMUSCULAR; INTRAVENOUS at 10:11

## 2020-01-30 RX ADMIN — AZITHROMYCIN DIHYDRATE SCH MLS/HR: 500 INJECTION, POWDER, LYOPHILIZED, FOR SOLUTION INTRAVENOUS at 10:11

## 2020-01-30 RX ADMIN — ATORVASTATIN CALCIUM SCH MG: 10 TABLET, FILM COATED ORAL at 23:30

## 2020-01-30 RX ADMIN — GUAIFENESIN AND DEXTROMETHORPHAN HYDROBROMIDE SCH: 100; 10 SOLUTION ORAL at 01:04

## 2020-01-30 RX ADMIN — CLOTRIMAZOLE AND BETAMETHASONE DIPROPIONATE SCH APPLIC: 10; .5 CREAM TOPICAL at 10:11

## 2020-01-30 RX ADMIN — HEPARIN SODIUM SCH UNIT: 5000 INJECTION, SOLUTION INTRAVENOUS; SUBCUTANEOUS at 10:10

## 2020-01-30 RX ADMIN — METOPROLOL TARTRATE SCH MG: 25 TABLET, FILM COATED ORAL at 10:13

## 2020-01-30 RX ADMIN — CLOTRIMAZOLE AND BETAMETHASONE DIPROPIONATE SCH APPLIC: 10; .5 CREAM TOPICAL at 23:40

## 2020-01-30 NOTE — PN
Progress Note, Physician


Chief Complaint: 





dyspnea and wheezing, productive cough


History of Present Illness: 





89 admitted for high fever, shortness of breath and cough . PAtient was started 

on iv antibiotics, and SoluCortef and is subjectively feeling better.


Cough is sporadic. Wheezing persists after cough. 





- Current Medication List


Current Medications: 


Active Medications





Acetaminophen (Tylenol -)  650 mg PO Q6H PRN


   PRN Reason: MODERATE PAIN (4-7)


Albuterol Sulfate (Ventolin 0.083% Nebulizer Soln -)  1 amp NEB Q4H PRN


   PRN Reason: SHORT OF BREATH/WHEEZING


Albuterol/Ipratropium (Duoneb -)  1 amp NEB RQID [facility]


   Last Admin: 01/30/20 15:54 Dose:  1 amp


Anastrozole (Arimidex -)  1 mg PO DAILY [facility]


   Last Admin: 01/30/20 13:34 Dose:  1 mg


Aspirin (Asa -)  81 mg PO DAILY [facility]


   Last Admin: 01/30/20 10:10 Dose:  81 mg


Atorvastatin Calcium (Lipitor -)  10 mg PO HS [facility]


   Last Admin: 01/29/20 21:19 Dose:  10 mg


Clotrimazole (Lotrisone Cream (Small Tube))  1 applic TP BID [facility]


   Last Admin: 01/30/20 10:11 Dose:  1 applic


Furosemide (Lasix -)  20 mg PO DAILY [facility]


   Last Admin: 01/30/20 10:10 Dose:  20 mg


Gabapentin (Neurontin -)  300 mg PO TID [facility]


   Last Admin: 01/30/20 13:34 Dose:  300 mg


Guaifenesin (Diabetic Tussin Dm -)  10 ml PO Q4H [facility]


   Last Admin: 01/30/20 17:38 Dose:  10 ml


Heparin Sodium (Porcine) (Heparin -)  5,000 unit SQ BID [facility]


   Last Admin: 01/30/20 10:10 Dose:  5,000 unit


Hydrochlorothiazide (Hctz -)  12.5 mg PO DAILY [facility]


   Last Admin: 01/30/20 10:10 Dose:  12.5 mg


Ceftriaxone Sodium 1 gm/ (Dextrose)  50 mls @ 100 mls/hr IVPB DAILY [facility]


   Last Admin: 01/30/20 10:11 Dose:  100 mls/hr


Azithromycin (Zithromax 500mg Ivpb (Pre-Docked))  500 mg in 250 mls @ 250 mls/

hr IVPB DAILY [facility]


   Last Admin: 01/30/20 10:11 Dose:  250 mls/hr


Levothyroxine Sodium (Synthroid -)  150 mcg PO AM [facility]


   Last Admin: 01/30/20 06:22 Dose:  150 mcg


Losartan Potassium (Cozaar -)  50 mg PO DAILY [facility]


   Last Admin: 01/30/20 10:10 Dose:  50 mg


Methylprednisolone Sodium Succinate (Solu-Medrol -)  40 mg IVPUSH Q8H-IV [facility]


   Last Admin: 01/30/20 17:38 Dose:  40 mg


Metoprolol Tartrate (Lopressor -)  25 mg PO BID [facility]


   Last Admin: 01/30/20 10:13 Dose:  25 mg


Multi-Ingredient Ointment (Zinc Oxide)  1 applic TP BID [facility]


   Last Admin: 01/30/20 10:11 Dose:  1 applic











- Objective


Vital Signs: 


 Vital Signs











Temperature  98.0 F   01/30/20 17:00


 


Pulse Rate  99 H  01/30/20 17:00


 


Respiratory Rate  20   01/30/20 17:00


 


Blood Pressure  184/88 H  01/30/20 17:00


 


O2 Sat by Pulse Oximetry (%)  96   01/30/20 09:00











Constitutional: Yes: No Distress, Calm


Eyes: Yes: Conjunctiva Clear, EOM Intact


HENT: Yes: Normocephalic


Neck: Yes: Supple, Trachea Midline


Cardiovascular: Yes: Regular Rate and Rhythm


Respiratory: Yes: Regular, CTA Bilaterally


Gastrointestinal: No: Vomiting


Peripheral Pulses WNL: No


Labs: 


 CBC, BMP





 01/29/20 05:35 





 01/30/20 05:30 











Problem List





- Problems


(1) COPD with acute exacerbation


Assessment/Plan: 


continue SoluCortef and Ceftriaxone


Code(s): J44.1 - CHRONIC OBSTRUCTIVE PULMONARY DISEASE W (ACUTE) EXACERBATION   





(2) Chronic kidney failure


Assessment/Plan: 


better, restarted the HCTZ


Code(s): N18.9 - CHRONIC KIDNEY DISEASE, UNSPECIFIED   





(3) Diabetes mellitus type 2 in obese


Assessment/Plan: 


accuchecks  AC and HS with regular coverage


Code(s): E11.69 - TYPE 2 DIABETES MELLITUS WITH OTHER SPECIFIED COMPLICATION; 

E66.9 - OBESITY, UNSPECIFIED

## 2020-01-30 NOTE — PN
Progress Note (short form)





- Note


Progress Note: 





Renal follow up for KITTY





Seen and examined at the bedside


awake and alert


has no acute complaints


shortness of breath not completely resolved


no chest pain, fever, chills


making urine 





 


 Vital Signs











Temperature  98.4 F   01/30/20 10:00


 


Pulse Rate  102 H  01/30/20 10:00


 


Respiratory Rate  22 H  01/30/20 10:00


 


Blood Pressure  161/83   01/30/20 10:00


 


O2 Sat by Pulse Oximetry (%)  96   01/30/20 09:00








 Intake & Output











 01/27/20 01/28/20 01/29/20 01/30/20





 23:59 23:59 23:59 23:59


 


Intake Total 850 905 660 190


 


Output Total 2460 799 3772 800


 


Balance -750 205 -740 -610


 


Weight  109.769 kg 109.769 kg 109.86 kg














NAD


awake and alert


RRR, no M/R


bilateral air entry, slight wheeze 


soft NT/ND, no guarding


+ LE edema


 CBC, BMP





 01/29/20 05:35 





 01/30/20 05:30 





 Current Medications





Acetaminophen (Tylenol -)  650 mg PO Q6H PRN


   PRN Reason: MODERATE PAIN (4-7)


Albuterol Sulfate (Ventolin 0.083% Nebulizer Soln -)  1 amp NEB Q4H PRN


   PRN Reason: SHORT OF BREATH/WHEEZING


Albuterol/Ipratropium (Duoneb -)  1 amp NEB RQID [facility]


   Last Admin: 01/30/20 07:33 Dose:  1 amp


Anastrozole (Arimidex -)  1 mg PO DAILY [facility]


   Last Admin: 01/29/20 10:26 Dose:  1 mg


Aspirin (Asa -)  81 mg PO DAILY [facility]


   Last Admin: 01/30/20 10:10 Dose:  81 mg


Atorvastatin Calcium (Lipitor -)  10 mg PO HS [facility]


   Last Admin: 01/29/20 21:19 Dose:  10 mg


Clotrimazole (Lotrisone Cream (Small Tube))  1 applic TP BID [facility]


   Last Admin: 01/30/20 10:11 Dose:  1 applic


Furosemide (Lasix -)  20 mg PO DAILY [facility]


   Last Admin: 01/30/20 10:10 Dose:  20 mg


Gabapentin (Neurontin -)  300 mg PO TID [facility]


   Last Admin: 01/30/20 06:21 Dose:  300 mg


Guaifenesin (Diabetic Tussin Dm -)  10 ml PO Q4H [facility]


   Last Admin: 01/30/20 05:32 Dose:  10 ml


Heparin Sodium (Porcine) (Heparin -)  5,000 unit SQ BID [facility]


   Last Admin: 01/30/20 10:10 Dose:  5,000 unit


Hydrochlorothiazide (Hctz -)  12.5 mg PO DAILY [facility]


   Last Admin: 01/30/20 10:10 Dose:  12.5 mg


Ceftriaxone Sodium 1 gm/ (Dextrose)  50 mls @ 100 mls/hr IVPB DAILY [facility]


   Last Admin: 01/30/20 10:11 Dose:  100 mls/hr


Azithromycin (Zithromax 500mg Ivpb (Pre-Docked))  500 mg in 250 mls @ 250 mls/

hr IVPB DAILY [facility]


   Last Admin: 01/30/20 10:11 Dose:  250 mls/hr


Levothyroxine Sodium (Synthroid -)  150 mcg PO AM [facility]


   Last Admin: 01/30/20 06:22 Dose:  150 mcg


Losartan Potassium (Cozaar -)  50 mg PO DAILY [facility]


   Last Admin: 01/30/20 10:10 Dose:  50 mg


Methylprednisolone Sodium Succinate (Solu-Medrol -)  40 mg IVPUSH Q8H-IV [facility]


   Last Admin: 01/30/20 10:11 Dose:  40 mg


Metoprolol Tartrate (Lopressor -)  25 mg PO BID [facility]


   Last Admin: 01/30/20 10:13 Dose:  25 mg


Multi-Ingredient Ointment (Zinc Oxide)  1 applic TP BID [facility]


   Last Admin: 01/30/20 10:11 Dose:  1 applic














89 year old  woman with history of CKD, hypertension, hyperlipidemia, 

COPD who presented from home with cough and fever x 2 days duration and 

admitted to r/o influenza/URI with KITTY with Cr of 2.3.





1. Acute on chronic renal insufficiency likely due to intravascular volume 

depletion vs. AIN vs. GN (hematuria, porteinuria)


2. CKD stage 3a


3. Multiple bilateral renal cysts


4. Fever


5. COPD


6. Anemia 





Renal function stable


FeNa consistent with pre-renal injury and UPCR is < 1


There is no overt electrolyte or acid/base disturbance noted at this time.


Restarted on Lasix and ARB./HCTZ. Can consider increasing Lasix dose as pt has 

significant edema likely due to increased fluid retention from steroids


Pt does not have polycystic kidney disease despite the evidence of cysts on 

both kidneys


continue Abx and steroids as per primary team


Trend renal function and electrolytes daily. 





Kwame Mendenhall DO

## 2020-01-30 NOTE — PN
Progress Note (short form)





- Note


Progress Note: 


s: no chest pain, palps, dizziness, dyspnea.  stable cough.


  


 Current Medications











Generic Name Dose Route Start Last Admin





  Trade Name Freq  PRN Reason Stop Dose Admin


 


Acetaminophen  650 mg  01/26/20 18:14  





  Tylenol -  PO   





  Q6H PRN   





  MODERATE PAIN (4-7)   





     





     





     


 


Albuterol Sulfate  1 amp  01/27/20 14:31  





  Ventolin 0.083% Nebulizer Soln -  NEB   





  Q4H PRN   





  SHORT OF BREATH/WHEEZING   





     





     





     


 


Albuterol/Ipratropium  1 amp  01/27/20 16:00  01/30/20 07:33





  Duoneb -  NEB   1 amp





  RQID [facility]   Administration





     





     





     





     


 


Anastrozole  1 mg  01/27/20 10:00  01/29/20 10:26





  Arimidex -  PO   1 mg





  DAILY [facility]   Administration





     





     





     





     


 


Aspirin  81 mg  01/26/20 18:15  01/30/20 10:10





  Asa -  PO   81 mg





  DAILY [facility]   Administration





     





     





     





     


 


Atorvastatin Calcium  10 mg  01/26/20 22:00  01/29/20 21:19





  Lipitor -  PO   10 mg





  HS [facility]   Administration





     





     





     





     


 


Clotrimazole  1 applic  01/29/20 22:00  01/30/20 10:11





  Lotrisone Cream (Small Tube)  TP   1 applic





  BID [facility]   Administration





     





     





     





     


 


Furosemide  20 mg  01/29/20 10:00  01/30/20 10:10





  Lasix -  PO   20 mg





  DAILY [facility]   Administration





     





     





     





     


 


Gabapentin  300 mg  01/26/20 22:00  01/30/20 06:21





  Neurontin -  PO   300 mg





  TID [facility]   Administration





     





     





     





     


 


Guaifenesin  10 ml  01/26/20 21:00  01/30/20 05:32





  Diabetic Tussin Dm -  PO   10 ml





  Q4H [facility]   Administration





     





     





     





     


 


Heparin Sodium (Porcine)  5,000 unit  01/27/20 22:00  01/30/20 10:10





  Heparin -  SQ   5,000 unit





  BID [facility]   Administration





     





     





     





     


 


Hydrochlorothiazide  12.5 mg  01/29/20 10:00  01/30/20 10:10





  Hctz -  PO   12.5 mg





  DAILY [facility]   Administration





     





     





     





     


 


Ceftriaxone Sodium 1 gm/  50 mls @ 100 mls/hr  01/27/20 12:00  01/30/20 10:11





  Dextrose  IVPB   100 mls/hr





  DAILY [facility]   Administration





     





     





     





     


 


Azithromycin  500 mg in 250 mls @ 250 mls/hr  01/27/20 12:00  01/30/20 10:11





  Zithromax 500mg Ivpb (Pre-Docked)  IVPB   250 mls/hr





  DAILY [facility]   Administration





     





     





     





     


 


Levothyroxine Sodium  150 mcg  01/27/20 07:00  01/30/20 06:22





  Synthroid -  PO   150 mcg





  AM [facility]   Administration





     





     





     





     


 


Losartan Potassium  50 mg  01/29/20 10:00  01/30/20 10:10





  Cozaar -  PO   50 mg





  DAILY [facility]   Administration





     





     





     





     


 


Methylprednisolone Sodium Succinate  40 mg  01/27/20 18:00  01/30/20 10:11





  Solu-Medrol -  IVPUSH   40 mg





  Q8H-IV [facility]   Administration





     





     





     





     


 


Metoprolol Tartrate  25 mg  01/30/20 10:00  01/30/20 10:13





  Lopressor -  PO   25 mg





  BID [facility]   Administration





     





     





     





     


 


Multi-Ingredient Ointment  1 applic  01/26/20 22:00  01/30/20 10:11





  Zinc Oxide  TP   1 applic





  BID [facility]   Administration





     





     





     





     








 Vital Signs











 Period  Temp  Pulse  Resp  BP Sys/Spence  Pulse Ox


 


 Last 24 Hr  97.9 F-98.9 F    20-20  162-167/79-90  95











Constitutional: Yes: Well Nourished, No Distress, Obese


Eyes: No: Sclera Icterus


HENT: No: Nasal Congestion


Neck: No: Decreased ROM


Respiratory: Yes:bl Wheezes.  No: Accessory Muscle Use, Rales


Gastrointestinal: Yes: Normal Bowel Sounds.  No: Distention, Hepatomegaly, 

Palpable Mass, Tenderness


Cardiovascular: Yes: Regular Rate and Rhythm


JVD: No


Carotid Bruit: No


PMI: Non-Displaced


Heart Sounds: Yes: S1, S2.  No: Gallop


Murmur: No: Systolic Murmur, Diastolic Murmur


Musculoskeletal: Yes: Other (No kyphosis)


Extremities: No: Cool, Cyanosis


Edema: Yes (trace bilat LE, chronic stasis changes)


Peripheral Pulses: 2+ Left Carotid, 2+ Right Carotid, 2+ Left Doralis Pedis, 2+ 

Right Dorsalis Pedis


Integumentary: No: Jaundice


Neurological: Yes: Alert, Oriented (x3)


Psychiatric: No: Agitated





  CBC, BMP





 01/29/20 05:35 





 01/30/20 05:30 











Echo 2017: nl LV/RV. valve function unremarkable





MPI 2014: no ischemia





ECG x2: probable sinus with frequent APCs. RBBB, LAFB. + long QT. no path q's. 

nonsp ST-Ts--vs priors, the APCs are new, QT previously prolonged





CXR: clear lungs/pleura





tele: sinus, brief PAT








fever:


-CXR clear, UA unremarkable. BCX pending


-? sec to cellulitis, ? a.e. copd--plan per ID





tachycardia:


-tele benign, brief atrial runs, likely triggered by fever


-cont tele for now





prolonged QT:


-not a new finding (present on prior ECG 8/19, > 500 msec then as well).


-? effect of cilostazol (known offender with torsades risk associated) or 

Symbicort (formoterol component can prolong QT)--both meds presently being held


-K, Mag optimized


-cont tele for now





KITTY:


-improved


-baseline 1.4-1.6


-likely sepsis, +/- hypovolemia


-IVF, plan o/w per pmd





copd:


-+ wheezing


-plan per pulm





periph vascular dz, venous ins'y:


-followed by wound center at [place]


-rx'd cilostazol for severe resting LE pains--sx much improved


-holding this med here sec to QT prolongation--monitor for claudication sx's





mult sclerosis:


-per primary





htn:


-likely elevated in setting of steroids.  can increase hctz if needed.

## 2020-01-30 NOTE — PN
Progress Note, Physician


History of Present Illness: 





AWAKE, ALERT IN BED


FEELING BETTER


NO C/O CHEST PAIN/ DYSPNEA/ COUGH


NO C/O LEG PAIN


TEMPS DOWN   AFEBRILE


TOLERATING  CEPHALOSPORIN





- Current Medication List


Current Medications: 


Active Medications





Acetaminophen (Tylenol -)  650 mg PO Q6H PRN


   PRN Reason: MODERATE PAIN (4-7)


Albuterol Sulfate (Ventolin 0.083% Nebulizer Soln -)  1 amp NEB Q4H PRN


   PRN Reason: SHORT OF BREATH/WHEEZING


Albuterol/Ipratropium (Duoneb -)  1 amp NEB RQID [facility]


   Last Admin: 01/30/20 15:54 Dose:  1 amp


Anastrozole (Arimidex -)  1 mg PO DAILY [facility]


   Last Admin: 01/30/20 13:34 Dose:  1 mg


Aspirin (Asa -)  81 mg PO DAILY [facility]


   Last Admin: 01/30/20 10:10 Dose:  81 mg


Atorvastatin Calcium (Lipitor -)  10 mg PO HS [facility]


   Last Admin: 01/29/20 21:19 Dose:  10 mg


Clotrimazole (Lotrisone Cream (Small Tube))  1 applic TP BID [facility]


   Last Admin: 01/30/20 10:11 Dose:  1 applic


Furosemide (Lasix -)  20 mg PO DAILY [facility]


   Last Admin: 01/30/20 10:10 Dose:  20 mg


Gabapentin (Neurontin -)  300 mg PO TID [facility]


   Last Admin: 01/30/20 13:34 Dose:  300 mg


Guaifenesin (Diabetic Tussin Dm -)  10 ml PO Q4H [facility]


   Last Admin: 01/30/20 17:38 Dose:  10 ml


Heparin Sodium (Porcine) (Heparin -)  5,000 unit SQ BID [facility]


   Last Admin: 01/30/20 10:10 Dose:  5,000 unit


Hydrochlorothiazide (Hctz -)  12.5 mg PO DAILY [facility]


   Last Admin: 01/30/20 10:10 Dose:  12.5 mg


Ceftriaxone Sodium 1 gm/ (Dextrose)  50 mls @ 100 mls/hr IVPB DAILY [facility]


   Last Admin: 01/30/20 10:11 Dose:  100 mls/hr


Azithromycin (Zithromax 500mg Ivpb (Pre-Docked))  500 mg in 250 mls @ 250 mls/

hr IVPB DAILY [facility]


   Last Admin: 01/30/20 10:11 Dose:  250 mls/hr


Levothyroxine Sodium (Synthroid -)  150 mcg PO AM [facility]


   Last Admin: 01/30/20 06:22 Dose:  150 mcg


Losartan Potassium (Cozaar -)  50 mg PO DAILY [facility]


   Last Admin: 01/30/20 10:10 Dose:  50 mg


Methylprednisolone Sodium Succinate (Solu-Medrol -)  40 mg IVPUSH Q8H-IV [facility]


   Last Admin: 01/30/20 17:38 Dose:  40 mg


Metoprolol Tartrate (Lopressor -)  25 mg PO BID [facility]


   Last Admin: 01/30/20 10:13 Dose:  25 mg


Multi-Ingredient Ointment (Zinc Oxide)  1 applic TP BID [facility]


   Last Admin: 01/30/20 10:11 Dose:  1 applic











- Objective


Vital Signs: 


 Vital Signs











Temperature  98.0 F   01/30/20 17:00


 


Pulse Rate  99 H  01/30/20 17:00


 


Respiratory Rate  20   01/30/20 17:00


 


Blood Pressure  184/88 H  01/30/20 17:00


 


O2 Sat by Pulse Oximetry (%)  96   01/30/20 09:00











Constitutional: Yes: No Distress


Eyes: Yes: Conjunctiva Clear


Cardiovascular: Yes: Regular Rate and Rhythm, S1, S2


Respiratory: Yes: Rhonchi


Gastrointestinal: Yes: Normal Bowel Sounds, Soft


Edema: Yes


Integumentary: Yes: Other (CHRONIC LE STASIS DERMATITIS    NO WARMTH)


Labs: 


 CBC, BMP





 01/29/20 05:35 





 01/30/20 05:30 











Assessment/Plan





URI


EXACERBATION COPD


? CELLULITIS LE


PCN ALLERGY


CONTINUE EMPIRIC CEFTRIAXONE/ ZITHROMAX

## 2020-01-31 LAB
ANION GAP SERPL CALC-SCNC: 8 MMOL/L (ref 8–16)
BUN SERPL-MCNC: 45.1 MG/DL (ref 7–18)
CALCIUM SERPL-MCNC: 8.6 MG/DL (ref 8.5–10.1)
CHLORIDE SERPL-SCNC: 103 MMOL/L (ref 98–107)
CO2 SERPL-SCNC: 30 MMOL/L (ref 21–32)
CREAT SERPL-MCNC: 1.6 MG/DL (ref 0.55–1.3)
GLUCOSE SERPL-MCNC: 187 MG/DL (ref 74–106)
MAGNESIUM SERPL-MCNC: 2.6 MG/DL (ref 1.8–2.4)
POTASSIUM SERPLBLD-SCNC: 4 MMOL/L (ref 3.5–5.1)
SODIUM SERPL-SCNC: 141 MMOL/L (ref 136–145)

## 2020-01-31 RX ADMIN — DILTIAZEM HYDROCHLORIDE SCH MG: 30 TABLET, FILM COATED ORAL at 17:43

## 2020-01-31 RX ADMIN — GUAIFENESIN AND DEXTROMETHORPHAN HYDROBROMIDE SCH ML: 100; 10 SOLUTION ORAL at 11:02

## 2020-01-31 RX ADMIN — ATORVASTATIN CALCIUM SCH MG: 10 TABLET, FILM COATED ORAL at 21:52

## 2020-01-31 RX ADMIN — GABAPENTIN SCH MG: 300 CAPSULE ORAL at 07:06

## 2020-01-31 RX ADMIN — IPRATROPIUM BROMIDE AND ALBUTEROL SULFATE SCH AMP: .5; 3 SOLUTION RESPIRATORY (INHALATION) at 16:00

## 2020-01-31 RX ADMIN — AZITHROMYCIN DIHYDRATE SCH MLS/HR: 500 INJECTION, POWDER, LYOPHILIZED, FOR SOLUTION INTRAVENOUS at 12:13

## 2020-01-31 RX ADMIN — GUAIFENESIN AND DEXTROMETHORPHAN HYDROBROMIDE SCH ML: 100; 10 SOLUTION ORAL at 21:52

## 2020-01-31 RX ADMIN — METHYLPREDNISOLONE SODIUM SUCCINATE SCH MG: 40 INJECTION, POWDER, FOR SOLUTION INTRAMUSCULAR; INTRAVENOUS at 02:30

## 2020-01-31 RX ADMIN — ZINC OXIDE SCH: 200 OINTMENT TOPICAL at 11:03

## 2020-01-31 RX ADMIN — HEPARIN SODIUM SCH UNIT: 5000 INJECTION, SOLUTION INTRAVENOUS; SUBCUTANEOUS at 11:02

## 2020-01-31 RX ADMIN — METHYLPREDNISOLONE SODIUM SUCCINATE SCH MG: 40 INJECTION, POWDER, FOR SOLUTION INTRAMUSCULAR; INTRAVENOUS at 17:43

## 2020-01-31 RX ADMIN — IPRATROPIUM BROMIDE AND ALBUTEROL SULFATE SCH AMP: .5; 3 SOLUTION RESPIRATORY (INHALATION) at 08:00

## 2020-01-31 RX ADMIN — DILTIAZEM HYDROCHLORIDE SCH MG: 30 TABLET, FILM COATED ORAL at 12:13

## 2020-01-31 RX ADMIN — ASPIRIN 81 MG SCH MG: 81 TABLET ORAL at 11:02

## 2020-01-31 RX ADMIN — LOSARTAN POTASSIUM SCH MG: 50 TABLET, FILM COATED ORAL at 11:02

## 2020-01-31 RX ADMIN — IPRATROPIUM BROMIDE AND ALBUTEROL SULFATE SCH AMP: .5; 3 SOLUTION RESPIRATORY (INHALATION) at 20:06

## 2020-01-31 RX ADMIN — METOPROLOL TARTRATE SCH MG: 25 TABLET, FILM COATED ORAL at 08:36

## 2020-01-31 RX ADMIN — HEPARIN SODIUM SCH UNIT: 5000 INJECTION, SOLUTION INTRAVENOUS; SUBCUTANEOUS at 21:52

## 2020-01-31 RX ADMIN — IPRATROPIUM BROMIDE AND ALBUTEROL SULFATE SCH AMP: .5; 3 SOLUTION RESPIRATORY (INHALATION) at 11:45

## 2020-01-31 RX ADMIN — ZINC OXIDE SCH: 200 OINTMENT TOPICAL at 21:53

## 2020-01-31 RX ADMIN — GUAIFENESIN AND DEXTROMETHORPHAN HYDROBROMIDE SCH ML: 100; 10 SOLUTION ORAL at 17:43

## 2020-01-31 RX ADMIN — METHYLPREDNISOLONE SODIUM SUCCINATE SCH MG: 40 INJECTION, POWDER, FOR SOLUTION INTRAMUSCULAR; INTRAVENOUS at 11:03

## 2020-01-31 RX ADMIN — GUAIFENESIN AND DEXTROMETHORPHAN HYDROBROMIDE SCH ML: 100; 10 SOLUTION ORAL at 15:38

## 2020-01-31 RX ADMIN — LEVOTHYROXINE SODIUM SCH MCG: 150 TABLET ORAL at 07:08

## 2020-01-31 RX ADMIN — CLOTRIMAZOLE AND BETAMETHASONE DIPROPIONATE SCH: 10; .5 CREAM TOPICAL at 11:03

## 2020-01-31 RX ADMIN — GUAIFENESIN AND DEXTROMETHORPHAN HYDROBROMIDE SCH ML: 100; 10 SOLUTION ORAL at 07:05

## 2020-01-31 RX ADMIN — GABAPENTIN SCH MG: 300 CAPSULE ORAL at 15:38

## 2020-01-31 RX ADMIN — ANASTROZOLE SCH MG: 1 TABLET, COATED ORAL at 11:02

## 2020-01-31 RX ADMIN — GUAIFENESIN AND DEXTROMETHORPHAN HYDROBROMIDE SCH: 100; 10 SOLUTION ORAL at 02:49

## 2020-01-31 RX ADMIN — GABAPENTIN SCH MG: 300 CAPSULE ORAL at 21:52

## 2020-01-31 RX ADMIN — CLOTRIMAZOLE AND BETAMETHASONE DIPROPIONATE SCH: 10; .5 CREAM TOPICAL at 21:54

## 2020-01-31 RX ADMIN — CEFTRIAXONE SCH MLS/HR: 1 INJECTION, POWDER, FOR SOLUTION INTRAMUSCULAR; INTRAVENOUS at 11:03

## 2020-01-31 NOTE — PN
Progress Note, Physician


History of Present Illness: 





AWAKE, BUT LETHARGIC IN BED


REPORTS EPISODES OF DYSPNEA TODAY


NO C/O LEG PAIN


AFEBRILE


TOLERATING  CEPHALOSPORIN





- Current Medication List


Current Medications: 


Active Medications





Acetaminophen (Tylenol -)  650 mg PO Q6H PRN


   PRN Reason: MODERATE PAIN (4-7)


Albuterol Sulfate (Ventolin 0.083% Nebulizer Soln -)  1 amp NEB Q4H PRN


   PRN Reason: SHORT OF BREATH/WHEEZING


Albuterol/Ipratropium (Duoneb -)  1 amp NEB RQID [facility]


   Last Admin: 01/31/20 11:45 Dose:  1 amp


Anastrozole (Arimidex -)  1 mg PO DAILY [facility]


   Last Admin: 01/31/20 11:02 Dose:  1 mg


Aspirin (Asa -)  81 mg PO DAILY [facility]


   Last Admin: 01/31/20 11:02 Dose:  81 mg


Atorvastatin Calcium (Lipitor -)  10 mg PO HS [facility]


   Last Admin: 01/30/20 23:30 Dose:  10 mg


Clotrimazole (Lotrisone Cream (Small Tube))  1 applic TP BID [facility]


   Last Admin: 01/31/20 11:03 Dose:  Not Given


Diltiazem HCl (Cardizem -)  30 mg PO Q6HPO [facility]


   Last Admin: 01/31/20 12:13 Dose:  30 mg


Furosemide (Lasix Injection -)  40 mg IVPUSH DAILY [facility]


Gabapentin (Neurontin -)  300 mg PO TID [facility]


   Last Admin: 01/31/20 15:38 Dose:  300 mg


Guaifenesin (Diabetic Tussin Dm -)  10 ml PO Q4H [facility]


   Last Admin: 01/31/20 15:38 Dose:  10 ml


Heparin Sodium (Porcine) (Heparin -)  5,000 unit SQ BID [facility]


   Last Admin: 01/31/20 11:02 Dose:  5,000 unit


Hydrochlorothiazide (Hctz -)  12.5 mg PO DAILY [facility]


   Last Admin: 01/30/20 10:10 Dose:  12.5 mg


Ceftriaxone Sodium 1 gm/ (Dextrose)  50 mls @ 100 mls/hr IVPB DAILY [facility]


   Last Admin: 01/31/20 11:03 Dose:  100 mls/hr


Azithromycin (Zithromax 500mg Ivpb (Pre-Docked))  500 mg in 250 mls @ 250 mls/

hr IVPB DAILY [facility]


   Last Admin: 01/31/20 12:13 Dose:  250 mls/hr


Levothyroxine Sodium (Synthroid -)  150 mcg PO AM [facility]


   Last Admin: 01/31/20 07:08 Dose:  150 mcg


Losartan Potassium (Cozaar -)  50 mg PO DAILY [facility]


   Last Admin: 01/31/20 11:02 Dose:  50 mg


Methylprednisolone Sodium Succinate (Solu-Medrol -)  40 mg IVPUSH Q8H-IV [facility]


   Last Admin: 01/31/20 11:03 Dose:  40 mg


Multi-Ingredient Ointment (Zinc Oxide)  1 applic TP BID [facility]


   Last Admin: 01/31/20 11:03 Dose:  Not Given











- Objective


Vital Signs: 


 Vital Signs











Temperature  98.5 F   01/31/20 14:20


 


Pulse Rate  85   01/31/20 14:20


 


Respiratory Rate  22 H  01/31/20 14:20


 


Blood Pressure  138/87   01/31/20 14:20


 


O2 Sat by Pulse Oximetry (%)  94 L  01/31/20 09:00











Constitutional: Yes: No Distress


Eyes: Yes: Conjunctiva Clear


Cardiovascular: Yes: Regular Rate and Rhythm, S1, S2


Respiratory: Yes: Rhonchi


Gastrointestinal: Yes: Normal Bowel Sounds, Soft, Abdomen, Obese.  No: 

Tenderness


Edema: Yes


Integumentary: Yes: Venous Stasis Changes, Other (NO LE WARMTH)


Labs: 


 CBC, BMP





 01/29/20 05:35 





 01/31/20 05:45 











Assessment/Plan





URI


EXACERBATION COPD


? CELLULITIS LE   IMPROVED 


+ CHRONIC VENOUS STASIS


PCN ALLERGY


CONTINUE EMPIRIC CEFTRIAXONE/ ZITHROMAX

## 2020-01-31 NOTE — EKG
Test Reason : 

Blood Pressure : ***/*** mmHG

Vent. Rate : 112 BPM     Atrial Rate : 112 BPM

   P-R Int : 180 ms          QRS Dur : 144 ms

    QT Int : 354 ms       P-R-T Axes : 046 -57 -03 degrees

   QTc Int : 483 ms

 

SINUS TACHYCARDIA WITH PREMATURE ATRIAL COMPLEXES

LEFT AXIS DEVIATION

RIGHT BUNDLE BRANCH BLOCK

MINIMAL VOLTAGE CRITERIA FOR LVH, MAY BE NORMAL VARIANT

ABNORMAL ECG

WHEN COMPARED WITH ECG OF 29-JAN-2020 09:03,

PREMATURE VENTRICULAR COMPLEXES ARE NO LONGER PRESENT

Confirmed by ANNETTE JULIAN MD ([id number]) on 1/31/2020 3:00:58 PM

 

Referred By:             Confirmed By:ANNETTE JULIAN MD

## 2020-01-31 NOTE — PN
Progress Note, Physician


Chief Complaint: 





Denies CP


Denies palps.








TELE: SR, APCs. Runs of likely ATach- self limited- seem to correlate with 

periods of relative drops in O2


History of Present Illness: 





CXR worse, given IV Lasix





- Current Medication List


Current Medications: 


Active Medications





Acetaminophen (Tylenol -)  650 mg PO Q6H PRN


   PRN Reason: MODERATE PAIN (4-7)


Albuterol Sulfate (Ventolin 0.083% Nebulizer Soln -)  1 amp NEB Q4H PRN


   PRN Reason: SHORT OF BREATH/WHEEZING


Albuterol/Ipratropium (Duoneb -)  1 amp NEB RQID [facility]


   Last Admin: 01/31/20 08:00 Dose:  1 amp


Anastrozole (Arimidex -)  1 mg PO DAILY [facility]


   Last Admin: 01/30/20 13:34 Dose:  1 mg


Aspirin (Asa -)  81 mg PO DAILY [facility]


   Last Admin: 01/30/20 10:10 Dose:  81 mg


Atorvastatin Calcium (Lipitor -)  10 mg PO HS [facility]


   Last Admin: 01/30/20 23:30 Dose:  10 mg


Clotrimazole (Lotrisone Cream (Small Tube))  1 applic TP BID [facility]


   Last Admin: 01/30/20 23:40 Dose:  1 applic


Furosemide (Lasix -)  20 mg PO DAILY [facility]


   Last Admin: 01/30/20 10:10 Dose:  20 mg


Gabapentin (Neurontin -)  300 mg PO TID [facility]


   Last Admin: 01/31/20 07:06 Dose:  300 mg


Guaifenesin (Diabetic Tussin Dm -)  10 ml PO Q4H [facility]


   Last Admin: 01/31/20 07:05 Dose:  10 ml


Heparin Sodium (Porcine) (Heparin -)  5,000 unit SQ BID [facility]


   Last Admin: 01/30/20 23:30 Dose:  5,000 unit


Hydrochlorothiazide (Hctz -)  12.5 mg PO DAILY [facility]


   Last Admin: 01/30/20 10:10 Dose:  12.5 mg


Ceftriaxone Sodium 1 gm/ (Dextrose)  50 mls @ 100 mls/hr IVPB DAILY [facility]


   Last Admin: 01/30/20 10:11 Dose:  100 mls/hr


Azithromycin (Zithromax 500mg Ivpb (Pre-Docked))  500 mg in 250 mls @ 250 mls/

hr IVPB DAILY [facility]


   Last Admin: 01/30/20 10:11 Dose:  250 mls/hr


Levothyroxine Sodium (Synthroid -)  150 mcg PO AM [facility]


   Last Admin: 01/31/20 07:08 Dose:  150 mcg


Losartan Potassium (Cozaar -)  50 mg PO DAILY [facility]


   Last Admin: 01/30/20 10:10 Dose:  50 mg


Methylprednisolone Sodium Succinate (Solu-Medrol -)  40 mg IVPUSH Q8H-IV [facility]


   Last Admin: 01/31/20 02:30 Dose:  40 mg


Metoprolol Tartrate (Lopressor -)  50 mg PO BID [facility]


Multi-Ingredient Ointment (Zinc Oxide)  1 applic TP BID [facility]


   Last Admin: 01/30/20 23:41 Dose:  1 applic











- Objective


Vital Signs: 


 Vital Signs











Temperature  97.5 F L  01/31/20 01:00


 


Pulse Rate  88   01/31/20 05:00


 


Respiratory Rate  24 H  01/31/20 05:00


 


Blood Pressure  192/102 H  01/31/20 05:00


 


O2 Sat by Pulse Oximetry (%)  96   01/30/20 21:00











Constitutional: Yes: No Distress


Cardiovascular: Yes: Regular Rate and Rhythm


Respiratory: Yes: Rhonchi (no active wheezing, no rales.)


Gastrointestinal: Yes: Soft


Edema: No


Neurological: Yes: Alert


Labs: 


 CBC, BMP





 01/29/20 05:35 





 01/31/20 05:45 





 Laboratory Tests











  01/29/20 01/31/20





  05:35 05:45


 


WBC  12.5 H 


 


Hgb  10.4 L 


 


Plt Count  259 


 


Sodium   141


 


Potassium   4.0


 


BUN   45.1 H


 


Creatinine   1.6 H


 


Magnesium   2.6 H














- ....Imaging


EKG: Image Reviewed





Assessment/Plan





Echo 2017: nl LV/RV. valve function unremarkable





MPI 2014: no ischemia





ECG x2: probable sinus with frequent APCs. RBBB, LAFB. + long QT. no path q's. 

nonsp ST-Ts--vs priors, the APCs are new, QT previously prolonged





CXR: clear lungs/pleura





tele: sinus, brief PAT








fever:


-CXR clear, UA unremarkable. BCX pending


-? sec to cellulitis, ? a.e. copd--plan per ID





tachycardia:


-tele benign, brief atrial runs, likely triggered by fever and relative drops 

in O2


-cont tele for now


-Agree with beta blocker trial





prolonged QT:


-not a new finding (present on prior ECG 8/19, > 500 msec then as well).


-? effect of cilostazol (known offender with torsades risk associated) or 

Symbicort (formoterol component can prolong QT)--both meds presently being held


-K, Mag optimized


-cont tele for now








CHF: Volume overloaded.


-Agree with IV Lasix.


-Daily BMP








KITTY:


-improved


-baseline 1.4-1.6


-likely sepsis, +/- hypovolemia








copd:


-+ wheezing


-plan per pulm





periph vascular dz, venous ins'y:


-followed by wound center at [place]


-rx'd cilostazol for severe resting LE pains--sx much improved


-holding this med here sec to QT prolongation--monitor for claudication sx's





mult sclerosis:


-per primary





htn:


-likely elevated in setting of steroids.  can increase hctz if needed.

## 2020-01-31 NOTE — PN
Progress Note (short form)





- Note


Progress Note: 





Renal follow up for KITTY





Seen and examined at the bedside


mornings events noted


currently she feels better


no overt shortness of breath now


was given IV lasix early this morning for congestion and shortness of breath


 





 Vital Signs











Temperature  97.5 F L  01/31/20 01:00


 


Pulse Rate  140 H  01/31/20 09:00


 


Respiratory Rate  22 H  01/31/20 09:00


 


Blood Pressure  161/69   01/31/20 09:00


 


O2 Sat by Pulse Oximetry (%)  94 L  01/31/20 09:00








 Intake & Output











 01/28/20 01/29/20 01/30/20 01/31/20





 23:59 23:59 23:59 23:59


 


Intake Total 905 660 490 100


 


Output Total 700 1400 1700 


 


Balance 205 -740 -1210 100


 


Weight 109.769 kg 109.769 kg 109.86 kg 











NAD


awake and alert


RRR, no M/R


bilateral air entry, slight wheeze 


soft NT/ND, no guarding


+ LE edema





 CBC, BMP





 01/29/20 05:35 





 01/31/20 05:45 





 Current Medications





Acetaminophen (Tylenol -)  650 mg PO Q6H PRN


   PRN Reason: MODERATE PAIN (4-7)


Albuterol Sulfate (Ventolin 0.083% Nebulizer Soln -)  1 amp NEB Q4H PRN


   PRN Reason: SHORT OF BREATH/WHEEZING


Albuterol/Ipratropium (Duoneb -)  1 amp NEB RQID [facility]


   Last Admin: 01/31/20 11:45 Dose:  1 amp


Anastrozole (Arimidex -)  1 mg PO DAILY [facility]


   Last Admin: 01/31/20 11:02 Dose:  1 mg


Aspirin (Asa -)  81 mg PO DAILY [facility]


   Last Admin: 01/31/20 11:02 Dose:  81 mg


Atorvastatin Calcium (Lipitor -)  10 mg PO HS [facility]


   Last Admin: 01/30/20 23:30 Dose:  10 mg


Clotrimazole (Lotrisone Cream (Small Tube))  1 applic TP BID [facility]


   Last Admin: 01/31/20 11:03 Dose:  Not Given


Diltiazem HCl (Cardizem -)  30 mg PO Q6HPO [facility]


   Last Admin: 01/31/20 12:13 Dose:  30 mg


Furosemide (Lasix Injection -)  40 mg IVPUSH DAILY [facility]


Gabapentin (Neurontin -)  300 mg PO TID [facility]


   Last Admin: 01/31/20 07:06 Dose:  300 mg


Guaifenesin (Diabetic Tussin Dm -)  10 ml PO Q4H [facility]


   Last Admin: 01/31/20 11:02 Dose:  10 ml


Heparin Sodium (Porcine) (Heparin -)  5,000 unit SQ BID [facility]


   Last Admin: 01/31/20 11:02 Dose:  5,000 unit


Hydrochlorothiazide (Hctz -)  12.5 mg PO DAILY [facility]


   Last Admin: 01/30/20 10:10 Dose:  12.5 mg


Ceftriaxone Sodium 1 gm/ (Dextrose)  50 mls @ 100 mls/hr IVPB DAILY [facility]


   Last Admin: 01/31/20 11:03 Dose:  100 mls/hr


Azithromycin (Zithromax 500mg Ivpb (Pre-Docked))  500 mg in 250 mls @ 250 mls/

hr IVPB DAILY [facility]


   Last Admin: 01/31/20 12:13 Dose:  250 mls/hr


Levothyroxine Sodium (Synthroid -)  150 mcg PO AM [facility]


   Last Admin: 01/31/20 07:08 Dose:  150 mcg


Losartan Potassium (Cozaar -)  50 mg PO DAILY [facility]


   Last Admin: 01/31/20 11:02 Dose:  50 mg


Methylprednisolone Sodium Succinate (Solu-Medrol -)  40 mg IVPUSH Q8H-IV [facility]


   Last Admin: 01/31/20 11:03 Dose:  40 mg


Multi-Ingredient Ointment (Zinc Oxide)  1 applic TP BID [facility]


   Last Admin: 01/31/20 11:03 Dose:  Not Given











89 year old  woman with history of CKD, hypertension, hyperlipidemia, 

COPD who presented from home with cough and fever x 2 days duration and 

admitted to r/o influenza/URI with KITTY with Cr of 2.3.





1. Acute on chronic renal insufficiency likely due to intravascular volume 

depletion vs. AIN vs. GN (hematuria, porteinuria)


2. CKD stage 3a


3. Multiple bilateral renal cysts


4. Fever


5. COPD


6. Anemia 





Renal function stable


Noted CXR findings of increased congestion. Given IV lasix this am. Will 

continue Lasix 40mg IV once daily 


Started on Cardizem for HR control as per Cardiology


Trend BP (better this afternoon)


Trend renal function and electrolytes daily. 





Kwame Mendenhall DO

## 2020-01-31 NOTE — ECHO
______________________________________________________________________________



Name: SACKS, DIANA                                     Exam:Adult Echocardiogram

MRN: [id number]         Study Date: 01/31/2020 01:08 PM

Age: 89 yrs

______________________________________________________________________________



Reason For Study: arrythmia

Height: 65 in        Weight: 242 lb        BSA: 2.1 m2



______________________________________________________________________________





______________________________________________________________________________

Procedure

The study was technically difficult with many images being suboptimal in quality.

Left Ventricle

Left ventricular systolic function is borderline reduced. Ejection Fraction = 50%. The transmitral sp
ectral

Doppler flow pattern is suggestive of impaired LV relaxation. Septal motion is consistent with conduc
tion

abnormality. There is mild lateral wall hypokinesis.

Right Ventricle

The right ventricle is grossly normal size. The right ventricular systolic function is grossly normal
.

Atria

The left atrium is mildly dilated. Right atrium is small.

Mitral Valve

There is mild mitral valve thickening. There is moderate mitral annular calcification. There is no mi
tral

valve stenosis. There is mild to moderate mitral regurgitation.

Tricuspid Valve

The tricuspid valve is normal in structure and function. There is mild tricuspid regurgitation. Right


ventricular systolic pressure is elevated at 30-40mmHg.

Aortic Valve

There is mild aortic sclerosis.;. No hemodynamically significant valvular aortic stenosis. No aortic

regurgitation is present.

Pulmonic Valve

The pulmonic valve is not well seen, but is grossly normal. There is no pulmonic valvular stenosis.

Great Vessels

The aortic root is normal size.

Pericardium/Pleura

There is no pericardial effusion.



______________________________________________________________________________



Interpretation Summary

The study was technically difficult with many images being suboptimal in quality.

Septal motion is consistent with conduction abnormality.

There is mild lateral wall hypokinesis.

Left ventricular systolic function is borderline reduced.

Ejection Fraction = 50%.

The transmitral spectral Doppler flow pattern is suggestive of impaired LV relaxation.

The left atrium is mildly dilated.

There is mild mitral valve thickening.

There is moderate mitral annular calcification.

There is mild to moderate mitral regurgitation.

There is mild tricuspid regurgitation.

Right ventricular systolic pressure is elevated at 30-40mmHg.

There is mild aortic sclerosis.;

There is no pericardial effusion.





MD Orta *Diana 01/31/2020 02:41 PM

## 2020-01-31 NOTE — PN
Progress Note, Physician


Chief Complaint: 


increased dyspnea with respiratory distress,  HR of 130 upon waking up this am 

and 160 b/min post nebulizer treatment


History of Present Illness: 





89 admitted for high fever, shortness of breath and cough . She is receiving iv 

antibiotics ( Ceftriaxone and Zithromax), and SoluCortef . Metoprolol was 

started yesterday for HR control. She had an uneventful night but early this am 

developed tachycardia at 130 b/min, with an increase of the HR  to 160 b/min 

after administration of the nebulizer treatment.. 








- Current Medication List


Current Medications: 


Active Medications





Acetaminophen (Tylenol -)  650 mg PO Q6H PRN


   PRN Reason: MODERATE PAIN (4-7)


Albuterol Sulfate (Ventolin 0.083% Nebulizer Soln -)  1 amp NEB Q4H PRN


   PRN Reason: SHORT OF BREATH/WHEEZING


Albuterol/Ipratropium (Duoneb -)  1 amp NEB RQID [facility]


   Last Admin: 01/31/20 08:00 Dose:  1 amp


Anastrozole (Arimidex -)  1 mg PO DAILY [facility]


   Last Admin: 01/30/20 13:34 Dose:  1 mg


Aspirin (Asa -)  81 mg PO DAILY [facility]


   Last Admin: 01/30/20 10:10 Dose:  81 mg


Atorvastatin Calcium (Lipitor -)  10 mg PO HS [facility]


   Last Admin: 01/30/20 23:30 Dose:  10 mg


Clotrimazole (Lotrisone Cream (Small Tube))  1 applic TP BID [facility]


   Last Admin: 01/30/20 23:40 Dose:  1 applic


Furosemide (Lasix -)  20 mg PO DAILY [facility]


   Last Admin: 01/30/20 10:10 Dose:  20 mg


Furosemide (Lasix Injection -)  40 mg IVPUSH ONCE STA


   Stop: 01/31/20 08:49


Gabapentin (Neurontin -)  300 mg PO TID [facility]


   Last Admin: 01/31/20 07:06 Dose:  300 mg


Guaifenesin (Diabetic Tussin Dm -)  10 ml PO Q4H [facility]


   Last Admin: 01/31/20 07:05 Dose:  10 ml


Heparin Sodium (Porcine) (Heparin -)  5,000 unit SQ BID [facility]


   Last Admin: 01/30/20 23:30 Dose:  5,000 unit


Hydrochlorothiazide (Hctz -)  12.5 mg PO DAILY [facility]


   Last Admin: 01/30/20 10:10 Dose:  12.5 mg


Ceftriaxone Sodium 1 gm/ (Dextrose)  50 mls @ 100 mls/hr IVPB DAILY [facility]


   Last Admin: 01/30/20 10:11 Dose:  100 mls/hr


Azithromycin (Zithromax 500mg Ivpb (Pre-Docked))  500 mg in 250 mls @ 250 mls/

hr IVPB DAILY [facility]


   Last Admin: 01/30/20 10:11 Dose:  250 mls/hr


Levothyroxine Sodium (Synthroid -)  150 mcg PO AM [facility]


   Last Admin: 01/31/20 07:08 Dose:  150 mcg


Losartan Potassium (Cozaar -)  50 mg PO DAILY [facility]


   Last Admin: 01/30/20 10:10 Dose:  50 mg


Methylprednisolone Sodium Succinate (Solu-Medrol -)  40 mg IVPUSH Q8H-IV [facility]


   Last Admin: 01/31/20 02:30 Dose:  40 mg


Metoprolol Tartrate (Lopressor -)  25 mg PO BID [facility]


   Last Admin: 01/31/20 08:36 Dose:  25 mg


Multi-Ingredient Ointment (Zinc Oxide)  1 applic TP BID [facility]


   Last Admin: 01/30/20 23:41 Dose:  1 applic











- Objective


Vital Signs: 


 Vital Signs











Temperature  97.5 F L  01/31/20 01:00


 


Pulse Rate  88   01/31/20 05:00


 


Respiratory Rate  24 H  01/31/20 05:00


 


Blood Pressure  192/102 H  01/31/20 05:00


 


O2 Sat by Pulse Oximetry (%)  96   01/30/20 21:00











Constitutional: Yes: Moderate Distress


HENT: Yes: Atraumatic, Normocephalic


Neck: Yes: Supple, Trachea Midline


Cardiovascular: Yes: Tachycardia


Respiratory: Yes: Rales (bilaterally fornm top to bottom associated with 

bilateral wheezing), Wheezes (bilateral)


Gastrointestinal: Yes: Normal Bowel Sounds, Soft, Abdomen, Obese


Extremities: No: Calf Tenderness


Edema: No


Peripheral Pulses WNL: Yes


Neurological: Yes: Alert, Oriented


Psychiatric: Yes: Alert, Oriented


Labs: 


 CBC, BMP





 01/29/20 05:35 





 01/31/20 05:45 











- ....Imaging


Chest X-ray: Other (increased pulmonary edema)





Problem List





- Problems


(1) Arrhythmia


Assessment/Plan: 


sinus tachycardia


increase metoprolol to 50 mg bid if BP allows


ECHO pending





Code(s): I49.9 - CARDIAC ARRHYTHMIA, UNSPECIFIED   





(2) COPD with acute exacerbation


Assessment/Plan: 


continue SoluCortef and Ceftriaxone


Code(s): J44.1 - CHRONIC OBSTRUCTIVE PULMONARY DISEASE W (ACUTE) EXACERBATION   





(3) Chronic kidney failure


Assessment/Plan: 


Lasix 40 mg iv administered


will continue monitoring renal function


Code(s): N18.9 - CHRONIC KIDNEY DISEASE, UNSPECIFIED   





(4) Diabetes mellitus type 2 in obese


Assessment/Plan: 


accuchecks  AC and HS with regular coverage


Code(s): E11.69 - TYPE 2 DIABETES MELLITUS WITH OTHER SPECIFIED COMPLICATION; 

E66.9 - OBESITY, UNSPECIFIED   





(5) Pulmonary edema


Assessment/Plan: 


CXR stat





EKG


Code(s): J81.1 - CHRONIC PULMONARY EDEMA

## 2020-01-31 NOTE — PN
Progress Note (short form)





- Note


Progress Note: 


Still with cough, CONTEH, and some wheezing with exertion. 


No acute events overnight. 








CXR: worse.  Noted she was given lasix. 





 Intake & Output











 01/28/20 01/29/20 01/30/20 01/31/20





 23:59 23:59 23:59 23:59


 


Intake Total 905 660 490 100


 


Output Total 700 1400 1700 


 


Balance 205 -740 -1210 100


 


Weight 242 lb 242 lb 242 lb 3.2 oz 








 Last Vital Signs











Temp Pulse Resp BP Pulse Ox


 


 97.5 F L  88   24 H  192/102 H  94 L


 


 01/31/20 01:00  01/31/20 05:00  01/31/20 09:00  01/31/20 05:00  01/31/20 09:00








Active Medications





Acetaminophen (Tylenol -)  650 mg PO Q6H PRN


   PRN Reason: MODERATE PAIN (4-7)


Albuterol Sulfate (Ventolin 0.083% Nebulizer Soln -)  1 amp NEB Q4H PRN


   PRN Reason: SHORT OF BREATH/WHEEZING


Albuterol/Ipratropium (Duoneb -)  1 amp NEB RQID [facility]


   Last Admin: 01/31/20 11:45 Dose:  1 amp


Anastrozole (Arimidex -)  1 mg PO DAILY [facility]


   Last Admin: 01/31/20 11:02 Dose:  1 mg


Aspirin (Asa -)  81 mg PO DAILY [facility]


   Last Admin: 01/31/20 11:02 Dose:  81 mg


Atorvastatin Calcium (Lipitor -)  10 mg PO HS [facility]


   Last Admin: 01/30/20 23:30 Dose:  10 mg


Clotrimazole (Lotrisone Cream (Small Tube))  1 applic TP BID [facility]


   Last Admin: 01/31/20 11:03 Dose:  Not Given


Diltiazem HCl (Cardizem -)  30 mg PO Q6HPO [facility]


   Last Admin: 01/31/20 12:13 Dose:  30 mg


Furosemide (Lasix -)  20 mg PO DAILY [facility]


   Last Admin: 01/30/20 10:10 Dose:  20 mg


Gabapentin (Neurontin -)  300 mg PO TID [facility]


   Last Admin: 01/31/20 07:06 Dose:  300 mg


Guaifenesin (Diabetic Tussin Dm -)  10 ml PO Q4H [facility]


   Last Admin: 01/31/20 11:02 Dose:  10 ml


Heparin Sodium (Porcine) (Heparin -)  5,000 unit SQ BID [facility]


   Last Admin: 01/31/20 11:02 Dose:  5,000 unit


Hydrochlorothiazide (Hctz -)  12.5 mg PO DAILY [facility]


   Last Admin: 01/30/20 10:10 Dose:  12.5 mg


Ceftriaxone Sodium 1 gm/ (Dextrose)  50 mls @ 100 mls/hr IVPB DAILY [facility]


   Last Admin: 01/31/20 11:03 Dose:  100 mls/hr


Azithromycin (Zithromax 500mg Ivpb (Pre-Docked))  500 mg in 250 mls @ 250 mls/

hr IVPB DAILY [facility]


   Last Admin: 01/31/20 12:13 Dose:  250 mls/hr


Levothyroxine Sodium (Synthroid -)  150 mcg PO AM [facility]


   Last Admin: 01/31/20 07:08 Dose:  150 mcg


Losartan Potassium (Cozaar -)  50 mg PO DAILY [facility]


   Last Admin: 01/31/20 11:02 Dose:  50 mg


Methylprednisolone Sodium Succinate (Solu-Medrol -)  40 mg IVPUSH Q8H-IV [facility]


   Last Admin: 01/31/20 11:03 Dose:  40 mg


Multi-Ingredient Ointment (Zinc Oxide)  1 applic TP BID [facility]


   Last Admin: 01/31/20 11:03 Dose:  Not Given











Gen:  NAD 


Heart: RRR


Lung: Bibasilar rhonchi/rales, wheezes


Abd: soft, nontender


Ext: + edema





 Laboratory Results - last 24 hr











  01/31/20 01/31/20





  05:45 07:07


 


Sodium  141 


 


Potassium  4.0 


 


Chloride  103 


 


Carbon Dioxide  30 


 


Anion Gap  8 


 


BUN  45.1 H 


 


Creatinine  1.6 H 


 


Est GFR (CKD-EPI)AfAm  32.77 


 


Est GFR (CKD-EPI)NonAf  28.27 


 


POC Glucometer   197


 


Random Glucose  187 H 


 


Calcium  8.6 


 


Magnesium  2.6 H 














Problem List





- Problems


(1) COPD with acute exacerbation


Code(s): J44.1 - CHRONIC OBSTRUCTIVE PULMONARY DISEASE W (ACUTE) EXACERBATION   








A/P


URI


Acute COPD Exacerbation


Sepsis


Lactic Acidosis


Acute on Chronic Renal Failure


HTN


Hyperlipidemia


Anemia





-  Lasix 


-  Empiric ABX per ID


-  Medrol 


-  inhaled bronchodilators


-  O2 to keep Spo2 >90%


-  monitor urine output, creatinine


-  DVT prophylaxis





Dr Fritz

## 2020-02-01 LAB
ANION GAP SERPL CALC-SCNC: 7 MMOL/L (ref 8–16)
BUN SERPL-MCNC: 51.8 MG/DL (ref 7–18)
CALCIUM SERPL-MCNC: 8.6 MG/DL (ref 8.5–10.1)
CHLORIDE SERPL-SCNC: 102 MMOL/L (ref 98–107)
CO2 SERPL-SCNC: 32 MMOL/L (ref 21–32)
CREAT SERPL-MCNC: 1.5 MG/DL (ref 0.55–1.3)
GLUCOSE SERPL-MCNC: 263 MG/DL (ref 74–106)
MAGNESIUM SERPL-MCNC: 2.6 MG/DL (ref 1.8–2.4)
POTASSIUM SERPLBLD-SCNC: 3.9 MMOL/L (ref 3.5–5.1)
SODIUM SERPL-SCNC: 141 MMOL/L (ref 136–145)

## 2020-02-01 RX ADMIN — GUAIFENESIN AND DEXTROMETHORPHAN HYDROBROMIDE SCH ML: 100; 10 SOLUTION ORAL at 09:27

## 2020-02-01 RX ADMIN — FUROSEMIDE SCH MG: 10 INJECTION, SOLUTION INTRAVENOUS at 09:26

## 2020-02-01 RX ADMIN — ALBUTEROL SULFATE PRN AMP: 2.5 SOLUTION RESPIRATORY (INHALATION) at 21:11

## 2020-02-01 RX ADMIN — IPRATROPIUM BROMIDE AND ALBUTEROL SULFATE SCH AMP: .5; 3 SOLUTION RESPIRATORY (INHALATION) at 20:30

## 2020-02-01 RX ADMIN — HEPARIN SODIUM SCH UNIT: 5000 INJECTION, SOLUTION INTRAVENOUS; SUBCUTANEOUS at 21:12

## 2020-02-01 RX ADMIN — IPRATROPIUM BROMIDE AND ALBUTEROL SULFATE SCH AMP: .5; 3 SOLUTION RESPIRATORY (INHALATION) at 11:50

## 2020-02-01 RX ADMIN — DILTIAZEM HYDROCHLORIDE SCH MG: 30 TABLET, FILM COATED ORAL at 06:12

## 2020-02-01 RX ADMIN — CLOTRIMAZOLE AND BETAMETHASONE DIPROPIONATE SCH: 10; .5 CREAM TOPICAL at 09:27

## 2020-02-01 RX ADMIN — DILTIAZEM HYDROCHLORIDE SCH MG: 30 TABLET, FILM COATED ORAL at 12:58

## 2020-02-01 RX ADMIN — GUAIFENESIN AND DEXTROMETHORPHAN HYDROBROMIDE SCH: 100; 10 SOLUTION ORAL at 19:22

## 2020-02-01 RX ADMIN — AZITHROMYCIN DIHYDRATE SCH MLS/HR: 500 INJECTION, POWDER, LYOPHILIZED, FOR SOLUTION INTRAVENOUS at 10:01

## 2020-02-01 RX ADMIN — GUAIFENESIN AND DEXTROMETHORPHAN HYDROBROMIDE SCH ML: 100; 10 SOLUTION ORAL at 01:19

## 2020-02-01 RX ADMIN — ANASTROZOLE SCH MG: 1 TABLET, COATED ORAL at 09:26

## 2020-02-01 RX ADMIN — METHYLPREDNISOLONE SODIUM SUCCINATE SCH MG: 40 INJECTION, POWDER, FOR SOLUTION INTRAMUSCULAR; INTRAVENOUS at 01:19

## 2020-02-01 RX ADMIN — GABAPENTIN SCH MG: 300 CAPSULE ORAL at 06:12

## 2020-02-01 RX ADMIN — ASPIRIN 81 MG SCH MG: 81 TABLET ORAL at 09:26

## 2020-02-01 RX ADMIN — GUAIFENESIN AND DEXTROMETHORPHAN HYDROBROMIDE SCH ML: 100; 10 SOLUTION ORAL at 06:13

## 2020-02-01 RX ADMIN — DILTIAZEM HYDROCHLORIDE SCH MG: 30 TABLET, FILM COATED ORAL at 18:14

## 2020-02-01 RX ADMIN — IPRATROPIUM BROMIDE AND ALBUTEROL SULFATE SCH AMP: .5; 3 SOLUTION RESPIRATORY (INHALATION) at 15:45

## 2020-02-01 RX ADMIN — LOSARTAN POTASSIUM SCH MG: 50 TABLET, FILM COATED ORAL at 09:26

## 2020-02-01 RX ADMIN — LEVOTHYROXINE SODIUM SCH MCG: 150 TABLET ORAL at 06:12

## 2020-02-01 RX ADMIN — IPRATROPIUM BROMIDE AND ALBUTEROL SULFATE SCH AMP: .5; 3 SOLUTION RESPIRATORY (INHALATION) at 08:45

## 2020-02-01 RX ADMIN — ZINC OXIDE SCH: 200 OINTMENT TOPICAL at 21:13

## 2020-02-01 RX ADMIN — GUAIFENESIN AND DEXTROMETHORPHAN HYDROBROMIDE SCH ML: 100; 10 SOLUTION ORAL at 12:58

## 2020-02-01 RX ADMIN — ATORVASTATIN CALCIUM SCH MG: 10 TABLET, FILM COATED ORAL at 21:11

## 2020-02-01 RX ADMIN — CLOTRIMAZOLE AND BETAMETHASONE DIPROPIONATE SCH: 10; .5 CREAM TOPICAL at 21:12

## 2020-02-01 RX ADMIN — INSULIN ASPART SCH UNITS: 100 INJECTION, SOLUTION INTRAVENOUS; SUBCUTANEOUS at 21:12

## 2020-02-01 RX ADMIN — GABAPENTIN SCH MG: 300 CAPSULE ORAL at 14:07

## 2020-02-01 RX ADMIN — METHYLPREDNISOLONE SODIUM SUCCINATE SCH MG: 40 INJECTION, POWDER, FOR SOLUTION INTRAMUSCULAR; INTRAVENOUS at 09:26

## 2020-02-01 RX ADMIN — GABAPENTIN SCH MG: 300 CAPSULE ORAL at 21:11

## 2020-02-01 RX ADMIN — HYDROCHLOROTHIAZIDE SCH MG: 12.5 CAPSULE ORAL at 09:26

## 2020-02-01 RX ADMIN — HEPARIN SODIUM SCH UNIT: 5000 INJECTION, SOLUTION INTRAVENOUS; SUBCUTANEOUS at 09:26

## 2020-02-01 RX ADMIN — ZINC OXIDE SCH: 200 OINTMENT TOPICAL at 09:27

## 2020-02-01 RX ADMIN — GUAIFENESIN AND DEXTROMETHORPHAN HYDROBROMIDE SCH ML: 100; 10 SOLUTION ORAL at 21:13

## 2020-02-01 RX ADMIN — DILTIAZEM HYDROCHLORIDE SCH MG: 30 TABLET, FILM COATED ORAL at 01:19

## 2020-02-01 RX ADMIN — CEFTRIAXONE SCH MLS/HR: 1 INJECTION, POWDER, FOR SOLUTION INTRAMUSCULAR; INTRAVENOUS at 11:30

## 2020-02-01 NOTE — PN
Progress Note (short form)





- Note


Progress Note: 





PULMONARY





Less short of breath today. Less cough and wheezing.





 Vital Signs











 Period  Temp  Pulse  Resp  BP Sys/Spence  Pulse Ox


 


 Last 24 Hr  97.5 F-98.6 F  80-95  18-20  136-151/57-85  95-95








 Intake & Output











 01/29/20 01/30/20 01/31/20 02/01/20





 23:59 23:59 23:59 23:59


 


Intake Total 660 490 350 200


 


Output Total 1400 1700  400


 


Balance -740 -1210 350 -200


 


Weight 109.769 kg 109.86 kg  











Gen:  less tachypneic at rest


Heart: RRR


Lung: scattered rhonchi, wheezes


Abd: soft, nontender


Ext: + edema





 CBC, BMP





 01/29/20 05:35 





 02/01/20 07:10 





Active Medications





Acetaminophen (Tylenol -)  650 mg PO Q6H PRN


   PRN Reason: MODERATE PAIN (4-7)


Albuterol Sulfate (Ventolin 0.083% Nebulizer Soln -)  1 amp NEB Q4H PRN


   PRN Reason: SHORT OF BREATH/WHEEZING


Albuterol/Ipratropium (Duoneb -)  1 amp NEB RQID [facility]


   Last Admin: 02/01/20 11:50 Dose:  1 amp


Anastrozole (Arimidex -)  1 mg PO DAILY [facility]


   Last Admin: 02/01/20 09:26 Dose:  1 mg


Aspirin (Asa -)  81 mg PO DAILY [facility]


   Last Admin: 02/01/20 09:26 Dose:  81 mg


Atorvastatin Calcium (Lipitor -)  10 mg PO HS [facility]


   Last Admin: 01/31/20 21:52 Dose:  10 mg


Clotrimazole (Lotrisone Cream (Small Tube))  1 applic TP BID [facility]


   Last Admin: 02/01/20 09:27 Dose:  Not Given


Diltiazem HCl (Cardizem -)  30 mg PO Q6HPO [facility]


   Last Admin: 02/01/20 12:58 Dose:  30 mg


Furosemide (Lasix Injection -)  40 mg IVPUSH DAILY [facility]


   Last Admin: 02/01/20 09:26 Dose:  40 mg


Gabapentin (Neurontin -)  300 mg PO TID [facility]


   Last Admin: 02/01/20 14:07 Dose:  300 mg


Guaifenesin (Diabetic Tussin Dm -)  10 ml PO Q4H [facility]


   Last Admin: 02/01/20 12:58 Dose:  10 ml


Heparin Sodium (Porcine) (Heparin -)  5,000 unit SQ BID [facility]


   Last Admin: 02/01/20 09:26 Dose:  5,000 unit


Hydrochlorothiazide (Hctz -)  12.5 mg PO DAILY [facility]


   Last Admin: 02/01/20 09:26 Dose:  12.5 mg


Ceftriaxone Sodium 1 gm/ (Dextrose)  50 mls @ 100 mls/hr IVPB DAILY [facility]


   Last Admin: 02/01/20 11:30 Dose:  100 mls/hr


Azithromycin (Zithromax 500mg Ivpb (Pre-Docked))  500 mg in 250 mls @ 250 mls/

hr IVPB DAILY [facility]


   Last Admin: 02/01/20 10:01 Dose:  250 mls/hr


Levothyroxine Sodium (Synthroid -)  150 mcg PO AM [facility]


   Last Admin: 02/01/20 06:12 Dose:  150 mcg


Losartan Potassium (Cozaar -)  50 mg PO DAILY [facility]


   Last Admin: 02/01/20 09:26 Dose:  50 mg


Methylprednisolone Sodium Succinate (Solu-Medrol -)  40 mg IVPUSH Q8H-IV [facility]


   Last Admin: 02/01/20 09:26 Dose:  40 mg


Multi-Ingredient Ointment (Zinc Oxide)  1 applic TP BID [facility]


   Last Admin: 02/01/20 09:27 Dose:  Not Given











A/P


URI


Acute COPD Exacerbation


Sepsis


Lactic Acidosis


Acute on Chronic Renal Failure


HTN


Hyperlipidemia


Anemia





-  continue lasix


-  monitor urine output, creatinine


-  on empiric antibiotics


-  will decrease medrol to daily


-  inhaled bronchodilators


-  O2 to keep Spo2 >90%


-  DVT prophylaxis





Problem List





- Problems


(1) COPD with acute exacerbation


Code(s): J44.1 - CHRONIC OBSTRUCTIVE PULMONARY DISEASE W (ACUTE) EXACERBATION

## 2020-02-01 NOTE — PN
Progress Note, Physician


Chief Complaint: 


patient found eating, comfortable, coughs sporadically with productive cough


History of Present Illness: 





89 admitted for high fever, shortness of breath and cough . She is receiving iv 

antibiotics ( Ceftriaxone and Zithromax), and SoluCortef . Metoprolol was 

started yesterday for HR control. She had an uneventful night but early this am 

developed tachycardia at 130 b/min, with an increase of the HR  to 160 b/min 

after administration of the nebulizer treatment.Currently she shas been 

switched to Cardizem. for rate control.








- Current Medication List


Current Medications: 


Active Medications





Acetaminophen (Tylenol -)  650 mg PO Q6H PRN


   PRN Reason: MODERATE PAIN (4-7)


Albuterol Sulfate (Ventolin 0.083% Nebulizer Soln -)  1 amp NEB Q4H PRN


   PRN Reason: SHORT OF BREATH/WHEEZING


Albuterol/Ipratropium (Duoneb -)  1 amp NEB RQID [facility]


   Last Admin: 02/01/20 15:45 Dose:  1 amp


Anastrozole (Arimidex -)  1 mg PO DAILY [facility]


   Last Admin: 02/01/20 09:26 Dose:  1 mg


Aspirin (Asa -)  81 mg PO DAILY [facility]


   Last Admin: 02/01/20 09:26 Dose:  81 mg


Atorvastatin Calcium (Lipitor -)  10 mg PO HS [facility]


   Last Admin: 01/31/20 21:52 Dose:  10 mg


Clotrimazole (Lotrisone Cream (Small Tube))  1 applic TP BID [facility]


   Last Admin: 02/01/20 09:27 Dose:  Not Given


Diltiazem HCl (Cardizem -)  30 mg PO Q6HPO [facility]


   Last Admin: 02/01/20 12:58 Dose:  30 mg


Furosemide (Lasix Injection -)  40 mg IVPUSH DAILY [facility]


   Last Admin: 02/01/20 09:26 Dose:  40 mg


Gabapentin (Neurontin -)  300 mg PO TID [facility]


   Last Admin: 02/01/20 14:07 Dose:  300 mg


Guaifenesin (Diabetic Tussin Dm -)  10 ml PO Q4H [facility]


   Last Admin: 02/01/20 12:58 Dose:  10 ml


Heparin Sodium (Porcine) (Heparin -)  5,000 unit SQ BID [facility]


   Last Admin: 02/01/20 09:26 Dose:  5,000 unit


Hydrochlorothiazide (Hctz -)  12.5 mg PO DAILY [facility]


   Last Admin: 02/01/20 09:26 Dose:  12.5 mg


Ceftriaxone Sodium 1 gm/ (Dextrose)  50 mls @ 100 mls/hr IVPB DAILY [facility]


   Last Admin: 02/01/20 11:30 Dose:  100 mls/hr


Azithromycin (Zithromax 500mg Ivpb (Pre-Docked))  500 mg in 250 mls @ 250 mls/

hr IVPB DAILY [facility]


   Last Admin: 02/01/20 10:01 Dose:  250 mls/hr


Insulin Aspart (Novolog Vial Sliding Scale -)  1 vial SQ ACHS Replaced by [facility]; Protocol


Levothyroxine Sodium (Synthroid -)  150 mcg PO AM [facility]


   Last Admin: 02/01/20 06:12 Dose:  150 mcg


Losartan Potassium (Cozaar -)  50 mg PO DAILY [facility]


   Last Admin: 02/01/20 09:26 Dose:  50 mg


Methylprednisolone Sodium Succinate (Solu-Medrol -)  40 mg IVPUSH DAILY [facility]


Multi-Ingredient Ointment (Zinc Oxide)  1 applic TP BID [facility]


   Last Admin: 02/01/20 09:27 Dose:  Not Given











- Objective


Vital Signs: 


 Vital Signs











Temperature  98 F   02/01/20 14:35


 


Pulse Rate  89   02/01/20 14:35


 


Respiratory Rate  22 H  02/01/20 14:35


 


Blood Pressure  144/77   02/01/20 14:35


 


O2 Sat by Pulse Oximetry (%)  95   02/01/20 09:00











Constitutional: Yes: No Distress, Calm


Eyes: Yes: Conjunctiva Clear, EOM Intact


HENT: Yes: Atraumatic, Normocephalic


Neck: Yes: Supple, Trachea Midline


Cardiovascular: Yes: Pulse Irregular


Respiratory: Yes: Regular, CTA Bilaterally, Cough, On Nasal O2


Gastrointestinal: Yes: Normal Bowel Sounds, Soft, Abdomen, Obese.  No: 

Hepatomegaly, Splenomegaly


Musculoskeletal: Yes: Muscle Weakness


Extremities: No: Calf Tenderness


Edema: Yes


Edema: LLE: 1+, RLE: 1+


Neurological: Yes: Alert, Oriented


Psychiatric: Yes: Alert, Oriented


Labs: 


 CBC, BMP





 01/29/20 05:35 





 02/01/20 07:10 











Problem List





- Problems


(1) Arrhythmia


Assessment/Plan: 


sinus tachycardia


started CArdizem








Code(s): I49.9 - CARDIAC ARRHYTHMIA, UNSPECIFIED   





(2) COPD with acute exacerbation


Assessment/Plan: 


continue SoluCortef and Ceftriaxone


Code(s): J44.1 - CHRONIC OBSTRUCTIVE PULMONARY DISEASE W (ACUTE) EXACERBATION   





(3) Chronic kidney failure


Assessment/Plan: 


Lasix 40 mg iv administered


will continue monitoring renal function


Code(s): N18.9 - CHRONIC KIDNEY DISEASE, UNSPECIFIED   





(4) Diabetes mellitus type 2 in obese


Assessment/Plan: 


accuchecks  AC and HS with regular coverage


Code(s): E11.69 - TYPE 2 DIABETES MELLITUS WITH OTHER SPECIFIED COMPLICATION; 

E66.9 - OBESITY, UNSPECIFIED   





(5) Pulmonary edema


Assessment/Plan: 


CXR stat





EKG


Code(s): J81.1 - CHRONIC PULMONARY EDEMA

## 2020-02-01 NOTE — PN
Progress Note, Physician


History of Present Illness: 





AWAKE IN BED


BRAETHING IMPROVED  TODAY


NO C/O LEG PAIN


AFEBRILE


TOLERATING  CEPHALOSPORIN





- Current Medication List


Current Medications: 


Active Medications





Acetaminophen (Tylenol -)  650 mg PO Q6H PRN


   PRN Reason: MODERATE PAIN (4-7)


Albuterol Sulfate (Ventolin 0.083% Nebulizer Soln -)  1 amp NEB Q4H PRN


   PRN Reason: SHORT OF BREATH/WHEEZING


Albuterol/Ipratropium (Duoneb -)  1 amp NEB RQID [facility]


   Last Admin: 02/01/20 08:45 Dose:  1 amp


Anastrozole (Arimidex -)  1 mg PO DAILY [facility]


   Last Admin: 02/01/20 09:26 Dose:  1 mg


Aspirin (Asa -)  81 mg PO DAILY [facility]


   Last Admin: 02/01/20 09:26 Dose:  81 mg


Atorvastatin Calcium (Lipitor -)  10 mg PO HS [facility]


   Last Admin: 01/31/20 21:52 Dose:  10 mg


Clotrimazole (Lotrisone Cream (Small Tube))  1 applic TP BID [facility]


   Last Admin: 02/01/20 09:27 Dose:  Not Given


Diltiazem HCl (Cardizem -)  30 mg PO Q6HPO [facility]


   Last Admin: 02/01/20 06:12 Dose:  30 mg


Furosemide (Lasix Injection -)  40 mg IVPUSH DAILY [facility]


   Last Admin: 02/01/20 09:26 Dose:  40 mg


Gabapentin (Neurontin -)  300 mg PO TID [facility]


   Last Admin: 02/01/20 06:12 Dose:  300 mg


Guaifenesin (Diabetic Tussin Dm -)  10 ml PO Q4H [facility]


   Last Admin: 02/01/20 09:27 Dose:  10 ml


Heparin Sodium (Porcine) (Heparin -)  5,000 unit SQ BID [facility]


   Last Admin: 02/01/20 09:26 Dose:  5,000 unit


Hydrochlorothiazide (Hctz -)  12.5 mg PO DAILY [facility]


   Last Admin: 02/01/20 09:26 Dose:  12.5 mg


Ceftriaxone Sodium 1 gm/ (Dextrose)  50 mls @ 100 mls/hr IVPB DAILY [facility]


   Last Admin: 01/31/20 11:03 Dose:  100 mls/hr


Azithromycin (Zithromax 500mg Ivpb (Pre-Docked))  500 mg in 250 mls @ 250 mls/

hr IVPB DAILY [facility]


   Last Admin: 02/01/20 10:01 Dose:  250 mls/hr


Levothyroxine Sodium (Synthroid -)  150 mcg PO AM [facility]


   Last Admin: 02/01/20 06:12 Dose:  150 mcg


Losartan Potassium (Cozaar -)  50 mg PO DAILY [facility]


   Last Admin: 02/01/20 09:26 Dose:  50 mg


Methylprednisolone Sodium Succinate (Solu-Medrol -)  40 mg IVPUSH Q8H-IV [facility]


   Last Admin: 02/01/20 09:26 Dose:  40 mg


Multi-Ingredient Ointment (Zinc Oxide)  1 applic TP BID [facility]


   Last Admin: 02/01/20 09:27 Dose:  Not Given











- Objective


Vital Signs: 


 Vital Signs











Temperature  97.6 F   02/01/20 10:00


 


Pulse Rate  95 H  02/01/20 10:00


 


Respiratory Rate  18   02/01/20 10:00


 


Blood Pressure  151/85   02/01/20 10:00


 


O2 Sat by Pulse Oximetry (%)  95   02/01/20 09:00











Constitutional: Yes: No Distress, Obese


Eyes: Yes: Conjunctiva Clear


Cardiovascular: Yes: Regular Rate and Rhythm, S1, S2


Respiratory: Yes: Rhonchi


Gastrointestinal: Yes: Normal Bowel Sounds, Soft, Abdomen, Obese.  No: 

Tenderness


Edema: Yes


Labs: 


 CBC, BMP





 01/29/20 05:35 





 02/01/20 07:10 











Assessment/Plan





URI


EXACERBATION COPD


? CELLULITIS LE   IMPROVED 


+ CHRONIC VENOUS STASIS


PCN ALLERGY


CONTINUE EMPIRIC CEFTRIAXONE/ ZITHROMAX

## 2020-02-02 LAB
ANION GAP SERPL CALC-SCNC: 8 MMOL/L (ref 8–16)
BUN SERPL-MCNC: 55 MG/DL (ref 7–18)
CALCIUM SERPL-MCNC: 9.4 MG/DL (ref 8.5–10.1)
CHLORIDE SERPL-SCNC: 98 MMOL/L (ref 98–107)
CO2 SERPL-SCNC: 35 MMOL/L (ref 21–32)
CREAT SERPL-MCNC: 1.5 MG/DL (ref 0.55–1.3)
GLUCOSE SERPL-MCNC: 179 MG/DL (ref 74–106)
POTASSIUM SERPLBLD-SCNC: 3.6 MMOL/L (ref 3.5–5.1)
SODIUM SERPL-SCNC: 141 MMOL/L (ref 136–145)

## 2020-02-02 RX ADMIN — GABAPENTIN SCH MG: 300 CAPSULE ORAL at 05:32

## 2020-02-02 RX ADMIN — FUROSEMIDE SCH MG: 10 INJECTION, SOLUTION INTRAVENOUS at 10:45

## 2020-02-02 RX ADMIN — INSULIN ASPART SCH UNITS: 100 INJECTION, SOLUTION INTRAVENOUS; SUBCUTANEOUS at 12:51

## 2020-02-02 RX ADMIN — METHYLPREDNISOLONE SODIUM SUCCINATE SCH MG: 40 INJECTION, POWDER, FOR SOLUTION INTRAMUSCULAR; INTRAVENOUS at 21:35

## 2020-02-02 RX ADMIN — HEPARIN SODIUM SCH UNIT: 5000 INJECTION, SOLUTION INTRAVENOUS; SUBCUTANEOUS at 10:45

## 2020-02-02 RX ADMIN — FUROSEMIDE SCH: 10 INJECTION, SOLUTION INTRAVENOUS at 11:03

## 2020-02-02 RX ADMIN — POTASSIUM CHLORIDE SCH MEQ: 20 SOLUTION ORAL at 10:45

## 2020-02-02 RX ADMIN — DILTIAZEM HYDROCHLORIDE SCH MG: 30 TABLET, FILM COATED ORAL at 18:02

## 2020-02-02 RX ADMIN — GUAIFENESIN AND DEXTROMETHORPHAN HYDROBROMIDE SCH ML: 100; 10 SOLUTION ORAL at 10:44

## 2020-02-02 RX ADMIN — ATORVASTATIN CALCIUM SCH MG: 10 TABLET, FILM COATED ORAL at 21:36

## 2020-02-02 RX ADMIN — ZINC OXIDE SCH APPLIC: 200 OINTMENT TOPICAL at 21:43

## 2020-02-02 RX ADMIN — ANASTROZOLE SCH MG: 1 TABLET, COATED ORAL at 10:44

## 2020-02-02 RX ADMIN — IPRATROPIUM BROMIDE AND ALBUTEROL SULFATE SCH AMP: .5; 3 SOLUTION RESPIRATORY (INHALATION) at 15:56

## 2020-02-02 RX ADMIN — ZINC OXIDE SCH: 200 OINTMENT TOPICAL at 10:45

## 2020-02-02 RX ADMIN — AZITHROMYCIN DIHYDRATE SCH MLS/HR: 500 INJECTION, POWDER, LYOPHILIZED, FOR SOLUTION INTRAVENOUS at 10:45

## 2020-02-02 RX ADMIN — DILTIAZEM HYDROCHLORIDE SCH MG: 30 TABLET, FILM COATED ORAL at 05:32

## 2020-02-02 RX ADMIN — GUAIFENESIN AND DEXTROMETHORPHAN HYDROBROMIDE SCH ML: 100; 10 SOLUTION ORAL at 05:32

## 2020-02-02 RX ADMIN — GUAIFENESIN AND DEXTROMETHORPHAN HYDROBROMIDE SCH ML: 100; 10 SOLUTION ORAL at 21:41

## 2020-02-02 RX ADMIN — GABAPENTIN SCH MG: 300 CAPSULE ORAL at 21:36

## 2020-02-02 RX ADMIN — INSULIN ASPART SCH UNITS: 100 INJECTION, SOLUTION INTRAVENOUS; SUBCUTANEOUS at 06:31

## 2020-02-02 RX ADMIN — INSULIN ASPART SCH UNITS: 100 INJECTION, SOLUTION INTRAVENOUS; SUBCUTANEOUS at 21:36

## 2020-02-02 RX ADMIN — GUAIFENESIN AND DEXTROMETHORPHAN HYDROBROMIDE SCH ML: 100; 10 SOLUTION ORAL at 00:16

## 2020-02-02 RX ADMIN — FUROSEMIDE SCH MG: 10 INJECTION, SOLUTION INTRAVENOUS at 14:38

## 2020-02-02 RX ADMIN — DILTIAZEM HYDROCHLORIDE SCH MG: 30 TABLET, FILM COATED ORAL at 14:38

## 2020-02-02 RX ADMIN — ALBUTEROL SULFATE PRN AMP: 2.5 SOLUTION RESPIRATORY (INHALATION) at 03:40

## 2020-02-02 RX ADMIN — DILTIAZEM HYDROCHLORIDE SCH MG: 30 TABLET, FILM COATED ORAL at 23:59

## 2020-02-02 RX ADMIN — GABAPENTIN SCH MG: 300 CAPSULE ORAL at 14:38

## 2020-02-02 RX ADMIN — GUAIFENESIN AND DEXTROMETHORPHAN HYDROBROMIDE SCH: 100; 10 SOLUTION ORAL at 14:38

## 2020-02-02 RX ADMIN — INSULIN ASPART SCH UNITS: 100 INJECTION, SOLUTION INTRAVENOUS; SUBCUTANEOUS at 18:02

## 2020-02-02 RX ADMIN — HYDROCHLOROTHIAZIDE SCH MG: 12.5 CAPSULE ORAL at 10:44

## 2020-02-02 RX ADMIN — CLOTRIMAZOLE AND BETAMETHASONE DIPROPIONATE SCH APPLIC: 10; .5 CREAM TOPICAL at 21:43

## 2020-02-02 RX ADMIN — LEVOTHYROXINE SODIUM SCH MCG: 150 TABLET ORAL at 06:32

## 2020-02-02 RX ADMIN — IPRATROPIUM BROMIDE AND ALBUTEROL SULFATE SCH AMP: .5; 3 SOLUTION RESPIRATORY (INHALATION) at 07:40

## 2020-02-02 RX ADMIN — CEFTRIAXONE SCH MLS/HR: 1 INJECTION, POWDER, FOR SOLUTION INTRAMUSCULAR; INTRAVENOUS at 12:51

## 2020-02-02 RX ADMIN — ASPIRIN 81 MG SCH MG: 81 TABLET ORAL at 10:44

## 2020-02-02 RX ADMIN — IPRATROPIUM BROMIDE AND ALBUTEROL SULFATE SCH AMP: .5; 3 SOLUTION RESPIRATORY (INHALATION) at 20:28

## 2020-02-02 RX ADMIN — HEPARIN SODIUM SCH UNIT: 5000 INJECTION, SOLUTION INTRAVENOUS; SUBCUTANEOUS at 21:35

## 2020-02-02 RX ADMIN — GUAIFENESIN AND DEXTROMETHORPHAN HYDROBROMIDE SCH ML: 100; 10 SOLUTION ORAL at 18:02

## 2020-02-02 RX ADMIN — CLOTRIMAZOLE AND BETAMETHASONE DIPROPIONATE SCH: 10; .5 CREAM TOPICAL at 10:45

## 2020-02-02 RX ADMIN — DILTIAZEM HYDROCHLORIDE SCH MG: 30 TABLET, FILM COATED ORAL at 00:16

## 2020-02-02 RX ADMIN — IPRATROPIUM BROMIDE AND ALBUTEROL SULFATE SCH AMP: .5; 3 SOLUTION RESPIRATORY (INHALATION) at 11:25

## 2020-02-02 RX ADMIN — LOSARTAN POTASSIUM SCH MG: 50 TABLET, FILM COATED ORAL at 10:44

## 2020-02-02 NOTE — PN
Progress Note (short form)





- Note


Progress Note: 





PULMONARY





Less short of breath today. Still some cough and wheezing.





 Vital Signs











 Period  Temp  Pulse  Resp  BP Sys/Spence  Pulse Ox


 


 Last 24 Hr  97.2 F-98.0 F  85-93  20-22  130-144/70-80  95











Gen:  less tachypneic at rest


Heart: RRR


Lung: scattered rhonchi, wheezes


Abd: soft, nontender


Ext: + edema





 CBC, BMP





 01/29/20 05:35 





 02/02/20 05:45 





Active Medications





Acetaminophen (Tylenol -)  650 mg PO Q6H PRN


   PRN Reason: MODERATE PAIN (4-7)


Albuterol Sulfate (Ventolin 0.083% Nebulizer Soln -)  1 amp NEB Q4H PRN


   PRN Reason: SHORT OF BREATH/WHEEZING


   Last Admin: 02/02/20 03:40 Dose:  1 amp


Albuterol/Ipratropium (Duoneb -)  1 amp NEB RQID [facility]


   Last Admin: 02/02/20 11:25 Dose:  1 amp


Anastrozole (Arimidex -)  1 mg PO DAILY [facility]


   Last Admin: 02/02/20 10:44 Dose:  1 mg


Aspirin (Asa -)  81 mg PO DAILY [facility]


   Last Admin: 02/02/20 10:44 Dose:  81 mg


Atorvastatin Calcium (Lipitor -)  10 mg PO HS [facility]


   Last Admin: 02/01/20 21:11 Dose:  10 mg


Clotrimazole (Lotrisone Cream (Small Tube))  1 applic TP BID [facility]


   Last Admin: 02/02/20 10:45 Dose:  Not Given


Diltiazem HCl (Cardizem -)  30 mg PO Q6HPO [facility]


   Last Admin: 02/02/20 05:32 Dose:  30 mg


Furosemide (Lasix Injection -)  40 mg IVPUSH BIDLASIX [facility]


   Last Admin: 02/02/20 10:45 Dose:  40 mg


Gabapentin (Neurontin -)  300 mg PO TID [facility]


   Last Admin: 02/02/20 05:32 Dose:  300 mg


Guaifenesin (Diabetic Tussin Dm -)  10 ml PO Q4H [facility]


   Last Admin: 02/02/20 10:44 Dose:  10 ml


Heparin Sodium (Porcine) (Heparin -)  5,000 unit SQ BID [facility]


   Last Admin: 02/02/20 10:45 Dose:  5,000 unit


Ceftriaxone Sodium 1 gm/ (Dextrose)  50 mls @ 100 mls/hr IVPB DAILY [facility]


   Last Admin: 02/02/20 12:51 Dose:  100 mls/hr


Azithromycin (Zithromax 500mg Ivpb (Pre-Docked))  500 mg in 250 mls @ 250 mls/

hr IVPB DAILY [facility]


   Last Admin: 02/02/20 10:45 Dose:  250 mls/hr


Insulin Aspart (Novolog Vial Sliding Scale -)  1 vial SQ ACHS [facility]; Protocol


   Last Admin: 02/02/20 12:51 Dose:  6 units


Levothyroxine Sodium (Synthroid -)  150 mcg PO AM [facility]


   Last Admin: 02/02/20 06:32 Dose:  150 mcg


Losartan Potassium (Cozaar -)  50 mg PO DAILY [facility]


   Last Admin: 02/02/20 10:44 Dose:  50 mg


Methylprednisolone Sodium Succinate (Solu-Medrol -)  40 mg IVPUSH Q12H [facility]


Multi-Ingredient Ointment (Zinc Oxide)  1 applic TP BID [facility]


   Last Admin: 02/02/20 10:45 Dose:  Not Given


Potassium Chloride (Potassium Chloride Oral Liquid)  40 meq PO DAILY [facility]


   Last Admin: 02/02/20 10:45 Dose:  40 meq











A/P


URI


Acute COPD Exacerbation


Sepsis


Lactic Acidosis


Volume Overload


Acute on Chronic Renal Failure


HTN


Hyperlipidemia


Anemia





-  continue lasix


-  monitor urine output, creatinine


-  on empiric antibiotics


-  continue medrol daily


-  inhaled bronchodilators


-  O2 to keep Spo2 >90%


-  DVT prophylaxis





Problem List





- Problems


(1) COPD with acute exacerbation


Code(s): J44.1 - CHRONIC OBSTRUCTIVE PULMONARY DISEASE W (ACUTE) EXACERBATION

## 2020-02-02 NOTE — PN
Progress Note, Physician


Chief Complaint: 


patient examined, cough is persisting and patient is dyspneic





History of Present Illness: 





89 admitted for Sepsis with high fever, shortness of breath and cough . iv 

antibiotics were started, associated with  SoluCortef .


 Wheezing is persisting. 





- Current Medication List


Current Medications: 


Active Medications





Acetaminophen (Tylenol -)  650 mg PO Q6H PRN


   PRN Reason: MODERATE PAIN (4-7)


Albuterol Sulfate (Ventolin 0.083% Nebulizer Soln -)  1 amp NEB Q4H PRN


   PRN Reason: SHORT OF BREATH/WHEEZING


   Last Admin: 02/02/20 03:40 Dose:  1 amp


Albuterol/Ipratropium (Duoneb -)  1 amp NEB RQID [facility]


   Last Admin: 02/02/20 11:25 Dose:  1 amp


Anastrozole (Arimidex -)  1 mg PO DAILY [facility]


   Last Admin: 02/02/20 10:44 Dose:  1 mg


Aspirin (Asa -)  81 mg PO DAILY [facility]


   Last Admin: 02/02/20 10:44 Dose:  81 mg


Atorvastatin Calcium (Lipitor -)  10 mg PO HS [facility]


   Last Admin: 02/01/20 21:11 Dose:  10 mg


Clotrimazole (Lotrisone Cream (Small Tube))  1 applic TP BID [facility]


   Last Admin: 02/02/20 10:45 Dose:  Not Given


Diltiazem HCl (Cardizem -)  30 mg PO Q6HPO [facility]


   Last Admin: 02/02/20 05:32 Dose:  30 mg


Furosemide (Lasix Injection -)  40 mg IVPUSH BIDLASIX [facility]


   Last Admin: 02/02/20 10:45 Dose:  40 mg


Gabapentin (Neurontin -)  300 mg PO TID [facility]


   Last Admin: 02/02/20 05:32 Dose:  300 mg


Guaifenesin (Diabetic Tussin Dm -)  10 ml PO Q4H [facility]


   Last Admin: 02/02/20 10:44 Dose:  10 ml


Heparin Sodium (Porcine) (Heparin -)  5,000 unit SQ BID [facility]


   Last Admin: 02/02/20 10:45 Dose:  5,000 unit


Ceftriaxone Sodium 1 gm/ (Dextrose)  50 mls @ 100 mls/hr IVPB DAILY [facility]


   Last Admin: 02/02/20 12:51 Dose:  100 mls/hr


Azithromycin (Zithromax 500mg Ivpb (Pre-Docked))  500 mg in 250 mls @ 250 mls/

hr IVPB DAILY [facility]


   Last Admin: 02/02/20 10:45 Dose:  250 mls/hr


Insulin Aspart (Novolog Vial Sliding Scale -)  1 vial SQ ACHS [facility]; Protocol


   Last Admin: 02/02/20 12:51 Dose:  6 units


Levothyroxine Sodium (Synthroid -)  150 mcg PO AM [facility]


   Last Admin: 02/02/20 06:32 Dose:  150 mcg


Losartan Potassium (Cozaar -)  50 mg PO DAILY [facility]


   Last Admin: 02/02/20 10:44 Dose:  50 mg


Methylprednisolone Sodium Succinate (Solu-Medrol -)  40 mg IVPUSH Q12H [facility]


Multi-Ingredient Ointment (Zinc Oxide)  1 applic TP BID [facility]


   Last Admin: 02/02/20 10:45 Dose:  Not Given


Potassium Chloride (Potassium Chloride Oral Liquid)  40 meq PO DAILY [facility]


   Last Admin: 02/02/20 10:45 Dose:  40 meq











- Objective


Vital Signs: 


 Vital Signs











Temperature  98 F   02/02/20 06:48


 


Pulse Rate  89   02/02/20 06:48


 


Respiratory Rate  20   02/01/20 19:50


 


Blood Pressure  132/75   02/02/20 06:48


 


O2 Sat by Pulse Oximetry (%)  95   02/01/20 19:49











Constitutional: Yes: Moderate Distress


Eyes: Yes: Conjunctiva Clear, EOM Intact


HENT: Yes: Atraumatic, Normocephalic


Cardiovascular: Yes: Regular Rate and Rhythm


Respiratory: Yes: Regular, Cough, On Nasal O2, SOB


Gastrointestinal: Yes: Normal Bowel Sounds, Soft, Abdomen, Obese, Hepatomegaly, 

Splenomegaly


Extremities: No: Calf Tenderness


Edema: No


Peripheral Pulses WNL: Yes


Neurological: Yes: Alert, Oriented


Psychiatric: Yes: Alert, Oriented


Labs: 


 CBC, BMP





 01/29/20 05:35 





 02/02/20 05:45 











Problem List





- Problems


(1) Arrhythmia


Assessment/Plan: 


sinus tachycardia


MEtoprolol for HR control








Code(s): I49.9 - CARDIAC ARRHYTHMIA, UNSPECIFIED   





(2) COPD with acute exacerbation


Assessment/Plan: 


continue SoluCortef, Zithromax and Ceftriaxone


Code(s): J44.1 - CHRONIC OBSTRUCTIVE PULMONARY DISEASE W (ACUTE) EXACERBATION   





(3) Chronic kidney failure


Assessment/Plan: 


better, restarted the HCTZ


Code(s): N18.9 - CHRONIC KIDNEY DISEASE, UNSPECIFIED   





(4) Diabetes mellitus type 2 in obese


Assessment/Plan: 


accuchecks  AC and HS with regular coverage


Code(s): E11.69 - TYPE 2 DIABETES MELLITUS WITH OTHER SPECIFIED COMPLICATION; 

E66.9 - OBESITY, UNSPECIFIED   





(5) Pulmonary edema


Assessment/Plan: 


CXR stat





EKG


Code(s): J81.1 - CHRONIC PULMONARY EDEMA   





(6) Sepsis


Assessment/Plan: 


Sepsis present upon admission


fever responded to antibiotics 





Code(s): A41.9 - SEPSIS, UNSPECIFIED ORGANISM

## 2020-02-02 NOTE — PN
Progress Note, Physician


Chief Complaint: 





chf, sob


History of Present Illness: 





sob felt much better yest during day.


today she is a bit more breathless.


hasn't urinated a lot yet today (has not yet received lasix dose)





no cp.


on palp,


no swelling





- Current Medication List


Current Medications: 


Active Medications





Acetaminophen (Tylenol -)  650 mg PO Q6H PRN


   PRN Reason: MODERATE PAIN (4-7)


Albuterol Sulfate (Ventolin 0.083% Nebulizer Soln -)  1 amp NEB Q4H PRN


   PRN Reason: SHORT OF BREATH/WHEEZING


   Last Admin: 02/02/20 03:40 Dose:  1 amp


Albuterol/Ipratropium (Duoneb -)  1 amp NEB RQID [facility]


   Last Admin: 02/01/20 20:30 Dose:  1 amp


Anastrozole (Arimidex -)  1 mg PO DAILY [facility]


   Last Admin: 02/01/20 09:26 Dose:  1 mg


Aspirin (Asa -)  81 mg PO DAILY [facility]


   Last Admin: 02/01/20 09:26 Dose:  81 mg


Atorvastatin Calcium (Lipitor -)  10 mg PO HS [facility]


   Last Admin: 02/01/20 21:11 Dose:  10 mg


Clotrimazole (Lotrisone Cream (Small Tube))  1 applic TP BID [facility]


   Last Admin: 02/01/20 21:12 Dose:  Not Given


Diltiazem HCl (Cardizem -)  30 mg PO Q6HPO [facility]


   Last Admin: 02/02/20 05:32 Dose:  30 mg


Furosemide (Lasix Injection -)  40 mg IVPUSH DAILY [facility]


   Last Admin: 02/01/20 09:26 Dose:  40 mg


Gabapentin (Neurontin -)  300 mg PO TID [facility]


   Last Admin: 02/02/20 05:32 Dose:  300 mg


Guaifenesin (Diabetic Tussin Dm -)  10 ml PO Q4H [facility]


   Last Admin: 02/02/20 05:32 Dose:  10 ml


Heparin Sodium (Porcine) (Heparin -)  5,000 unit SQ BID [facility]


   Last Admin: 02/01/20 21:12 Dose:  5,000 unit


Hydrochlorothiazide (Hctz -)  12.5 mg PO DAILY [facility]


   Last Admin: 02/01/20 09:26 Dose:  12.5 mg


Ceftriaxone Sodium 1 gm/ (Dextrose)  50 mls @ 100 mls/hr IVPB DAILY [facility]


   Last Admin: 02/01/20 11:30 Dose:  100 mls/hr


Azithromycin (Zithromax 500mg Ivpb (Pre-Docked))  500 mg in 250 mls @ 250 mls/

hr IVPB DAILY [facility]


   Last Admin: 02/01/20 10:01 Dose:  250 mls/hr


Insulin Aspart (Novolog Vial Sliding Scale -)  1 vial SQ ACHS [facility]; Protocol


   Last Admin: 02/02/20 06:31 Dose:  2 units


Levothyroxine Sodium (Synthroid -)  150 mcg PO AM [facility]


   Last Admin: 02/02/20 06:32 Dose:  150 mcg


Losartan Potassium (Cozaar -)  50 mg PO DAILY [facility]


   Last Admin: 02/01/20 09:26 Dose:  50 mg


Methylprednisolone Sodium Succinate (Solu-Medrol -)  40 mg IVPUSH DAILY [facility]


Multi-Ingredient Ointment (Zinc Oxide)  1 applic TP BID [facility]


   Last Admin: 02/01/20 21:13 Dose:  Not Given











- Objective


Vital Signs: 


 Vital Signs











Temperature  98 F   02/02/20 06:48


 


Pulse Rate  89   02/02/20 06:48


 


Respiratory Rate  20   02/01/20 19:50


 


Blood Pressure  132/75   02/02/20 06:48


 


O2 Sat by Pulse Oximetry (%)  95   02/01/20 19:49











Constitutional: Yes: No Distress, Calm


Eyes: No: Sclera Icterus


HENT: No: Nasal Congestion


Cardiovascular: Yes: Regular Rate and Rhythm, JVD, S1, S2, Other (PMI non 

diplaced).  No: Gallop, Murmur


Respiratory: Yes: CTA Bilaterally, Wheezes.  No: Accessory Muscle Use, Rales


Gastrointestinal: Yes: Normal Bowel Sounds, Soft.  No: Tenderness


Musculoskeletal: Yes: Other (No kyphosis)


Extremities: No: Cyanosis


Edema: No


Integumentary: No: Jaundice


Neurological: Yes: Alert, Oriented (x3)


Psychiatric: No: Agitated


Labs: 


 CBC, BMP





 01/29/20 05:35 





 02/02/20 05:45 











Assessment/Plan





ECG x2: probable sinus with frequent APCs. RBBB, LAFB. + long QT. no path q's. 

nonsp ST-Ts--vs priors: the APCs are new, QT previously prolonged





Echo 1/20: borderline EF 50%, mild LW hypo. RV normal. mild-mod MR. RVSP 30-40





tele: sinus with APCs, brief runs PSVT

















fever:


-CXR clear, UA unremarkable. BCX pending


-plan per ID





acute diast CHF: 


-1/31 CXR pulm edema, bilat effusions, new vs prior--started IV lasix 40 qd


-no standing wts. renal fxn overall stable and at her baseline. diuresing well, 

sob improved but today a bit worse. JVD persists.


-incr lasix 40 iv bid. standing wts as pt can tolerate.


-echo preserved EF, valve fxn unremarkable, mild pulm HTN


-lateral hypokinesis noted--rec pharm MPI prior to discharge (once better 

diuresed)





PSVT


-brief runs on tele


-started on diltiazem for prophylaxis (avoiding b-b given active bronchospasm 

in copd pt)





prolonged QT:


-not a new finding (present on prior ECG 8/19, > 500 msec then as well).


-? effect of cilostazol (known offender with torsades risk associated) or 

Symbicort (formoterol component can prolong QT)--both meds presently being held


-K, Mag optimized


-repeat ECG difficult to measure QT as t wave runs into next p wave, however 

appears to still be prolonged, though likely improved vs prior (machine reading 

QTc 480s)--? congenital channelopathy


-cont lyte optimization, avoidance of QT prolonging meds





KITTY:


-improved


-baseline 1.4-1.6


-likely sepsis, +/- hypovolemia





copd:


-+ wheezing


-plan per pulm





periph vascular dz, venous ins'y:


-followed by wound center at [place]


-rx'd cilostazol for severe resting LE pains--sx much improved


-holding this med here sec to QT prolongation--monitor for claudication sx's





mult sclerosis:


-per primary





htn:


-overall stable


-same meds, observe trend.

## 2020-02-03 ENCOUNTER — APPOINTMENT (OUTPATIENT)
Dept: BREAST CENTER | Facility: CLINIC | Age: 85
End: 2020-02-03

## 2020-02-03 LAB
ALBUMIN SERPL-MCNC: 2.8 G/DL (ref 3.4–5)
ALP SERPL-CCNC: 66 U/L (ref 45–117)
ALT SERPL-CCNC: 54 U/L (ref 13–61)
ANION GAP SERPL CALC-SCNC: 8 MMOL/L (ref 8–16)
AST SERPL-CCNC: 25 U/L (ref 15–37)
BILIRUB SERPL-MCNC: 0.6 MG/DL (ref 0.2–1)
BUN SERPL-MCNC: 56.9 MG/DL (ref 7–18)
CALCIUM SERPL-MCNC: 9.3 MG/DL (ref 8.5–10.1)
CHLORIDE SERPL-SCNC: 96 MMOL/L (ref 98–107)
CO2 SERPL-SCNC: 37 MMOL/L (ref 21–32)
CREAT SERPL-MCNC: 1.7 MG/DL (ref 0.55–1.3)
GLUCOSE SERPL-MCNC: 272 MG/DL (ref 74–106)
MAGNESIUM SERPL-MCNC: 2.4 MG/DL (ref 1.8–2.4)
POTASSIUM SERPLBLD-SCNC: 4.2 MMOL/L (ref 3.5–5.1)
PROT SERPL-MCNC: 6.2 G/DL (ref 6.4–8.2)
SODIUM SERPL-SCNC: 141 MMOL/L (ref 136–145)

## 2020-02-03 RX ADMIN — IPRATROPIUM BROMIDE AND ALBUTEROL SULFATE SCH AMP: .5; 3 SOLUTION RESPIRATORY (INHALATION) at 16:11

## 2020-02-03 RX ADMIN — CLOTRIMAZOLE AND BETAMETHASONE DIPROPIONATE SCH APPLIC: 10; .5 CREAM TOPICAL at 10:52

## 2020-02-03 RX ADMIN — METHYLPREDNISOLONE SODIUM SUCCINATE SCH: 40 INJECTION, POWDER, FOR SOLUTION INTRAMUSCULAR; INTRAVENOUS at 21:20

## 2020-02-03 RX ADMIN — DILTIAZEM HYDROCHLORIDE SCH MG: 30 TABLET, FILM COATED ORAL at 06:07

## 2020-02-03 RX ADMIN — ASPIRIN 81 MG SCH MG: 81 TABLET ORAL at 10:51

## 2020-02-03 RX ADMIN — IPRATROPIUM BROMIDE AND ALBUTEROL SULFATE SCH AMP: .5; 3 SOLUTION RESPIRATORY (INHALATION) at 20:45

## 2020-02-03 RX ADMIN — DILTIAZEM HYDROCHLORIDE SCH MG: 30 TABLET, FILM COATED ORAL at 11:27

## 2020-02-03 RX ADMIN — GABAPENTIN SCH MG: 300 CAPSULE ORAL at 13:11

## 2020-02-03 RX ADMIN — GUAIFENESIN AND DEXTROMETHORPHAN HYDROBROMIDE SCH: 100; 10 SOLUTION ORAL at 01:22

## 2020-02-03 RX ADMIN — HEPARIN SODIUM SCH UNIT: 5000 INJECTION, SOLUTION INTRAVENOUS; SUBCUTANEOUS at 11:27

## 2020-02-03 RX ADMIN — METHYLPREDNISOLONE SODIUM SUCCINATE SCH MG: 40 INJECTION, POWDER, FOR SOLUTION INTRAMUSCULAR; INTRAVENOUS at 06:07

## 2020-02-03 RX ADMIN — GUAIFENESIN AND DEXTROMETHORPHAN HYDROBROMIDE SCH ML: 100; 10 SOLUTION ORAL at 21:53

## 2020-02-03 RX ADMIN — FUROSEMIDE SCH MG: 10 INJECTION, SOLUTION INTRAVENOUS at 06:07

## 2020-02-03 RX ADMIN — METHYLPREDNISOLONE SODIUM SUCCINATE SCH MG: 40 INJECTION, POWDER, FOR SOLUTION INTRAMUSCULAR; INTRAVENOUS at 17:39

## 2020-02-03 RX ADMIN — IPRATROPIUM BROMIDE AND ALBUTEROL SULFATE SCH AMP: .5; 3 SOLUTION RESPIRATORY (INHALATION) at 13:00

## 2020-02-03 RX ADMIN — GUAIFENESIN AND DEXTROMETHORPHAN HYDROBROMIDE SCH ML: 100; 10 SOLUTION ORAL at 17:38

## 2020-02-03 RX ADMIN — CEFTRIAXONE SCH MLS/HR: 1 INJECTION, POWDER, FOR SOLUTION INTRAMUSCULAR; INTRAVENOUS at 10:52

## 2020-02-03 RX ADMIN — ANASTROZOLE SCH MG: 1 TABLET, COATED ORAL at 11:04

## 2020-02-03 RX ADMIN — GUAIFENESIN AND DEXTROMETHORPHAN HYDROBROMIDE SCH ML: 100; 10 SOLUTION ORAL at 10:53

## 2020-02-03 RX ADMIN — INSULIN ASPART SCH UNITS: 100 INJECTION, SOLUTION INTRAVENOUS; SUBCUTANEOUS at 11:07

## 2020-02-03 RX ADMIN — LEVOTHYROXINE SODIUM SCH MCG: 150 TABLET ORAL at 06:07

## 2020-02-03 RX ADMIN — GABAPENTIN SCH MG: 300 CAPSULE ORAL at 21:52

## 2020-02-03 RX ADMIN — GUAIFENESIN AND DEXTROMETHORPHAN HYDROBROMIDE SCH: 100; 10 SOLUTION ORAL at 13:05

## 2020-02-03 RX ADMIN — CLOTRIMAZOLE AND BETAMETHASONE DIPROPIONATE SCH APPLIC: 10; .5 CREAM TOPICAL at 21:52

## 2020-02-03 RX ADMIN — FUROSEMIDE SCH MG: 10 INJECTION, SOLUTION INTRAVENOUS at 13:11

## 2020-02-03 RX ADMIN — GABAPENTIN SCH MG: 300 CAPSULE ORAL at 06:07

## 2020-02-03 RX ADMIN — INSULIN ASPART SCH UNITS: 100 INJECTION, SOLUTION INTRAVENOUS; SUBCUTANEOUS at 06:11

## 2020-02-03 RX ADMIN — GUAIFENESIN AND DEXTROMETHORPHAN HYDROBROMIDE SCH ML: 100; 10 SOLUTION ORAL at 06:03

## 2020-02-03 RX ADMIN — AZITHROMYCIN DIHYDRATE SCH MLS/HR: 500 INJECTION, POWDER, LYOPHILIZED, FOR SOLUTION INTRAVENOUS at 10:52

## 2020-02-03 RX ADMIN — DILTIAZEM HYDROCHLORIDE SCH MG: 30 TABLET, FILM COATED ORAL at 17:39

## 2020-02-03 RX ADMIN — IPRATROPIUM BROMIDE AND ALBUTEROL SULFATE SCH AMP: .5; 3 SOLUTION RESPIRATORY (INHALATION) at 08:27

## 2020-02-03 RX ADMIN — ATORVASTATIN CALCIUM SCH MG: 10 TABLET, FILM COATED ORAL at 21:52

## 2020-02-03 RX ADMIN — POTASSIUM CHLORIDE SCH MEQ: 20 SOLUTION ORAL at 10:52

## 2020-02-03 RX ADMIN — ZINC OXIDE SCH APPLIC: 200 OINTMENT TOPICAL at 10:52

## 2020-02-03 RX ADMIN — INSULIN ASPART SCH UNITS: 100 INJECTION, SOLUTION INTRAVENOUS; SUBCUTANEOUS at 17:45

## 2020-02-03 RX ADMIN — INSULIN ASPART SCH UNITS: 100 INJECTION, SOLUTION INTRAVENOUS; SUBCUTANEOUS at 21:52

## 2020-02-03 RX ADMIN — ZINC OXIDE SCH APPLIC: 200 OINTMENT TOPICAL at 21:52

## 2020-02-03 RX ADMIN — LOSARTAN POTASSIUM SCH MG: 50 TABLET, FILM COATED ORAL at 10:51

## 2020-02-03 NOTE — PN
Progress Note, Physician


Chief Complaint: 





sob


History of Present Illness: 





breathing is much better.


not quite back to baseline but almost.


urinated copiously yest





no cp, palp, swelling





- Current Medication List


Current Medications: 


Active Medications





Acetaminophen (Tylenol -)  650 mg PO Q6H PRN


   PRN Reason: MODERATE PAIN (4-7)


Albuterol Sulfate (Ventolin 0.083% Nebulizer Soln -)  1 amp NEB Q4H PRN


   PRN Reason: SHORT OF BREATH/WHEEZING


   Last Admin: 02/02/20 03:40 Dose:  1 amp


Albuterol/Ipratropium (Duoneb -)  1 amp NEB RQID [facility]


   Last Admin: 02/03/20 08:27 Dose:  1 amp


Anastrozole (Arimidex -)  1 mg PO DAILY [facility]


   Last Admin: 02/03/20 11:04 Dose:  1 mg


Aspirin (Asa -)  81 mg PO DAILY [facility]


   Last Admin: 02/03/20 10:51 Dose:  81 mg


Atorvastatin Calcium (Lipitor -)  10 mg PO HS [facility]


   Last Admin: 02/02/20 21:36 Dose:  10 mg


Clotrimazole (Lotrisone Cream (Small Tube))  1 applic TP BID [facility]


   Last Admin: 02/03/20 10:52 Dose:  1 applic


Diltiazem HCl (Cardizem -)  30 mg PO Q6HPO [facility]


   Last Admin: 02/03/20 06:07 Dose:  30 mg


Furosemide (Lasix Injection -)  40 mg IVPUSH BIDLASIX [facility]


   Last Admin: 02/03/20 06:07 Dose:  40 mg


Gabapentin (Neurontin -)  300 mg PO TID [facility]


   Last Admin: 02/03/20 06:07 Dose:  300 mg


Guaifenesin (Diabetic Tussin Dm -)  10 ml PO Q4H [facility]


   Last Admin: 02/03/20 10:53 Dose:  10 ml


Heparin Sodium (Porcine) (Heparin -)  5,000 unit SQ BID [facility]


   Last Admin: 02/02/20 21:35 Dose:  5,000 unit


Ceftriaxone Sodium 1 gm/ (Dextrose)  50 mls @ 100 mls/hr IVPB DAILY [facility]


   Last Admin: 02/03/20 10:52 Dose:  100 mls/hr


Azithromycin (Zithromax 500mg Ivpb (Pre-Docked))  500 mg in 250 mls @ 250 mls/

hr IVPB DAILY [facility]


   Last Admin: 02/03/20 10:52 Dose:  250 mls/hr


Insulin Aspart (Novolog Vial Sliding Scale -)  1 vial SQ ACHS [facility]; Protocol


   Last Admin: 02/03/20 11:07 Dose:  4 units


Levothyroxine Sodium (Synthroid -)  150 mcg PO AM [facility]


   Last Admin: 02/03/20 06:07 Dose:  150 mcg


Losartan Potassium (Cozaar -)  50 mg PO DAILY [facility]


   Last Admin: 02/03/20 10:51 Dose:  50 mg


Methylprednisolone Sodium Succinate (Solu-Medrol -)  40 mg IVPUSH Q12H [facility]


   Last Admin: 02/03/20 06:07 Dose:  40 mg


Multi-Ingredient Ointment (Zinc Oxide)  1 applic TP BID [facility]


   Last Admin: 02/03/20 10:52 Dose:  1 applic


Potassium Chloride (Potassium Chloride Oral Liquid)  40 meq PO DAILY [facility]


   Last Admin: 02/03/20 10:52 Dose:  40 meq











- Objective


Vital Signs: 


 Vital Signs











Temperature  99.8 F H  02/03/20 06:00


 


Pulse Rate  83   02/03/20 06:00


 


Respiratory Rate  20 02/03/20 06:00


 


Blood Pressure  170/74   02/03/20 06:00


 


O2 Sat by Pulse Oximetry (%)  92 L  02/02/20 21:00











Constitutional: Yes: Well Nourished, No Distress, Calm


Cardiovascular: Yes: Regular Rate and Rhythm, S1, S2.  No: JVD, Gallop, Murmur


Respiratory: Yes: Regular, Wheezes.  No: Accessory Muscle Use, Rales


Extremities: No: Cold


Edema: No


Neurological: Yes: Alert, Oriented


Psychiatric: No: Agitated


Labs: 


 CBC, BMP





 01/29/20 05:35 





 02/03/20 05:45 











Assessment/Plan





ECG x2: probable sinus with frequent APCs. RBBB, LAFB. + long QT. no path q's. 

nonsp ST-Ts--vs priors: the APCs are new, QT previously prolonged





Echo 1/20: borderline EF 50%, mild LW hypo. RV normal. mild-mod MR. RVSP 30-40





tele: sinus

















fever:


-CXR clear, UA unremarkable. BCX pending


-plan per ID





acute diast CHF: 


-1/31 CXR pulm edema, bilat effusions, new vs prior--started IV lasix 40 qd


-no standing wts. renal fxn overall stable and at her baseline. diuresing well, 

sob improved but today a bit worse. JVD persists.


-incr'd lasix 40 iv bid on 2/2. marked subjective diuretic response. no 

standing weights. JVD and SOB both improved. creat up slightly. repeat CXR--if 

Xray cleared, will change to lasix 40 po bid


-echo preserved EF, valve fxn unremarkable, mild pulm HTN


-lateral hypokinesis noted--rec pharm MPI tomorrow





PSVT


-brief runs on tele


-started on diltiazem for prophylaxis (avoiding b-b given active bronchospasm 

in copd pt)





prolonged QT:


-not a new finding (present on prior ECG 8/19, > 500 msec then as well).


-? effect of cilostazol (known offender with torsades risk associated) or 

Symbicort (formoterol component can prolong QT)--both meds presently being held


-K, Mag optimized


-repeat ECG difficult to measure QT as t wave runs into next p wave, however 

appears to still be prolonged, though likely improved vs prior (machine reading 

QTc 480s)--? congenital channelopathy


-cont lyte optimization, avoidance of QT prolonging meds





KITTY:


-? sec sepsis, vs hypovolemia--improved to baseline


-improved


-baseline 1.4-1.6





htn:


-mostly controlled


-same meds, observe trend.





copd:


-+ wheezing


-plan per pulm





periph vascular dz, venous ins'y:


-followed by wound center at [place]


-rx'd cilostazol for severe resting LE pains--sx much improved


-holding this med here sec to QT prolongation--monitor for claudication sx's





mult sclerosis:


-per primary

## 2020-02-03 NOTE — PN
Progress Note, Physician


Chief Complaint: 


patient examined, less dyspnea compared to yesterday and since increase in the 

dose of SoluCortef to 40 mg bid, cough is still present and productive





History of Present Illness: 





89 admitted for high fever, shortness of breath and cough . She is receiving iv 

antibiotics ( Ceftriaxone and Zithromax), and SoluCortef . Metoprolol was 

switched to Cardizem for rate control.Patient's HR is much better, and below 

100 b/min








- Current Medication List


Current Medications: 


Active Medications





Acetaminophen (Tylenol -)  650 mg PO Q6H PRN


   PRN Reason: MODERATE PAIN (4-7)


Albuterol Sulfate (Ventolin 0.083% Nebulizer Soln -)  1 amp NEB Q4H PRN


   PRN Reason: SHORT OF BREATH/WHEEZING


   Last Admin: 02/02/20 03:40 Dose:  1 amp


Albuterol/Ipratropium (Duoneb -)  1 amp NEB RQID [facility]


   Last Admin: 02/03/20 08:27 Dose:  1 amp


Anastrozole (Arimidex -)  1 mg PO DAILY [facility]


   Last Admin: 02/02/20 10:44 Dose:  1 mg


Aspirin (Asa -)  81 mg PO DAILY [facility]


   Last Admin: 02/02/20 10:44 Dose:  81 mg


Atorvastatin Calcium (Lipitor -)  10 mg PO HS [facility]


   Last Admin: 02/02/20 21:36 Dose:  10 mg


Clotrimazole (Lotrisone Cream (Small Tube))  1 applic TP BID [facility]


   Last Admin: 02/02/20 21:43 Dose:  1 applic


Diltiazem HCl (Cardizem -)  30 mg PO Q6HPO [facility]


   Last Admin: 02/03/20 06:07 Dose:  30 mg


Furosemide (Lasix Injection -)  40 mg IVPUSH BIDLASIX [facility]


   Last Admin: 02/03/20 06:07 Dose:  40 mg


Gabapentin (Neurontin -)  300 mg PO TID [facility]


   Last Admin: 02/03/20 06:07 Dose:  300 mg


Guaifenesin (Diabetic Tussin Dm -)  10 ml PO Q4H [facility]


   Last Admin: 02/03/20 06:03 Dose:  10 ml


Heparin Sodium (Porcine) (Heparin -)  5,000 unit SQ BID [facility]


   Last Admin: 02/02/20 21:35 Dose:  5,000 unit


Ceftriaxone Sodium 1 gm/ (Dextrose)  50 mls @ 100 mls/hr IVPB DAILY [facility]


   Last Admin: 02/02/20 12:51 Dose:  100 mls/hr


Azithromycin (Zithromax 500mg Ivpb (Pre-Docked))  500 mg in 250 mls @ 250 mls/

hr IVPB DAILY [facility]


   Last Admin: 02/02/20 10:45 Dose:  250 mls/hr


Insulin Aspart (Novolog Vial Sliding Scale -)  1 vial SQ ACHS [facility]; Protocol


   Last Admin: 02/03/20 06:11 Dose:  6 units


Levothyroxine Sodium (Synthroid -)  150 mcg PO AM [facility]


   Last Admin: 02/03/20 06:07 Dose:  150 mcg


Losartan Potassium (Cozaar -)  50 mg PO DAILY [facility]


   Last Admin: 02/02/20 10:44 Dose:  50 mg


Methylprednisolone Sodium Succinate (Solu-Medrol -)  40 mg IVPUSH Q12H [facility]


   Last Admin: 02/03/20 06:07 Dose:  40 mg


Multi-Ingredient Ointment (Zinc Oxide)  1 applic TP BID [facility]


   Last Admin: 02/02/20 21:43 Dose:  1 applic


Potassium Chloride (Potassium Chloride Oral Liquid)  40 meq PO DAILY [facility]


   Last Admin: 02/02/20 10:45 Dose:  40 meq











- Objective


Vital Signs: 


 Vital Signs











Temperature  99.8 F H  02/03/20 06:00


 


Pulse Rate  83   02/03/20 06:00


 


Respiratory Rate  20 02/03/20 06:00


 


Blood Pressure  170/74   02/03/20 06:00


 


O2 Sat by Pulse Oximetry (%)  92 L  02/02/20 21:00











Constitutional: Yes: No Distress, Calm


Eyes: Yes: Conjunctiva Clear, EOM Intact


HENT: Yes: Atraumatic, Normocephalic


Neck: Yes: Supple, Trachea Midline


Cardiovascular: Yes: Regular Rate and Rhythm


Respiratory: Yes: On Nasal O2, SOB (decreased compared to yesterday), Wheezes (

at bases and mid lungs bilaterally)


Gastrointestinal: Yes: Normal Bowel Sounds, Soft, Abdomen, Obese


Genitourinary: Yes: Other (incontinent)


Labs: 


 CBC, BMP





 01/29/20 05:35 





 02/03/20 05:45 











Problem List





- Problems


(1) Arrhythmia


Assessment/Plan: 


sinus tachycardia


controlled now with  CArdizem








Code(s): I49.9 - CARDIAC ARRHYTHMIA, UNSPECIFIED   





(2) COPD with acute exacerbation


Assessment/Plan: 


continue SoluCortef, Zithromax Ceftriaxone, nebs


Code(s): J44.1 - CHRONIC OBSTRUCTIVE PULMONARY DISEASE W (ACUTE) EXACERBATION   





(3) Chronic kidney failure


Assessment/Plan: 


Lasix 40 mg iv administered


will continue monitoring renal function


Code(s): N18.9 - CHRONIC KIDNEY DISEASE, UNSPECIFIED   





(4) Diabetes mellitus type 2 in obese


Assessment/Plan: 


accuchecks  AC and HS with regular coverage


Code(s): E11.69 - TYPE 2 DIABETES MELLITUS WITH OTHER SPECIFIED COMPLICATION; 

E66.9 - OBESITY, UNSPECIFIED   





(5) Pulmonary edema


Assessment/Plan: 


CXR stat





EKG


Code(s): J81.1 - CHRONIC PULMONARY EDEMA

## 2020-02-03 NOTE — PN
Progress Note (short form)





- Note


Progress Note: 


OOB to chair. 


Cough, CONTEH, and wheezing are improving.  


No acute events overnight. 





 Intake & Output











 01/31/20 02/01/20 02/02/20 02/03/20





 23:59 23:59 23:59 23:59


 


Intake Total 350 1290 675 


 


Output Total  2000 1500 


 


Balance 350 -710 -825 








 Last Vital Signs











Temp Pulse Resp BP Pulse Ox


 


 99.8 F H  83   20   170/74   92 L


 


 02/03/20 06:00  02/03/20 06:00  02/03/20 06:00  02/03/20 06:00  02/02/20 21:00








Active Medications





Acetaminophen (Tylenol -)  650 mg PO Q6H PRN


   PRN Reason: MODERATE PAIN (4-7)


Albuterol Sulfate (Ventolin 0.083% Nebulizer Soln -)  1 amp NEB Q4H PRN


   PRN Reason: SHORT OF BREATH/WHEEZING


   Last Admin: 02/02/20 03:40 Dose:  1 amp


Albuterol/Ipratropium (Duoneb -)  1 amp NEB RQID [facility]


   Last Admin: 02/03/20 08:27 Dose:  1 amp


Anastrozole (Arimidex -)  1 mg PO DAILY [facility]


   Last Admin: 02/03/20 11:04 Dose:  1 mg


Aspirin (Asa -)  81 mg PO DAILY [facility]


   Last Admin: 02/03/20 10:51 Dose:  81 mg


Atorvastatin Calcium (Lipitor -)  10 mg PO HS [facility]


   Last Admin: 02/02/20 21:36 Dose:  10 mg


Clotrimazole (Lotrisone Cream (Small Tube))  1 applic TP BID [facility]


   Last Admin: 02/03/20 10:52 Dose:  1 applic


Diltiazem HCl (Cardizem -)  30 mg PO Q6HPO [facility]


   Last Admin: 02/03/20 11:27 Dose:  30 mg


Furosemide (Lasix Injection -)  40 mg IVPUSH BIDLASIX [facility]


   Last Admin: 02/03/20 06:07 Dose:  40 mg


Gabapentin (Neurontin -)  300 mg PO TID [facility]


   Last Admin: 02/03/20 06:07 Dose:  300 mg


Guaifenesin (Diabetic Tussin Dm -)  10 ml PO Q4H [facility]


   Last Admin: 02/03/20 10:53 Dose:  10 ml


Heparin Sodium (Porcine) (Heparin -)  5,000 unit SQ BID [facility]


   Last Admin: 02/03/20 11:27 Dose:  5,000 unit


Ceftriaxone Sodium 1 gm/ (Dextrose)  50 mls @ 100 mls/hr IVPB DAILY [facility]


   Last Admin: 02/03/20 10:52 Dose:  100 mls/hr


Azithromycin (Zithromax 500mg Ivpb (Pre-Docked))  500 mg in 250 mls @ 250 mls/

hr IVPB DAILY [facility]


   Last Admin: 02/03/20 10:52 Dose:  250 mls/hr


Insulin Aspart (Novolog Vial Sliding Scale -)  1 vial SQ ACHS [facility]; Protocol


   Last Admin: 02/03/20 11:07 Dose:  4 units


Levothyroxine Sodium (Synthroid -)  150 mcg PO AM [facility]


   Last Admin: 02/03/20 06:07 Dose:  150 mcg


Losartan Potassium (Cozaar -)  50 mg PO DAILY [facility]


   Last Admin: 02/03/20 10:51 Dose:  50 mg


Methylprednisolone Sodium Succinate (Solu-Medrol -)  40 mg IVPUSH Q12H [facility]


   Last Admin: 02/03/20 06:07 Dose:  40 mg


Multi-Ingredient Ointment (Zinc Oxide)  1 applic TP BID [facility]


   Last Admin: 02/03/20 10:52 Dose:  1 applic


Potassium Chloride (Potassium Chloride Oral Liquid)  40 meq PO DAILY [facility]


   Last Admin: 02/03/20 10:52 Dose:  40 meq











Gen:  NAD 


Heart: RRR


Lung: Bibasilar rhonchi/rales, no wheezes


Abd: soft, nontender


Ext: + edema





 Laboratory Results - last 24 hr











  02/02/20 02/02/20 02/02/20





  12:36 17:48 21:33


 


Sodium   


 


Potassium   


 


Chloride   


 


Carbon Dioxide   


 


Anion Gap   


 


BUN   


 


Creatinine   


 


Est GFR (CKD-EPI)AfAm   


 


Est GFR (CKD-EPI)NonAf   


 


POC Glucometer  265  285  295


 


Random Glucose   


 


Calcium   


 


Magnesium   


 


Total Bilirubin   


 


AST   


 


ALT   


 


Alkaline Phosphatase   


 


Total Protein   


 


Albumin   














  02/03/20 02/03/20 02/03/20





  05:45 06:10 11:06


 


Sodium  141  


 


Potassium  4.2  


 


Chloride  96 L  


 


Carbon Dioxide  37 H  


 


Anion Gap  8  


 


BUN  56.9 H  


 


Creatinine  1.7 H  


 


Est GFR (CKD-EPI)AfAm  30.45  


 


Est GFR (CKD-EPI)NonAf  26.28  


 


POC Glucometer   256  244


 


Random Glucose  272 H  


 


Calcium  9.3  


 


Magnesium  2.4  


 


Total Bilirubin  0.6  


 


AST  25  


 


ALT  54  


 


Alkaline Phosphatase  66  


 


Total Protein  6.2 L  


 


Albumin  2.8 L  

















Problem List





- Problems


(1) COPD with acute exacerbation


Code(s): J44.1 - CHRONIC OBSTRUCTIVE PULMONARY DISEASE W (ACUTE) EXACERBATION   








A/P


URI


Acute COPD Exacerbation


Sepsis


Lactic Acidosis


Acute on Chronic Renal Failure


HTN


Hyperlipidemia


Anemia





-  Lasix 


-  ABX per ID


-  Medrol 


-  inhaled bronchodilators


-  O2 to keep Spo2 >90%


-  monitor urine output, creatinine


-  DVT prophylaxis





Dr Fritz

## 2020-02-04 LAB
ANION GAP SERPL CALC-SCNC: 8 MMOL/L (ref 8–16)
BUN SERPL-MCNC: 65 MG/DL (ref 7–18)
CALCIUM SERPL-MCNC: 9 MG/DL (ref 8.5–10.1)
CHLORIDE SERPL-SCNC: 94 MMOL/L (ref 98–107)
CO2 SERPL-SCNC: 37 MMOL/L (ref 21–32)
CREAT SERPL-MCNC: 1.7 MG/DL (ref 0.55–1.3)
GLUCOSE SERPL-MCNC: 216 MG/DL (ref 74–106)
POTASSIUM SERPLBLD-SCNC: 4.5 MMOL/L (ref 3.5–5.1)
SODIUM SERPL-SCNC: 140 MMOL/L (ref 136–145)

## 2020-02-04 RX ADMIN — ASPIRIN 81 MG SCH MG: 81 TABLET ORAL at 13:58

## 2020-02-04 RX ADMIN — DILTIAZEM HYDROCHLORIDE SCH MG: 30 TABLET, FILM COATED ORAL at 00:42

## 2020-02-04 RX ADMIN — METHYLPREDNISOLONE SODIUM SUCCINATE SCH MG: 40 INJECTION, POWDER, FOR SOLUTION INTRAMUSCULAR; INTRAVENOUS at 18:09

## 2020-02-04 RX ADMIN — IPRATROPIUM BROMIDE AND ALBUTEROL SULFATE SCH: .5; 3 SOLUTION RESPIRATORY (INHALATION) at 13:00

## 2020-02-04 RX ADMIN — LOSARTAN POTASSIUM SCH: 50 TABLET, FILM COATED ORAL at 11:27

## 2020-02-04 RX ADMIN — DILTIAZEM HYDROCHLORIDE SCH MG: 30 TABLET, FILM COATED ORAL at 17:36

## 2020-02-04 RX ADMIN — INSULIN ASPART SCH UNITS: 100 INJECTION, SOLUTION INTRAVENOUS; SUBCUTANEOUS at 14:16

## 2020-02-04 RX ADMIN — GUAIFENESIN AND DEXTROMETHORPHAN HYDROBROMIDE SCH ML: 100; 10 SOLUTION ORAL at 21:56

## 2020-02-04 RX ADMIN — AZITHROMYCIN DIHYDRATE SCH MLS/HR: 500 INJECTION, POWDER, LYOPHILIZED, FOR SOLUTION INTRAVENOUS at 13:57

## 2020-02-04 RX ADMIN — CLOTRIMAZOLE AND BETAMETHASONE DIPROPIONATE SCH APPLIC: 10; .5 CREAM TOPICAL at 21:55

## 2020-02-04 RX ADMIN — INSULIN ASPART SCH UNITS: 100 INJECTION, SOLUTION INTRAVENOUS; SUBCUTANEOUS at 22:02

## 2020-02-04 RX ADMIN — ALBUTEROL SULFATE PRN AMP: 2.5 SOLUTION RESPIRATORY (INHALATION) at 08:10

## 2020-02-04 RX ADMIN — IPRATROPIUM BROMIDE AND ALBUTEROL SULFATE SCH AMP: .5; 3 SOLUTION RESPIRATORY (INHALATION) at 07:15

## 2020-02-04 RX ADMIN — GUAIFENESIN AND DEXTROMETHORPHAN HYDROBROMIDE SCH: 100; 10 SOLUTION ORAL at 10:00

## 2020-02-04 RX ADMIN — IPRATROPIUM BROMIDE AND ALBUTEROL SULFATE SCH AMP: .5; 3 SOLUTION RESPIRATORY (INHALATION) at 20:29

## 2020-02-04 RX ADMIN — DILTIAZEM HYDROCHLORIDE SCH MG: 30 TABLET, FILM COATED ORAL at 14:02

## 2020-02-04 RX ADMIN — DILTIAZEM HYDROCHLORIDE SCH MG: 30 TABLET, FILM COATED ORAL at 06:37

## 2020-02-04 RX ADMIN — GUAIFENESIN AND DEXTROMETHORPHAN HYDROBROMIDE SCH ML: 100; 10 SOLUTION ORAL at 17:36

## 2020-02-04 RX ADMIN — ZINC OXIDE SCH APPLIC: 200 OINTMENT TOPICAL at 13:58

## 2020-02-04 RX ADMIN — GABAPENTIN SCH MG: 300 CAPSULE ORAL at 14:19

## 2020-02-04 RX ADMIN — POTASSIUM CHLORIDE SCH MEQ: 20 SOLUTION ORAL at 13:57

## 2020-02-04 RX ADMIN — GUAIFENESIN AND DEXTROMETHORPHAN HYDROBROMIDE SCH: 100; 10 SOLUTION ORAL at 00:42

## 2020-02-04 RX ADMIN — ANASTROZOLE SCH MG: 1 TABLET, COATED ORAL at 13:58

## 2020-02-04 RX ADMIN — CEFTRIAXONE SCH MLS/HR: 1 INJECTION, POWDER, FOR SOLUTION INTRAMUSCULAR; INTRAVENOUS at 13:57

## 2020-02-04 RX ADMIN — GABAPENTIN SCH MG: 300 CAPSULE ORAL at 21:55

## 2020-02-04 RX ADMIN — ZINC OXIDE SCH APPLIC: 200 OINTMENT TOPICAL at 21:55

## 2020-02-04 RX ADMIN — FUROSEMIDE SCH MG: 10 INJECTION, SOLUTION INTRAVENOUS at 06:37

## 2020-02-04 RX ADMIN — GABAPENTIN SCH MG: 300 CAPSULE ORAL at 06:37

## 2020-02-04 RX ADMIN — CLOTRIMAZOLE AND BETAMETHASONE DIPROPIONATE SCH APPLIC: 10; .5 CREAM TOPICAL at 13:58

## 2020-02-04 RX ADMIN — INSULIN ASPART SCH UNITS: 100 INJECTION, SOLUTION INTRAVENOUS; SUBCUTANEOUS at 06:37

## 2020-02-04 RX ADMIN — GUAIFENESIN AND DEXTROMETHORPHAN HYDROBROMIDE SCH ML: 100; 10 SOLUTION ORAL at 14:18

## 2020-02-04 RX ADMIN — LOSARTAN POTASSIUM SCH MG: 50 TABLET, FILM COATED ORAL at 08:10

## 2020-02-04 RX ADMIN — FUROSEMIDE SCH MG: 10 INJECTION, SOLUTION INTRAVENOUS at 14:18

## 2020-02-04 RX ADMIN — LEVOTHYROXINE SODIUM SCH MCG: 150 TABLET ORAL at 06:37

## 2020-02-04 RX ADMIN — GUAIFENESIN AND DEXTROMETHORPHAN HYDROBROMIDE SCH: 100; 10 SOLUTION ORAL at 06:38

## 2020-02-04 RX ADMIN — METHYLPREDNISOLONE SODIUM SUCCINATE SCH MG: 40 INJECTION, POWDER, FOR SOLUTION INTRAMUSCULAR; INTRAVENOUS at 06:37

## 2020-02-04 RX ADMIN — INSULIN ASPART SCH UNITS: 100 INJECTION, SOLUTION INTRAVENOUS; SUBCUTANEOUS at 17:36

## 2020-02-04 RX ADMIN — IPRATROPIUM BROMIDE AND ALBUTEROL SULFATE SCH AMP: .5; 3 SOLUTION RESPIRATORY (INHALATION) at 16:12

## 2020-02-04 RX ADMIN — ATORVASTATIN CALCIUM SCH MG: 10 TABLET, FILM COATED ORAL at 21:55

## 2020-02-04 NOTE — PN
Progress Note (short form)





- Note


Progress Note: 


Resting in NAD on NC O2.  


Cough, CONTEH, and wheezing are improving.  


No acute events overnight. 





 Intake & Output











 02/01/20 02/02/20 02/03/20 02/04/20





 23:59 23:59 23:59 23:59


 


Intake Total 1290 675 250 30


 


Output Total 2000 1500 1200 


 


Balance -710 -825 -950 30











 Last Vital Signs











Temp Pulse Resp BP Pulse Ox


 


 97.8 F   85   19   156/69   94 L


 


 02/04/20 08:05  02/04/20 08:05  02/04/20 08:05  02/04/20 08:05  02/03/20 21:00








Active Medications





Acetaminophen (Tylenol -)  650 mg PO Q6H PRN


   PRN Reason: MODERATE PAIN (4-7)


Albuterol Sulfate (Ventolin 0.083% Nebulizer Soln -)  1 amp NEB Q4H PRN


   PRN Reason: SHORT OF BREATH/WHEEZING


   Last Admin: 02/02/20 03:40 Dose:  1 amp


Albuterol/Ipratropium (Duoneb -)  1 amp NEB RQID [facility]


   Last Admin: 02/04/20 07:15 Dose:  1 amp


Anastrozole (Arimidex -)  1 mg PO DAILY [facility]


   Last Admin: 02/04/20 13:58 Dose:  1 mg


Aspirin (Asa -)  81 mg PO DAILY [facility]


   Last Admin: 02/04/20 13:58 Dose:  81 mg


Atorvastatin Calcium (Lipitor -)  10 mg PO HS [facility]


   Last Admin: 02/03/20 21:52 Dose:  10 mg


Clotrimazole (Lotrisone Cream (Small Tube))  1 applic TP BID [facility]


   Last Admin: 02/04/20 13:58 Dose:  1 applic


Diltiazem HCl (Cardizem -)  30 mg PO Q6HPO [facility]


   Stop: 02/04/20 23:59


   Last Admin: 02/04/20 14:02 Dose:  30 mg


Diltiazem HCl (Cardizem Cd -)  120 mg PO DAILY [facility]


Furosemide (Lasix Injection -)  40 mg IVPUSH BIDLASIX [facility]


   Last Admin: 02/04/20 14:18 Dose:  40 mg


Gabapentin (Neurontin -)  300 mg PO TID [facility]


   Last Admin: 02/04/20 14:19 Dose:  300 mg


Guaifenesin (Diabetic Tussin Dm -)  10 ml PO Q4H [facility]


   Last Admin: 02/04/20 14:18 Dose:  10 ml


Ceftriaxone Sodium 1 gm/ (Dextrose)  50 mls @ 100 mls/hr IVPB DAILY [facility]


   Last Admin: 02/04/20 13:57 Dose:  100 mls/hr


Azithromycin (Zithromax 500mg Ivpb (Pre-Docked))  500 mg in 250 mls @ 250 mls/

hr IVPB DAILY [facility]


   Last Admin: 02/04/20 13:57 Dose:  250 mls/hr


Insulin Aspart (Novolog Vial Sliding Scale -)  1 vial SQ ACHS [facility]; Protocol


   Last Admin: 02/04/20 14:16 Dose:  4 units


Levothyroxine Sodium (Synthroid -)  150 mcg PO AM [facility]


   Last Admin: 02/04/20 06:37 Dose:  150 mcg


Losartan Potassium (Cozaar -)  50 mg PO DAILY [facility]


   Last Admin: 02/04/20 11:27 Dose:  Not Given


Methylprednisolone Sodium Succinate (Solu-Medrol -)  40 mg IVPUSH Q12H [facility]


   Last Admin: 02/04/20 06:37 Dose:  40 mg


Multi-Ingredient Ointment (Zinc Oxide)  1 applic TP BID [facility]


   Last Admin: 02/04/20 13:58 Dose:  1 applic


Potassium Chloride (Potassium Chloride Oral Liquid)  40 meq PO DAILY [facility]


   Last Admin: 02/04/20 13:57 Dose:  40 meq








Gen:  NAD 


Heart: RRR


Lung: Bibasilar rhonchi/rales, no wheezes


Abd: soft, nontender


Ext: + edema





 Laboratory Results - last 24 hr











  02/03/20 02/03/20 02/04/20





  17:44 21:48 05:46


 


Sodium    140


 


Potassium    4.5


 


Chloride    94 L


 


Carbon Dioxide    37 H


 


Anion Gap    8


 


BUN    65.0 H


 


Creatinine    1.7 H


 


Est GFR (CKD-EPI)AfAm    30.45


 


Est GFR (CKD-EPI)NonAf    26.28


 


POC Glucometer  296  357 


 


Random Glucose    216 H


 


Calcium    9.0














  02/04/20 02/04/20





  06:36 14:10


 


Sodium  


 


Potassium  


 


Chloride  


 


Carbon Dioxide  


 


Anion Gap  


 


BUN  


 


Creatinine  


 


Est GFR (CKD-EPI)AfAm  


 


Est GFR (CKD-EPI)NonAf  


 


POC Glucometer  220  215


 


Random Glucose  


 


Calcium  




















Problem List





- Problems


(1) COPD with acute exacerbation


Code(s): J44.1 - CHRONIC OBSTRUCTIVE PULMONARY DISEASE W (ACUTE) EXACERBATION   








A/P


URI


Acute COPD Exacerbation


Sepsis


Lactic Acidosis


Acute on Chronic Renal Failure


HTN


Hyperlipidemia


Anemia





-  Lasix 


-  ABX per ID


-  Can likley change to Prednisone in AM 


-  inhaled bronchodilators


-  O2 to keep Spo2 >90%


-  monitor urine output, creatinine


-  DVT prophylaxis





Dr Fritz

## 2020-02-04 NOTE — PN
Progress Note, Physician


Chief Complaint: 


patient had nuclear stress test today which showed old apical defect


clinically dyspnea is persisting, there is sporadic cough, there is no wheezing 

and no fever





History of Present Illness: 





89 admitted for Sepsis with high fever, shortness of breath, cough and sinus 

arrhythmia with HR up to 160 b/min.Patient is receiving iv antibiotics ( 

Ceftriaxone and Zithromax), and SoluCortef . CArdizem long acting for rate 

control was started with good control, and a decrease in HR below 100 b/min








- Current Medication List


Current Medications: 


Active Medications





Acetaminophen (Tylenol -)  650 mg PO Q6H PRN


   PRN Reason: MODERATE PAIN (4-7)


Albuterol Sulfate (Ventolin 0.083% Nebulizer Soln -)  1 amp NEB Q4H PRN


   PRN Reason: SHORT OF BREATH/WHEEZING


   Last Admin: 02/02/20 03:40 Dose:  1 amp


Albuterol/Ipratropium (Duoneb -)  1 amp NEB RQID [facility]


   Last Admin: 02/04/20 07:15 Dose:  1 amp


Anastrozole (Arimidex -)  1 mg PO DAILY [facility]


   Last Admin: 02/04/20 13:58 Dose:  1 mg


Aspirin (Asa -)  81 mg PO DAILY [facility]


   Last Admin: 02/04/20 13:58 Dose:  81 mg


Atorvastatin Calcium (Lipitor -)  10 mg PO HS [facility]


   Last Admin: 02/03/20 21:52 Dose:  10 mg


Clotrimazole (Lotrisone Cream (Small Tube))  1 applic TP BID [facility]


   Last Admin: 02/04/20 13:58 Dose:  1 applic


Diltiazem HCl (Cardizem -)  30 mg PO Q6HPO [facility]


   Stop: 02/04/20 23:59


   Last Admin: 02/04/20 14:02 Dose:  30 mg


Diltiazem HCl (Cardizem Cd -)  120 mg PO DAILY [facility]


Furosemide (Lasix Injection -)  40 mg IVPUSH BIDLASIX [facility]


   Last Admin: 02/04/20 14:18 Dose:  40 mg


Gabapentin (Neurontin -)  300 mg PO TID [facility]


   Last Admin: 02/04/20 14:19 Dose:  300 mg


Guaifenesin (Diabetic Tussin Dm -)  10 ml PO Q4H [facility]


   Last Admin: 02/04/20 14:18 Dose:  10 ml


Ceftriaxone Sodium 1 gm/ (Dextrose)  50 mls @ 100 mls/hr IVPB DAILY [facility]


   Last Admin: 02/04/20 13:57 Dose:  100 mls/hr


Azithromycin (Zithromax 500mg Ivpb (Pre-Docked))  500 mg in 250 mls @ 250 mls/

hr IVPB DAILY [facility]


   Last Admin: 02/04/20 13:57 Dose:  250 mls/hr


Insulin Aspart (Novolog Vial Sliding Scale -)  1 vial SQ ACHS [facility]; Protocol


   Last Admin: 02/04/20 14:16 Dose:  4 units


Levothyroxine Sodium (Synthroid -)  150 mcg PO AM [facility]


   Last Admin: 02/04/20 06:37 Dose:  150 mcg


Losartan Potassium (Cozaar -)  50 mg PO DAILY [facility]


   Last Admin: 02/04/20 11:27 Dose:  Not Given


Methylprednisolone Sodium Succinate (Solu-Medrol -)  40 mg IVPUSH Q12H [facility]


   Last Admin: 02/04/20 06:37 Dose:  40 mg


Multi-Ingredient Ointment (Zinc Oxide)  1 applic TP BID [facility]


   Last Admin: 02/04/20 13:58 Dose:  1 applic


Potassium Chloride (Potassium Chloride Oral Liquid)  40 meq PO DAILY [facility]


   Last Admin: 02/04/20 13:57 Dose:  40 meq











- Objective


Vital Signs: 


 Vital Signs











Temperature  97.7 F   02/04/20 15:00


 


Pulse Rate  95 H  02/04/20 15:00


 


Respiratory Rate  20 02/04/20 15:00


 


Blood Pressure  138/62   02/04/20 15:00


 


O2 Sat by Pulse Oximetry (%)  93 L  02/04/20 09:00











Constitutional: Yes: No Distress, Calm


Eyes: Yes: Conjunctiva Clear, EOM Intact


HENT: Yes: Atraumatic, Normocephalic


Neck: Yes: Supple, Trachea Midline


Cardiovascular: Yes: Regular Rate and Rhythm, S1, S2


Respiratory: Yes: Regular, CTA Bilaterally, Rhonchi (decreased and scattered)


Gastrointestinal: Yes: Normal Bowel Sounds, Soft, Abdomen, Obese.  No: 

Hepatomegaly, Splenomegaly


Musculoskeletal: Yes: WNL


Extremities: No: Calf Tenderness


Edema: No


Peripheral Pulses WNL: Yes


Integumentary: Yes: Erythema (of bothe anterior shins, scaly descuamation of 

the skin on the shins)


Neurological: Yes: Alert, Oriented, Weakness


Psychiatric: Yes: Alert, Oriented


Labs: 


 CBC, BMP





 01/29/20 05:35 





 02/04/20 05:46 











Problem List





- Problems


(1) Arrhythmia


Assessment/Plan: 


sinus tachycardia


controlled now with  CArdizem


will start Cardizem 120 mg po daily








Code(s): I49.9 - CARDIAC ARRHYTHMIA, UNSPECIFIED   





(2) COPD with acute exacerbation


Assessment/Plan: 


continue SoluCortef, Zithromax Ceftriaxone, nebs


Code(s): J44.1 - CHRONIC OBSTRUCTIVE PULMONARY DISEASE W (ACUTE) EXACERBATION   





(3) Chronic kidney failure


Assessment/Plan: 


Lasix 40 mg iv administered currenttly renal function at baseline


Code(s): N18.9 - CHRONIC KIDNEY DISEASE, UNSPECIFIED   





(4) Diabetes mellitus type 2 in obese


Assessment/Plan: 


accuchecks  AC and HS with regular coverage


Code(s): E11.69 - TYPE 2 DIABETES MELLITUS WITH OTHER SPECIFIED COMPLICATION; 

E66.9 - OBESITY, UNSPECIFIED   





(5) Pulmonary edema


Code(s): J81.1 - CHRONIC PULMONARY EDEMA   





(6) Sepsis


Assessment/Plan: 


Sepsis present upon admission, resolved 


will repeat LActic acid


Code(s): A41.9 - SEPSIS, UNSPECIFIED ORGANISM

## 2020-02-04 NOTE — PN
Progress Note, Physician


Chief Complaint: 





clinically improved


Feeling better





TELE: NSR with a short, self limited run of PSVT but overall burden is low and 

less than prior.





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 

with EF 48%





CXR some improvement noted


History of Present Illness: 





Weight is down





- Current Medication List


Current Medications: 


Active Medications





Acetaminophen (Tylenol -)  650 mg PO Q6H PRN


   PRN Reason: MODERATE PAIN (4-7)


Albuterol Sulfate (Ventolin 0.083% Nebulizer Soln -)  1 amp NEB Q4H PRN


   PRN Reason: SHORT OF BREATH/WHEEZING


   Last Admin: 02/02/20 03:40 Dose:  1 amp


Albuterol/Ipratropium (Duoneb -)  1 amp NEB RQID [facility]


   Last Admin: 02/04/20 07:15 Dose:  1 amp


Anastrozole (Arimidex -)  1 mg PO DAILY [facility]


   Last Admin: 02/03/20 11:04 Dose:  1 mg


Aspirin (Asa -)  81 mg PO DAILY [facility]


   Last Admin: 02/03/20 10:51 Dose:  81 mg


Atorvastatin Calcium (Lipitor -)  10 mg PO HS [facility]


   Last Admin: 02/03/20 21:52 Dose:  10 mg


Clotrimazole (Lotrisone Cream (Small Tube))  1 applic TP BID [facility]


   Last Admin: 02/03/20 21:52 Dose:  1 applic


Diltiazem HCl (Cardizem -)  30 mg PO Q6HPO [facility]


   Last Admin: 02/04/20 06:37 Dose:  30 mg


Furosemide (Lasix Injection -)  40 mg IVPUSH BIDLASIX [facility]


   Last Admin: 02/04/20 06:37 Dose:  40 mg


Gabapentin (Neurontin -)  300 mg PO TID [facility]


   Last Admin: 02/04/20 06:37 Dose:  300 mg


Guaifenesin (Diabetic Tussin Dm -)  10 ml PO Q4H [facility]


   Last Admin: 02/04/20 10:00 Dose:  Not Given


Ceftriaxone Sodium 1 gm/ (Dextrose)  50 mls @ 100 mls/hr IVPB DAILY [facility]


   Last Admin: 02/03/20 10:52 Dose:  100 mls/hr


Azithromycin (Zithromax 500mg Ivpb (Pre-Docked))  500 mg in 250 mls @ 250 mls/

hr IVPB DAILY [facility]


   Last Admin: 02/03/20 10:52 Dose:  250 mls/hr


Insulin Aspart (Novolog Vial Sliding Scale -)  1 vial SQ ACHS [facility]; Protocol


   Last Admin: 02/04/20 06:37 Dose:  4 units


Levothyroxine Sodium (Synthroid -)  150 mcg PO AM [facility]


   Last Admin: 02/04/20 06:37 Dose:  150 mcg


Losartan Potassium (Cozaar -)  50 mg PO DAILY [facility]


   Last Admin: 02/04/20 11:27 Dose:  Not Given


Methylprednisolone Sodium Succinate (Solu-Medrol -)  40 mg IVPUSH Q12H [facility]


   Last Admin: 02/04/20 06:37 Dose:  40 mg


Multi-Ingredient Ointment (Zinc Oxide)  1 applic TP BID [facility]


   Last Admin: 02/03/20 21:52 Dose:  1 applic


Potassium Chloride (Potassium Chloride Oral Liquid)  40 meq PO DAILY [facility]


   Last Admin: 02/03/20 10:52 Dose:  40 meq











- Objective


Vital Signs: 


 Vital Signs











Temperature  97.8 F   02/04/20 08:05


 


Pulse Rate  85   02/04/20 08:05


 


Respiratory Rate  19   02/04/20 08:05


 


Blood Pressure  156/69   02/04/20 08:05


 


O2 Sat by Pulse Oximetry (%)  94 L  02/03/20 21:00











Constitutional: Yes: No Distress


Cardiovascular: Yes: Regular Rate and Rhythm


Respiratory: Yes: CTA Bilaterally, Other (slight decreased basilar breath sounds

)


Gastrointestinal: Yes: Soft, Abdomen, Obese


Edema: Yes


Edema: LLE: 2+, RLE: 2+


Neurological: Yes: Alert, Oriented


Labs: 


 CBC, BMP





 01/29/20 05:35 





 02/04/20 05:46 





 Laboratory Tests











  01/29/20 02/04/20





  05:35 05:46


 


WBC  12.5 H 


 


Hgb  10.4 L 


 


Plt Count  259 


 


Sodium   140


 


Potassium   4.5


 


BUN   65.0 H


 


Creatinine   1.7 H














- ....Imaging


Chest X-ray: Report Reviewed, Image Reviewed


EKG: Image Reviewed





Assessment/Plan





Assessment/Plan





ECG x2: probable sinus with frequent APCs. RBBB, LAFB. + long QT. no path q's. 

nonsp ST-Ts--vs priors: the APCs are new, QT previously prolonged





Echo 1/20: borderline EF 50%, mild LW hypo. RV normal. mild-mod MR. RVSP 30-40

















fever:


-CXR clear, UA unremarkable. BCX pending


-plan per ID





acute diast CHF: improving. 


-some edema persists


-CXR improved but not cleared. Continue IV Lasix today, try to decrease to 

daily dosing 2/5 and then PO 2/6


-echo preserved EF, valve fxn unremarkable, mild pulm HTN


-lateral hypokinesis noted--rec pharm MPI showed small apical scar, no 

ischemia. Would treat medically. Cont ASA/Statin. 





PSVT:


-brief runs on tele


-started on diltiazem for prophylaxis (avoiding b-b given active bronchospasm 

in copd pt)


-Change to Cardizem CD 2/5





prolonged QT:


-not a new finding (present on prior ECG 8/19, > 500 msec then as well).


-? effect of cilostazol (known offender with torsades risk associated) or 

Symbicort (formoterol component can prolong QT)--both meds presently being held


-K, Mag optimized


-cont lyte optimization, avoidance of QT prolonging meds





KITTY:


-? sec sepsis, vs hypovolemia--improved to baseline


-improved


-baseline 1.4-1.6





htn:


-mostly controlled


-same meds, observe trend.





copd:


-+ wheezing


-plan per pulm





periph vascular dz, venous ins'y:


-followed by wound center at [place]


-rx'd cilostazol for severe resting LE pains--sx much improved


-holding this med here sec to QT prolongation--monitor for claudication sx's





mult sclerosis:


-per primary

## 2020-02-05 LAB
ALBUMIN SERPL-MCNC: 3 G/DL (ref 3.4–5)
ALP SERPL-CCNC: 68 U/L (ref 45–117)
ALT SERPL-CCNC: 48 U/L (ref 13–61)
ANION GAP SERPL CALC-SCNC: 8 MMOL/L (ref 8–16)
ANISOCYTOSIS BLD QL: 0
AST SERPL-CCNC: 27 U/L (ref 15–37)
BASOPHILS # BLD: 0.2 % (ref 0–2)
BILIRUB SERPL-MCNC: 0.7 MG/DL (ref 0.2–1)
BUN SERPL-MCNC: 67.6 MG/DL (ref 7–18)
CALCIUM SERPL-MCNC: 9.2 MG/DL (ref 8.5–10.1)
CHLORIDE SERPL-SCNC: 93 MMOL/L (ref 98–107)
CO2 SERPL-SCNC: 39 MMOL/L (ref 21–32)
CREAT SERPL-MCNC: 1.8 MG/DL (ref 0.55–1.3)
DEPRECATED RDW RBC AUTO: 16.1 % (ref 11.6–15.6)
EOSINOPHIL # BLD: 0 % (ref 0–4.5)
GLUCOSE SERPL-MCNC: 227 MG/DL (ref 74–106)
HCT VFR BLD CALC: 37.8 % (ref 32.4–45.2)
HGB BLD-MCNC: 12.4 GM/DL (ref 10.7–15.3)
LYMPHOCYTES # BLD: 2.5 % (ref 8–40)
MACROCYTES BLD QL: 0
MAGNESIUM SERPL-MCNC: 2.2 MG/DL (ref 1.8–2.4)
MCH RBC QN AUTO: 29.6 PG (ref 25.7–33.7)
MCHC RBC AUTO-ENTMCNC: 32.7 G/DL (ref 32–36)
MCV RBC: 90.2 FL (ref 80–96)
MONOCYTES # BLD AUTO: 1.9 % (ref 3.8–10.2)
NEUTROPHILS # BLD: 95.4 % (ref 42.8–82.8)
PLATELET # BLD AUTO: 198 K/MM3 (ref 134–434)
PLATELET BLD QL SMEAR: NORMAL
PMV BLD: 10 FL (ref 7.5–11.1)
POTASSIUM SERPLBLD-SCNC: 4.1 MMOL/L (ref 3.5–5.1)
PROT SERPL-MCNC: 6.5 G/DL (ref 6.4–8.2)
RBC # BLD AUTO: 4.18 M/MM3 (ref 3.6–5.2)
SODIUM SERPL-SCNC: 140 MMOL/L (ref 136–145)
WBC # BLD AUTO: 13.9 K/MM3 (ref 4–10)

## 2020-02-05 RX ADMIN — CLOTRIMAZOLE AND BETAMETHASONE DIPROPIONATE SCH APPLIC: 10; .5 CREAM TOPICAL at 22:31

## 2020-02-05 RX ADMIN — GABAPENTIN SCH MG: 300 CAPSULE ORAL at 22:30

## 2020-02-05 RX ADMIN — INSULIN ASPART SCH UNITS: 100 INJECTION, SOLUTION INTRAVENOUS; SUBCUTANEOUS at 12:18

## 2020-02-05 RX ADMIN — GUAIFENESIN AND DEXTROMETHORPHAN HYDROBROMIDE SCH ML: 100; 10 SOLUTION ORAL at 06:55

## 2020-02-05 RX ADMIN — POTASSIUM CHLORIDE SCH MEQ: 20 SOLUTION ORAL at 09:37

## 2020-02-05 RX ADMIN — INSULIN ASPART SCH UNITS: 100 INJECTION, SOLUTION INTRAVENOUS; SUBCUTANEOUS at 23:00

## 2020-02-05 RX ADMIN — INSULIN ASPART SCH UNITS: 100 INJECTION, SOLUTION INTRAVENOUS; SUBCUTANEOUS at 17:29

## 2020-02-05 RX ADMIN — GUAIFENESIN AND DEXTROMETHORPHAN HYDROBROMIDE SCH ML: 100; 10 SOLUTION ORAL at 17:30

## 2020-02-05 RX ADMIN — GABAPENTIN SCH MG: 300 CAPSULE ORAL at 13:50

## 2020-02-05 RX ADMIN — METHYLPREDNISOLONE SODIUM SUCCINATE SCH MG: 40 INJECTION, POWDER, FOR SOLUTION INTRAMUSCULAR; INTRAVENOUS at 06:56

## 2020-02-05 RX ADMIN — ASPIRIN 81 MG SCH MG: 81 TABLET ORAL at 09:37

## 2020-02-05 RX ADMIN — METHYLPREDNISOLONE SODIUM SUCCINATE SCH MG: 40 INJECTION, POWDER, FOR SOLUTION INTRAMUSCULAR; INTRAVENOUS at 22:30

## 2020-02-05 RX ADMIN — FUROSEMIDE SCH MG: 10 INJECTION, SOLUTION INTRAVENOUS at 05:52

## 2020-02-05 RX ADMIN — LOSARTAN POTASSIUM SCH MG: 50 TABLET, FILM COATED ORAL at 09:39

## 2020-02-05 RX ADMIN — GABAPENTIN SCH MG: 300 CAPSULE ORAL at 05:52

## 2020-02-05 RX ADMIN — METHYLPREDNISOLONE SODIUM SUCCINATE SCH MG: 40 INJECTION, POWDER, FOR SOLUTION INTRAMUSCULAR; INTRAVENOUS at 17:51

## 2020-02-05 RX ADMIN — GUAIFENESIN AND DEXTROMETHORPHAN HYDROBROMIDE SCH ML: 100; 10 SOLUTION ORAL at 22:30

## 2020-02-05 RX ADMIN — CEFTRIAXONE SCH MLS/HR: 1 INJECTION, POWDER, FOR SOLUTION INTRAMUSCULAR; INTRAVENOUS at 09:37

## 2020-02-05 RX ADMIN — ZINC OXIDE SCH APPLIC: 200 OINTMENT TOPICAL at 22:31

## 2020-02-05 RX ADMIN — INSULIN ASPART SCH UNITS: 100 INJECTION, SOLUTION INTRAVENOUS; SUBCUTANEOUS at 06:55

## 2020-02-05 RX ADMIN — IPRATROPIUM BROMIDE AND ALBUTEROL SULFATE PRN AMP: .5; 3 SOLUTION RESPIRATORY (INHALATION) at 20:11

## 2020-02-05 RX ADMIN — GUAIFENESIN AND DEXTROMETHORPHAN HYDROBROMIDE SCH ML: 100; 10 SOLUTION ORAL at 13:50

## 2020-02-05 RX ADMIN — CLOTRIMAZOLE AND BETAMETHASONE DIPROPIONATE SCH APPLIC: 10; .5 CREAM TOPICAL at 09:38

## 2020-02-05 RX ADMIN — GUAIFENESIN AND DEXTROMETHORPHAN HYDROBROMIDE SCH: 100; 10 SOLUTION ORAL at 04:43

## 2020-02-05 RX ADMIN — ANASTROZOLE SCH MG: 1 TABLET, COATED ORAL at 09:41

## 2020-02-05 RX ADMIN — ZINC OXIDE SCH APPLIC: 200 OINTMENT TOPICAL at 09:38

## 2020-02-05 RX ADMIN — LEVOTHYROXINE SODIUM SCH MCG: 150 TABLET ORAL at 06:56

## 2020-02-05 RX ADMIN — ATORVASTATIN CALCIUM SCH MG: 10 TABLET, FILM COATED ORAL at 22:30

## 2020-02-05 RX ADMIN — AZITHROMYCIN DIHYDRATE SCH MLS/HR: 500 INJECTION, POWDER, LYOPHILIZED, FOR SOLUTION INTRAVENOUS at 09:38

## 2020-02-05 RX ADMIN — GUAIFENESIN AND DEXTROMETHORPHAN HYDROBROMIDE SCH ML: 100; 10 SOLUTION ORAL at 09:36

## 2020-02-05 NOTE — PN
Progress Note (short form)





- Note


Progress Note: 


Resting in NAD on NC O2.  


Cough, CONTEH, and wheezing are slowly improving.  


No acute events overnight. 





 Intake & Output











 02/02/20 02/03/20 02/04/20 02/05/20





 23:59 23:59 23:59 23:59


 


Intake Total 675 250 330 


 


Output Total 1500 1200 1800 500


 


Balance -825 -950 -1470 -500


 


Weight    225 lb 12.8 oz








 Last Vital Signs











Temp Pulse Resp BP Pulse Ox


 


 97.6 F   90   20   153/75   93 L


 


 02/05/20 05:00  02/05/20 05:00  02/05/20 05:00  02/05/20 05:00  02/04/20 21:00








Active Medications





Acetaminophen (Tylenol -)  650 mg PO Q6H PRN


   PRN Reason: MODERATE PAIN (4-7)


Anastrozole (Arimidex -)  1 mg PO DAILY [facility]


   Last Admin: 02/04/20 13:58 Dose:  1 mg


Aspirin (Asa -)  81 mg PO DAILY [facility]


   Last Admin: 02/04/20 13:58 Dose:  81 mg


Atorvastatin Calcium (Lipitor -)  10 mg PO HS [facility]


   Last Admin: 02/04/20 21:55 Dose:  10 mg


Clotrimazole (Lotrisone Cream (Small Tube))  1 applic TP BID [facility]


   Last Admin: 02/04/20 21:55 Dose:  1 applic


Diltiazem HCl (Cardizem Cd -)  120 mg PO DAILY [facility]


Furosemide (Lasix Injection -)  40 mg IVPUSH BIDLASIX [facility]


   Last Admin: 02/05/20 05:52 Dose:  40 mg


Gabapentin (Neurontin -)  300 mg PO TID [facility]


   Last Admin: 02/05/20 05:52 Dose:  300 mg


Guaifenesin (Diabetic Tussin Dm -)  10 ml PO Q4H [facility]


   Last Admin: 02/05/20 06:55 Dose:  10 ml


Ceftriaxone Sodium 1 gm/ (Dextrose)  50 mls @ 100 mls/hr IVPB DAILY [facility]


   Last Admin: 02/04/20 13:57 Dose:  100 mls/hr


Azithromycin (Zithromax 500mg Ivpb (Pre-Docked))  500 mg in 250 mls @ 250 mls/

hr IVPB DAILY [facility]


   Last Admin: 02/04/20 13:57 Dose:  250 mls/hr


Insulin Aspart (Novolog Vial Sliding Scale -)  1 vial SQ ACHS [facility]; Protocol


   Last Admin: 02/05/20 06:55 Dose:  4 units


Levothyroxine Sodium (Synthroid -)  150 mcg PO AM [facility]


   Last Admin: 02/05/20 06:56 Dose:  150 mcg


Losartan Potassium (Cozaar -)  50 mg PO DAILY [facility]


   Last Admin: 02/04/20 11:27 Dose:  Not Given


Methylprednisolone Sodium Succinate (Solu-Medrol -)  40 mg IVPUSH Q12H [facility]


   Last Admin: 02/05/20 06:56 Dose:  40 mg


Multi-Ingredient Ointment (Zinc Oxide)  1 applic TP BID [facility]


   Last Admin: 02/04/20 21:55 Dose:  1 applic


Potassium Chloride (Potassium Chloride Oral Liquid)  40 meq PO DAILY [facility]


   Last Admin: 02/04/20 13:57 Dose:  40 meq











Gen:  NAD 


Heart: RRR


Lung: Bibasilar rhonchi/rales, no wheezes


Abd: soft, nontender


Ext: + edema





 Laboratory Results - last 24 hr











  02/04/20 02/04/20 02/04/20





  14:10 17:17 22:01


 


WBC   


 


RBC   


 


Hgb   


 


Hct   


 


MCV   


 


MCH   


 


MCHC   


 


RDW   


 


Plt Count   


 


MPV   


 


Absolute Neuts (auto)   


 


Neutrophils %   


 


Lymphocytes %   


 


Monocytes %   


 


Eosinophils %   


 


Basophils %   


 


Nucleated RBC %   


 


Sodium   


 


Potassium   


 


Chloride   


 


Carbon Dioxide   


 


Anion Gap   


 


BUN   


 


Creatinine   


 


Est GFR (CKD-EPI)AfAm   


 


Est GFR (CKD-EPI)NonAf   


 


POC Glucometer  215  273  219


 


Random Glucose   


 


Calcium   


 


Magnesium   


 


Total Bilirubin   


 


AST   


 


ALT   


 


Alkaline Phosphatase   


 


Total Protein   


 


Albumin   














  02/05/20 02/05/20 02/05/20





  05:43 07:30 07:30


 


WBC    13.9 H


 


RBC    4.18


 


Hgb    12.4


 


Hct    37.8  D


 


MCV    90.2


 


MCH    29.6


 


MCHC    32.7


 


RDW    16.1 H


 


Plt Count    198  D


 


MPV    10.0


 


Absolute Neuts (auto)    13.2 H


 


Neutrophils %    95.4 H


 


Lymphocytes %    2.5 L D


 


Monocytes %    1.9 L


 


Eosinophils %    0.0  D


 


Basophils %    0.2  D


 


Nucleated RBC %    0


 


Sodium   140 


 


Potassium   4.1 


 


Chloride   93 L 


 


Carbon Dioxide   39 H 


 


Anion Gap   8 


 


BUN   67.6 H 


 


Creatinine   1.8 H 


 


Est GFR (CKD-EPI)AfAm   28.42 


 


Est GFR (CKD-EPI)NonAf   24.52 


 


POC Glucometer  227  


 


Random Glucose   227 H 


 


Calcium   9.2 


 


Magnesium   2.2 


 


Total Bilirubin   0.7 


 


AST   27 


 


ALT   48 


 


Alkaline Phosphatase   68 


 


Total Protein   6.5 


 


Albumin   3.0 L 











Problem List





- Problems


(1) COPD with acute exacerbation


Code(s): J44.1 - CHRONIC OBSTRUCTIVE PULMONARY DISEASE W (ACUTE) EXACERBATION   








A/P


URI


Acute COPD Exacerbation


Sepsis


Lactic Acidosis


Acute on Chronic Renal Failure


HTN


Hyperlipidemia


Anemia





-  Lasix 


-  ABX per ID


-  Can change to Prednisone  


-  inhaled bronchodilators


-  O2 to keep Spo2 >90%


-  monitor urine output, creatinine


-  DVT prophylaxis





Dr Fritz

## 2020-02-05 NOTE — PN
Progress Note (short form)





- Note


Progress Note: 


s: sob improved, no chest pain, palps, dizziness





 Current Medications





Acetaminophen (Tylenol -)  650 mg PO Q6H PRN


   PRN Reason: MODERATE PAIN (4-7)


Albuterol/Ipratropium (Duoneb -)  1 amp NEB Q6H PRN


   PRN Reason: SHORTNESS OF BREATH


Anastrozole (Arimidex -)  1 mg PO DAILY [facility]


   Last Admin: 02/05/20 09:41 Dose:  1 mg


Aspirin (Asa -)  81 mg PO DAILY [facility]


   Last Admin: 02/05/20 09:37 Dose:  81 mg


Atorvastatin Calcium (Lipitor -)  10 mg PO HS [facility]


   Last Admin: 02/04/20 21:55 Dose:  10 mg


Clotrimazole (Lotrisone Cream (Small Tube))  1 applic TP BID [facility]


   Last Admin: 02/05/20 09:38 Dose:  1 applic


Diltiazem HCl (Cardizem Cd -)  120 mg PO DAILY [facility]


   Last Admin: 02/05/20 09:37 Dose:  120 mg


Furosemide (Lasix -)  40 mg PO DAILY [facility]


Gabapentin (Neurontin -)  300 mg PO TID [facility]


   Last Admin: 02/05/20 05:52 Dose:  300 mg


Guaifenesin (Diabetic Tussin Dm -)  10 ml PO Q4H [facility]


   Last Admin: 02/05/20 09:36 Dose:  10 ml


Ceftriaxone Sodium 1 gm/ (Dextrose)  50 mls @ 100 mls/hr IVPB DAILY [facility]


   Last Admin: 02/05/20 09:37 Dose:  100 mls/hr


Azithromycin (Zithromax 500mg Ivpb (Pre-Docked))  500 mg in 250 mls @ 250 mls/

hr IVPB DAILY [facility]


   Last Admin: 02/05/20 09:38 Dose:  250 mls/hr


Insulin Aspart (Novolog Vial Sliding Scale -)  1 vial SQ ACHS [facility]; Protocol


   Last Admin: 02/05/20 06:55 Dose:  4 units


Levothyroxine Sodium (Synthroid -)  150 mcg PO AM [facility]


   Last Admin: 02/05/20 06:56 Dose:  150 mcg


Losartan Potassium (Cozaar -)  50 mg PO DAILY [facility]


   Last Admin: 02/05/20 09:39 Dose:  50 mg


Methylprednisolone Sodium Succinate (Solu-Medrol -)  40 mg IVPUSH Q12H [facility]


   Stop: 02/06/20 07:00


   Last Admin: 02/05/20 06:56 Dose:  40 mg


Multi-Ingredient Ointment (Zinc Oxide)  1 applic TP BID [facility]


   Last Admin: 02/05/20 09:38 Dose:  1 applic


Potassium Chloride (Potassium Chloride Oral Liquid)  40 meq PO DAILY [facility]


   Last Admin: 02/05/20 09:37 Dose:  40 meq


Prednisone (Deltasone -)  40 mg PO DAILY [facility]





 Vital Signs











 Period  Temp  Pulse  Resp  BP Sys/Spence  Pulse Ox


 


 Last 24 Hr  97.6 F-98.2 F    20-20  132-153/60-75  93-93














Constitutional: Yes: No Distress


Cardiovascular: Yes: Regular Rate and Rhythm


Respiratory: Yes: CTA Bilaterally


Gastrointestinal: Yes: Soft, Abdomen, Obese


Edema: Yes


Edema: trace bilaterally


Neurological: Yes: Alert, Oriented





Assessment/Plan





ECG x2: probable sinus with frequent APCs. RBBB, LAFB. + long QT. no path q's. 

nonsp ST-Ts--vs priors: the APCs are new, QT previously prolonged





Echo 1/20: borderline EF 50%, mild LW hypo. RV normal. mild-mod MR. RVSP 30-40





tele: sinus, sinus tach








fever:


-plan per ID, improving





acute diast CHF


- diuresed with IV lasix, dyspnea and edema improving


- transition to lasix 40 mg PO daily


-echo preserved EF, valve fxn unremarkable, mild pulm HTN


- pharm MPI showed small apical scar, no ischemia. Would treat medically. Cont 

ASA/Statin. 





PSVT:


-brief runs on tele


-started on diltiazem for prophylaxis (avoiding b-b given active bronchospasm 

in copd pt)


- cont cardizem


- dc tele





prolonged QT:


-not a new finding (present on prior ECG 8/19, > 500 msec then as well).


-? effect of cilostazol (known offender with torsades risk associated) or 

Symbicort (formoterol component can prolong QT)--both meds presently being held


-K, Mag optimized


-cont lyte optimization, avoidance of QT prolonging meds





KITTY:


-? sec sepsis, vs hypovolemia--improved to baseline


-improved


-baseline 1.4-1.6





htn:


-mostly controlled


-same meds, observe trend.





copd:


-+ wheezing


-plan per pulm





periph vascular dz, venous ins'y:


-followed by wound center at [place]


-rx'd cilostazol for severe resting LE pains--sx much improved


-holding this med here sec to QT prolongation--monitor for claudication sx's





mult sclerosis:


-per primary

## 2020-02-05 NOTE — PN
Progress Note, Physician


Chief Complaint: 


patient examined, cough decreaed and patient is less dyspneic








- Current Medication List


Current Medications: 


Active Medications





Acetaminophen (Tylenol -)  650 mg PO Q6H PRN


   PRN Reason: MODERATE PAIN (4-7)


Anastrozole (Arimidex -)  1 mg PO DAILY [facility]


   Last Admin: 02/05/20 09:41 Dose:  1 mg


Aspirin (Asa -)  81 mg PO DAILY [facility]


   Last Admin: 02/05/20 09:37 Dose:  81 mg


Atorvastatin Calcium (Lipitor -)  10 mg PO HS [facility]


   Last Admin: 02/04/20 21:55 Dose:  10 mg


Clotrimazole (Lotrisone Cream (Small Tube))  1 applic TP BID [facility]


   Last Admin: 02/05/20 09:38 Dose:  1 applic


Diltiazem HCl (Cardizem Cd -)  120 mg PO DAILY [facility]


   Last Admin: 02/05/20 09:37 Dose:  120 mg


Furosemide (Lasix Injection -)  40 mg IVPUSH BIDLASIX [facility]


   Last Admin: 02/05/20 05:52 Dose:  40 mg


Gabapentin (Neurontin -)  300 mg PO TID [facility]


   Last Admin: 02/05/20 05:52 Dose:  300 mg


Guaifenesin (Diabetic Tussin Dm -)  10 ml PO Q4H [facility]


   Last Admin: 02/05/20 09:36 Dose:  10 ml


Ceftriaxone Sodium 1 gm/ (Dextrose)  50 mls @ 100 mls/hr IVPB DAILY [facility]


   Last Admin: 02/05/20 09:37 Dose:  100 mls/hr


Azithromycin (Zithromax 500mg Ivpb (Pre-Docked))  500 mg in 250 mls @ 250 mls/

hr IVPB DAILY [facility]


   Last Admin: 02/05/20 09:38 Dose:  250 mls/hr


Insulin Aspart (Novolog Vial Sliding Scale -)  1 vial SQ ACHS [facility]; Protocol


   Last Admin: 02/05/20 06:55 Dose:  4 units


Levothyroxine Sodium (Synthroid -)  150 mcg PO AM [facility]


   Last Admin: 02/05/20 06:56 Dose:  150 mcg


Losartan Potassium (Cozaar -)  50 mg PO DAILY [facility]


   Last Admin: 02/05/20 09:39 Dose:  50 mg


Methylprednisolone Sodium Succinate (Solu-Medrol -)  40 mg IVPUSH Q12H [facility]


   Stop: 02/06/20 07:00


   Last Admin: 02/05/20 06:56 Dose:  40 mg


Methylprednisolone Sodium Succinate (Solu-Medrol -)  40 mg IVPUSH DAILY [facility]


Multi-Ingredient Ointment (Zinc Oxide)  1 applic TP BID [facility]


   Last Admin: 02/05/20 09:38 Dose:  1 applic


Potassium Chloride (Potassium Chloride Oral Liquid)  40 meq PO DAILY [facility]


   Last Admin: 02/05/20 09:37 Dose:  40 meq











- Objective


Vital Signs: 


 Vital Signs











Temperature  98.0 F   02/05/20 09:00


 


Pulse Rate  89   02/05/20 09:00


 


Respiratory Rate  20 02/05/20 09:00


 


Blood Pressure  136/62   02/05/20 09:00


 


O2 Sat by Pulse Oximetry (%)  93 L  02/05/20 09:00











Constitutional: Yes: No Distress, Calm


Neck: Yes: Supple, Trachea Midline


Cardiovascular: Yes: Regular Rate and Rhythm, S1, S2


Respiratory: Yes: Regular, CTA Bilaterally, SOB.  No: Rales, Rhonchi, Wheezes


Gastrointestinal: Yes: Normal Bowel Sounds, Soft, Abdomen, Obese


Extremities: No: Calf Tenderness


Neurological: Yes: Alert, Oriented


Psychiatric: Yes: Alert, Oriented


Labs: 


 CBC, BMP





 02/05/20 07:30 





 02/05/20 07:30 











Problem List





- Problems


(1) Arrhythmia


Assessment/Plan: 


sinus tachycardia


orAL dILTIAZEM








Code(s): I49.9 - CARDIAC ARRHYTHMIA, UNSPECIFIED   





(2) COPD with acute exacerbation


Assessment/Plan: 


continue pREDNISONE, Zithromax and Ceftriaxone


Code(s): J44.1 - CHRONIC OBSTRUCTIVE PULMONARY DISEASE W (ACUTE) EXACERBATION   





(3) Chronic kidney failure


Assessment/Plan: 


better, restarted lASIX PO


Code(s): N18.9 - CHRONIC KIDNEY DISEASE, UNSPECIFIED   





(4) Diabetes mellitus type 2 in obese


Assessment/Plan: 


accuchecks  AC and HS with regular coverage


Code(s): E11.69 - TYPE 2 DIABETES MELLITUS WITH OTHER SPECIFIED COMPLICATION; 

E66.9 - OBESITY, UNSPECIFIED   





(5) Pulmonary edema


Assessment/Plan: 


REPEAT CXR 








Code(s): J81.1 - CHRONIC PULMONARY EDEMA   





(6) Sepsis


Assessment/Plan: 


Sepsis present upon admission


fever responded to antibiotics 





Code(s): A41.9 - SEPSIS, UNSPECIFIED ORGANISM

## 2020-02-06 LAB
ANION GAP SERPL CALC-SCNC: 9 MMOL/L (ref 8–16)
BUN SERPL-MCNC: 71.4 MG/DL (ref 7–18)
CALCIUM SERPL-MCNC: 8.4 MG/DL (ref 8.5–10.1)
CHLORIDE SERPL-SCNC: 95 MMOL/L (ref 98–107)
CO2 SERPL-SCNC: 32 MMOL/L (ref 21–32)
CREAT SERPL-MCNC: 1.8 MG/DL (ref 0.55–1.3)
GLUCOSE SERPL-MCNC: 418 MG/DL (ref 74–106)
POTASSIUM SERPLBLD-SCNC: 4.7 MMOL/L (ref 3.5–5.1)
SODIUM SERPL-SCNC: 136 MMOL/L (ref 136–145)

## 2020-02-06 RX ADMIN — CLOTRIMAZOLE AND BETAMETHASONE DIPROPIONATE SCH APPLIC: 10; .5 CREAM TOPICAL at 22:38

## 2020-02-06 RX ADMIN — POTASSIUM CHLORIDE SCH MEQ: 20 SOLUTION ORAL at 09:12

## 2020-02-06 RX ADMIN — ATORVASTATIN CALCIUM SCH MG: 10 TABLET, FILM COATED ORAL at 22:38

## 2020-02-06 RX ADMIN — IPRATROPIUM BROMIDE AND ALBUTEROL SULFATE PRN AMP: .5; 3 SOLUTION RESPIRATORY (INHALATION) at 20:48

## 2020-02-06 RX ADMIN — GUAIFENESIN AND DEXTROMETHORPHAN HYDROBROMIDE SCH: 100; 10 SOLUTION ORAL at 13:13

## 2020-02-06 RX ADMIN — INSULIN ASPART SCH UNITS: 100 INJECTION, SOLUTION INTRAVENOUS; SUBCUTANEOUS at 22:38

## 2020-02-06 RX ADMIN — FUROSEMIDE SCH MG: 40 TABLET ORAL at 09:11

## 2020-02-06 RX ADMIN — ZINC OXIDE SCH APPLIC: 200 OINTMENT TOPICAL at 09:58

## 2020-02-06 RX ADMIN — ASPIRIN 81 MG SCH MG: 81 TABLET ORAL at 09:11

## 2020-02-06 RX ADMIN — AZITHROMYCIN DIHYDRATE SCH MLS/HR: 500 INJECTION, POWDER, LYOPHILIZED, FOR SOLUTION INTRAVENOUS at 09:58

## 2020-02-06 RX ADMIN — LEVOTHYROXINE SODIUM SCH MCG: 150 TABLET ORAL at 06:13

## 2020-02-06 RX ADMIN — ANASTROZOLE SCH MG: 1 TABLET, COATED ORAL at 09:12

## 2020-02-06 RX ADMIN — CEFTRIAXONE SCH MLS/HR: 1 INJECTION, POWDER, FOR SOLUTION INTRAMUSCULAR; INTRAVENOUS at 09:13

## 2020-02-06 RX ADMIN — GUAIFENESIN AND DEXTROMETHORPHAN HYDROBROMIDE SCH: 100; 10 SOLUTION ORAL at 06:12

## 2020-02-06 RX ADMIN — INSULIN ASPART SCH UNITS: 100 INJECTION, SOLUTION INTRAVENOUS; SUBCUTANEOUS at 12:49

## 2020-02-06 RX ADMIN — LOSARTAN POTASSIUM SCH MG: 50 TABLET, FILM COATED ORAL at 09:11

## 2020-02-06 RX ADMIN — GUAIFENESIN AND DEXTROMETHORPHAN HYDROBROMIDE SCH ML: 100; 10 SOLUTION ORAL at 17:38

## 2020-02-06 RX ADMIN — IPRATROPIUM BROMIDE AND ALBUTEROL SULFATE PRN AMP: .5; 3 SOLUTION RESPIRATORY (INHALATION) at 11:55

## 2020-02-06 RX ADMIN — INSULIN ASPART SCH UNITS: 100 INJECTION, SOLUTION INTRAVENOUS; SUBCUTANEOUS at 16:41

## 2020-02-06 RX ADMIN — METHYLPREDNISOLONE SODIUM SUCCINATE SCH MG: 40 INJECTION, POWDER, FOR SOLUTION INTRAMUSCULAR; INTRAVENOUS at 06:12

## 2020-02-06 RX ADMIN — ZINC OXIDE SCH APPLIC: 200 OINTMENT TOPICAL at 22:39

## 2020-02-06 RX ADMIN — GABAPENTIN SCH MG: 300 CAPSULE ORAL at 22:38

## 2020-02-06 RX ADMIN — CLOTRIMAZOLE AND BETAMETHASONE DIPROPIONATE SCH APPLIC: 10; .5 CREAM TOPICAL at 09:59

## 2020-02-06 RX ADMIN — INSULIN ASPART SCH UNITS: 100 INJECTION, SOLUTION INTRAVENOUS; SUBCUTANEOUS at 06:12

## 2020-02-06 RX ADMIN — GUAIFENESIN AND DEXTROMETHORPHAN HYDROBROMIDE SCH ML: 100; 10 SOLUTION ORAL at 22:38

## 2020-02-06 RX ADMIN — GABAPENTIN SCH MG: 300 CAPSULE ORAL at 06:12

## 2020-02-06 RX ADMIN — GABAPENTIN SCH MG: 300 CAPSULE ORAL at 13:13

## 2020-02-06 NOTE — PN
Progress Note (short form)





- Note


Progress Note: 





Renal follow up for KITTY





Seen and examined at the bedside


feels better


shortness of breath is improved


no chest pain, abdominal pain, fever or chills 


making urine


tolerating diet 


 





 Vital Signs











Temperature  97.4 F L  02/06/20 09:00


 


Pulse Rate  94 H  02/06/20 09:00


 


Respiratory Rate  20   02/06/20 09:00


 


Blood Pressure  123/55 L  02/06/20 09:00


 


O2 Sat by Pulse Oximetry (%)  95   02/06/20 09:00








 Intake & Output











 02/03/20 02/04/20 02/05/20 02/06/20





 23:59 23:59 23:59 23:59


 


Intake Total 250 330 710 440


 


Output Total 1200 1800 2000 1700


 


Balance -950 -1470 -1290 -1260


 


Weight   102.421 kg 102.172 kg














NAD


awake and alert


RRR, no M/R


bilateral air entry, dec BS


soft NT/ND, no guarding


trace edema 





 CBC, BMP





 02/05/20 07:30 





 Current Medications





Acetaminophen (Tylenol -)  650 mg PO Q6H PRN


   PRN Reason: MODERATE PAIN (4-7)


Albuterol/Ipratropium (Duoneb -)  1 amp NEB Q6H PRN


   PRN Reason: SHORTNESS OF BREATH


   Last Admin: 02/06/20 11:55 Dose:  1 amp


Anastrozole (Arimidex -)  1 mg PO DAILY [facility]


   Last Admin: 02/06/20 09:12 Dose:  1 mg


Aspirin (Asa -)  81 mg PO DAILY [facility]


   Last Admin: 02/06/20 09:11 Dose:  81 mg


Atorvastatin Calcium (Lipitor -)  10 mg PO HS [facility]


   Last Admin: 02/05/20 22:30 Dose:  10 mg


Clotrimazole (Lotrisone Cream (Small Tube))  1 applic TP BID [facility]


   Last Admin: 02/06/20 09:59 Dose:  1 applic


Diltiazem HCl (Cardizem Cd -)  120 mg PO DAILY [facility]


   Last Admin: 02/06/20 09:11 Dose:  120 mg


Furosemide (Lasix -)  40 mg PO DAILY [facility]


   Last Admin: 02/06/20 09:11 Dose:  40 mg


Gabapentin (Neurontin -)  300 mg PO TID [facility]


   Last Admin: 02/06/20 13:13 Dose:  300 mg


Guaifenesin (Diabetic Tussin Dm -)  10 ml PO Q4H [facility]


   Last Admin: 02/06/20 13:13 Dose:  Not Given


Ceftriaxone Sodium 1 gm/ (Dextrose)  50 mls @ 100 mls/hr IVPB DAILY [facility]


   Last Admin: 02/06/20 09:13 Dose:  100 mls/hr


Azithromycin (Zithromax 500mg Ivpb (Pre-Docked))  500 mg in 250 mls @ 250 mls/

hr IVPB DAILY [facility]


   Last Admin: 02/06/20 09:58 Dose:  250 mls/hr


Insulin Aspart (Novolog Vial Sliding Scale -)  1 vial SQ ACHS [facility]; Protocol


   Last Admin: 02/06/20 12:49 Dose:  8 units


Levothyroxine Sodium (Synthroid -)  150 mcg PO AM [facility]


   Last Admin: 02/06/20 06:13 Dose:  150 mcg


Losartan Potassium (Cozaar -)  50 mg PO DAILY [facility]


   Last Admin: 02/06/20 09:11 Dose:  50 mg


Multi-Ingredient Ointment (Zinc Oxide)  1 applic TP BID [facility]


   Last Admin: 02/06/20 09:58 Dose:  1 applic


Potassium Chloride (Potassium Chloride Oral Liquid)  40 meq PO DAILY [facility]


   Last Admin: 02/06/20 09:12 Dose:  40 meq


Prednisone (Deltasone -)  40 mg PO DAILY [facility]


   Last Admin: 02/06/20 09:11 Dose:  40 mg














89 year old  woman with history of CKD, hypertension, hyperlipidemia, 

COPD who presented from home with cough and fever x 2 days duration and 

admitted to r/o influenza/URI with KITTY with Cr of 2.3.





1. Acute on chronic renal insufficiency likely due to intravascular volume 

depletion vs. AIN vs. GN (hematuria, porteinuria)


2. CKD stage 3a


3. Multiple bilateral renal cysts


4. Fever


5. COPD


6. Anemia 


7. Lactic acidosis 





Noted Cr rise from 1.5 to 1.7, likely related to IV diuretics and ARB


no electrolyte or acid/base disturbance noted 


Continue Lasix 40mg daily and Losartan 50mg daily


Ordered labs for today to ensure they are stable. 


Lactic acidosis noted, no significant anion gap so likely lactic acidosis 

related to Nebs/Albuterol


Will repeat LA today 


if renal function stable can consider discharge planning


will follow up as needed


our office contact information provided to establish outpatient follow up





Kwame Mendenhall DO

## 2020-02-06 NOTE — PN
Progress Note (short form)





- Note


Progress Note: 


s: sob improved, no chest pain, palps, dizziness





  Current Medications











Generic Name Dose Route Start Last Admin





  Trade Name Freq  PRN Reason Stop Dose Admin


 


Acetaminophen  650 mg  01/26/20 18:14  





  Tylenol -  PO   





  Q6H PRN   





  MODERATE PAIN (4-7)   





     





     





     


 


Albuterol/Ipratropium  1 amp  02/05/20 12:05  02/05/20 20:11





  Duoneb -  NEB   1 amp





  Q6H PRN   Administration





  SHORTNESS OF BREATH   





     





     





     


 


Anastrozole  1 mg  01/27/20 10:00  02/06/20 09:12





  Arimidex -  PO   1 mg





  DAILY L[facility]ZARO   Administration





     





     





     





     


 


Aspirin  81 mg  01/26/20 18:15  02/06/20 09:11





  Asa -  PO   81 mg





  DAILY L[facility]ZARO   Administration





     





     





     





     


 


Atorvastatin Calcium  10 mg  01/26/20 22:00  02/05/20 22:30





  Lipitor -  PO   10 mg





  HS [facility]O   Administration





     





     





     





     


 


Clotrimazole  1 applic  01/29/20 22:00  02/06/20 09:59





  Lotrisone Cream (Small Tube)  TP   1 applic





  BID [facility]   Administration





     





     





     





     


 


Diltiazem HCl  120 mg  02/05/20 10:00  02/06/20 09:11





  Cardizem Cd -  PO   120 mg





  DAILY L[facility]ZARO   Administration





     





     





     





     


 


Furosemide  40 mg  02/06/20 10:00  02/06/20 09:11





  Lasix -  PO   40 mg





  DAILY L[facility]ZARO   Administration





     





     





     





     


 


Gabapentin  300 mg  01/26/20 22:00  02/06/20 06:12





  Neurontin -  PO   300 mg





  TID [facility]   Administration





     





     





     





     


 


Guaifenesin  10 ml  01/26/20 21:00  02/06/20 06:12





  Diabetic Tussin Dm -  PO   Not Given





  Q4H L[facility]O   





     





     





     





     


 


Ceftriaxone Sodium 1 gm/  50 mls @ 100 mls/hr  01/27/20 12:00  02/06/20 09:13





  Dextrose  IVPB   100 mls/hr





  DAILY L[facility]ZARO   Administration





     





     





     





     


 


Azithromycin  500 mg in 250 mls @ 250 mls/hr  01/27/20 12:00  02/06/20 09:58





  Zithromax 500mg Ivpb (Pre-Docked)  IVPB   250 mls/hr





  DAILY L[facility]ZARO   Administration





     





     





     





     


 


Insulin Aspart  1 vial  02/01/20 22:00  02/06/20 06:12





  Novolog Vial Sliding Scale -  SQ   4 units





  [facility]   Administration





     





     





  Protocol   





     


 


Levothyroxine Sodium  150 mcg  01/27/20 07:00  02/06/20 06:13





  Synthroid -  PO   150 mcg





  AM L[facility]ZARO   Administration





     





     





     





     


 


Losartan Potassium  50 mg  01/29/20 10:00  02/06/20 09:11





  Cozaar -  PO   50 mg





  DAILY L[facility]O   Administration





     





     





     





     


 


Multi-Ingredient Ointment  1 applic  01/26/20 22:00  02/06/20 09:58





  Zinc Oxide  TP   1 applic





  BID [facility]   Administration





     





     





     





     


 


Potassium Chloride  40 meq  02/02/20 10:30  02/06/20 09:12





  Potassium Chloride Oral Liquid  PO   40 meq





  DAILY [facility]   Administration





     





     





     





     


 


Prednisone  40 mg  02/06/20 10:00  02/06/20 09:11





  Deltasone -  PO   40 mg





  DAILY L[facility]   Administration





     





     





     





     








 Vital Signs











 Period  Temp  Pulse  Resp  BP Sys/Spence  Pulse Ox


 


 Last 24 Hr  97.4 F-98.2 F    20-20  119-146/50-86  95-95











Constitutional: Yes: No Distress


Cardiovascular: Yes: Regular Rate and Rhythm


Respiratory: Yes: CTA Bilaterally nl eff


no jaundiec diaphoresis


Gastrointestinal: Yes: Soft, Abdomen, Obese


Edema: Yes


Edema: trace bilaterally


Neurological: Yes: Alert, Oriented


 CBC, BMP





 02/05/20 07:30 





 02/05/20 07:30 














Assessment/Plan





ECG x2: probable sinus with frequent APCs. RBBB, LAFB. + long QT. no path q's. 

nonsp ST-Ts--vs priors: the APCs are new, QT previously prolonged





Echo 1/20: borderline EF 50%, mild LW hypo. RV normal. mild-mod MR. RVSP 30-40





tele: sinus








fever:


-plan per ID, improving





acute diast CHF


- diuresed with IV lasix, dyspnea and edema improving


- transition to lasix 40 mg PO daily


-echo preserved EF, valve fxn unremarkable, mild pulm HTN


- pharm MPI showed small apical scar, no ischemia. Would treat medically. Cont 

ASA/Statin. 





PSVT:


-brief runs on tele


-started on diltiazem for prophylaxis (avoiding b-b given active bronchospasm 

in copd pt)


- cont cardizem


- dc tele





prolonged QT:


-not a new finding (present on prior ECG 8/19, > 500 msec then as well).


-? effect of cilostazol (known offender with torsades risk associated) or 

Symbicort (formoterol component can prolong QT)--both meds presently being held


-K, Mag optimized


-cont lyte optimization, avoidance of QT prolonging meds





KITTY:


-? sec sepsis, vs hypovolemia--improved to baseline


-improved


-baseline 1.4-1.6





htn:


-mostly controlled


-same meds, observe trend.





copd:


-plan per pulm





periph vascular dz, venous ins'y:


-followed by wound center at [place]


-rx'd cilostazol for severe resting LE pains--sx much improved


-holding this med here sec to QT prolongation--monitor for claudication sx's





mult sclerosis:


-per primary

## 2020-02-06 NOTE — PN
Progress Note (short form)





- Note


Progress Note: 


Resting in NAD on NC O2.  


Cough, CONTEH, and wheezing are improving.  


No acute events overnight. 





 Intake & Output











 02/03/20 02/04/20 02/05/20 02/06/20





 23:59 23:59 23:59 23:59


 


Intake Total 250 330 710 200


 


Output Total 1200 1800 2000 900


 


Balance -950 -1470 -1290 -700


 


Weight   225 lb 12.8 oz 225 lb 4 oz








 Last Vital Signs











Temp Pulse Resp BP Pulse Ox


 


 97.4 F L  78   20   130/50 L  95 


 


 02/06/20 05:00  02/06/20 05:00  02/06/20 05:00  02/06/20 05:00  02/05/20 21:00








Active Medications





Acetaminophen (Tylenol -)  650 mg PO Q6H PRN


   PRN Reason: MODERATE PAIN (4-7)


Albuterol/Ipratropium (Duoneb -)  1 amp NEB Q6H PRN


   PRN Reason: SHORTNESS OF BREATH


   Last Admin: 02/05/20 20:11 Dose:  1 amp


Anastrozole (Arimidex -)  1 mg PO DAILY [facility]


   Last Admin: 02/06/20 09:12 Dose:  1 mg


Aspirin (Asa -)  81 mg PO DAILY [facility]


   Last Admin: 02/06/20 09:11 Dose:  81 mg


Atorvastatin Calcium (Lipitor -)  10 mg PO HS [facility]


   Last Admin: 02/05/20 22:30 Dose:  10 mg


Clotrimazole (Lotrisone Cream (Small Tube))  1 applic TP BID [facility]


   Last Admin: 02/06/20 09:59 Dose:  1 applic


Diltiazem HCl (Cardizem Cd -)  120 mg PO DAILY [facility]


   Last Admin: 02/06/20 09:11 Dose:  120 mg


Furosemide (Lasix -)  40 mg PO DAILY [facility]


   Last Admin: 02/06/20 09:11 Dose:  40 mg


Gabapentin (Neurontin -)  300 mg PO TID [facility]


   Last Admin: 02/06/20 06:12 Dose:  300 mg


Guaifenesin (Diabetic Tussin Dm -)  10 ml PO Q4H [facility]


   Last Admin: 02/06/20 06:12 Dose:  Not Given


Ceftriaxone Sodium 1 gm/ (Dextrose)  50 mls @ 100 mls/hr IVPB DAILY [facility]


   Last Admin: 02/06/20 09:13 Dose:  100 mls/hr


Azithromycin (Zithromax 500mg Ivpb (Pre-Docked))  500 mg in 250 mls @ 250 mls/

hr IVPB DAILY [facility]


   Last Admin: 02/06/20 09:58 Dose:  250 mls/hr


Insulin Aspart (Novolog Vial Sliding Scale -)  1 vial SQ ACHS [facility]; Protocol


   Last Admin: 02/06/20 06:12 Dose:  4 units


Levothyroxine Sodium (Synthroid -)  150 mcg PO AM [facility]


   Last Admin: 02/06/20 06:13 Dose:  150 mcg


Losartan Potassium (Cozaar -)  50 mg PO DAILY [facility]


   Last Admin: 02/06/20 09:11 Dose:  50 mg


Multi-Ingredient Ointment (Zinc Oxide)  1 applic TP BID [facility]


   Last Admin: 02/06/20 09:58 Dose:  1 applic


Potassium Chloride (Potassium Chloride Oral Liquid)  40 meq PO DAILY [facility]


   Last Admin: 02/06/20 09:12 Dose:  40 meq


Prednisone (Deltasone -)  40 mg PO DAILY [facility]


   Last Admin: 02/06/20 09:11 Dose:  40 mg








Gen:  NAD 


Heart: RRR


Lung: Improving bibasilar rhonchi/rales, no wheezes


Abd: soft, nontender


Ext: + edema


 Laboratory Results - last 24 hr











  02/05/20 02/05/20 02/05/20





  07:30 12:10 13:37


 


Nucleated RBC %  0  


 


POC Glucometer   259 


 


Lactic Acid    3.7 H*














  02/05/20 02/05/20 02/06/20





  16:47 22:53 05:52


 


Nucleated RBC %   


 


POC Glucometer  208  268  233


 


Lactic Acid   











Problem List





- Problems


(1) COPD with acute exacerbation


Code(s): J44.1 - CHRONIC OBSTRUCTIVE PULMONARY DISEASE W (ACUTE) EXACERBATION   








A/P


URI


Acute COPD Exacerbation


Sepsis


Lactic Acidosis


Acute on Chronic Renal Failure


HTN


Hyperlipidemia


Anemia





-  Lasix 


-  ABX per ID


-  Prednisone  


-  inhaled bronchodilators


-  O2 to keep Spo2 >90%


-  DVT prophylaxis


-  DC planning 





Dr Fritz

## 2020-02-06 NOTE — PN
Progress Note, Physician


Chief Complaint: 


patient examined, cough decreased with  less dyspnea





History of Present Illness: 





89 admitted for Sepsis with high fever, shortness of breath and cough . iv 

antibiotics were started, together with  SoluCortef . Now patient is on a taper 

dose of oral Prednisone. She is improving but wheezing is persisting. 





- Current Medication List


Current Medications: 


Active Medications





Acetaminophen (Tylenol -)  650 mg PO Q6H PRN


   PRN Reason: MODERATE PAIN (4-7)


Albuterol/Ipratropium (Duoneb -)  1 amp NEB Q6H PRN


   PRN Reason: SHORTNESS OF BREATH


   Last Admin: 02/06/20 11:55 Dose:  1 amp


Anastrozole (Arimidex -)  1 mg PO DAILY [facility]


   Last Admin: 02/06/20 09:12 Dose:  1 mg


Aspirin (Asa -)  81 mg PO DAILY [facility]


   Last Admin: 02/06/20 09:11 Dose:  81 mg


Atorvastatin Calcium (Lipitor -)  10 mg PO HS [facility]


   Last Admin: 02/05/20 22:30 Dose:  10 mg


Clotrimazole (Lotrisone Cream (Small Tube))  1 applic TP BID [facility]


   Last Admin: 02/06/20 09:59 Dose:  1 applic


Diltiazem HCl (Cardizem Cd -)  120 mg PO DAILY [facility]


   Last Admin: 02/06/20 09:11 Dose:  120 mg


Furosemide (Lasix -)  40 mg PO DAILY [facility]


   Last Admin: 02/06/20 09:11 Dose:  40 mg


Gabapentin (Neurontin -)  300 mg PO TID [facility]


   Last Admin: 02/06/20 13:13 Dose:  300 mg


Guaifenesin (Diabetic Tussin Dm -)  10 ml PO Q4H [facility]


   Last Admin: 02/06/20 17:38 Dose:  10 ml


Ceftriaxone Sodium 1 gm/ (Dextrose)  50 mls @ 100 mls/hr IVPB DAILY [facility]


   Last Admin: 02/06/20 09:13 Dose:  100 mls/hr


Azithromycin (Zithromax 500mg Ivpb (Pre-Docked))  500 mg in 250 mls @ 250 mls/

hr IVPB DAILY [facility]


   Last Admin: 02/06/20 09:58 Dose:  250 mls/hr


Insulin Aspart (Novolog Vial Sliding Scale -)  1 vial SQ ACHS [facility]; Protocol


   Last Admin: 02/06/20 16:41 Dose:  4 units


Levothyroxine Sodium (Synthroid -)  150 mcg PO AM [facility]


   Last Admin: 02/06/20 06:13 Dose:  150 mcg


Losartan Potassium (Cozaar -)  50 mg PO DAILY [facility]


   Last Admin: 02/06/20 09:11 Dose:  50 mg


Multi-Ingredient Ointment (Zinc Oxide)  1 applic TP BID [facility]


   Last Admin: 02/06/20 09:58 Dose:  1 applic


Potassium Chloride (Potassium Chloride Oral Liquid)  40 meq PO DAILY [facility]


   Last Admin: 02/06/20 09:12 Dose:  40 meq


Prednisone (Deltasone -)  40 mg PO DAILY [facility]


   Last Admin: 02/06/20 09:11 Dose:  40 mg











- Objective


Vital Signs: 


 Vital Signs











Temperature  98.3 F   02/06/20 14:00


 


Pulse Rate  90   02/06/20 14:00


 


Respiratory Rate  20   02/06/20 09:00


 


Blood Pressure  116/58 L  02/06/20 14:00


 


O2 Sat by Pulse Oximetry (%)  95   02/06/20 09:00











Constitutional: Yes: No Distress, Calm


Eyes: Yes: Conjunctiva Clear, EOM Intact


Cardiovascular: Yes: Regular Rate and Rhythm, S1, S2


Respiratory: Yes: On Nasal O2 (decreased rales)


Gastrointestinal: Yes: Normal Bowel Sounds, Soft, Abdomen, Obese


Genitourinary: Yes: Incontinence


Extremities: No: Calf Tenderness


Edema: LLE: Trace, RLE: Trace


Peripheral Pulses: Right Radial: 0


Integumentary: Yes: WNL


Neurological: Yes: Alert, Oriented


Psychiatric: Yes: Alert, Oriented


Labs: 


 CBC, BMP





 02/05/20 07:30 





 02/06/20 13:20 











Problem List





- Problems


(1) Arrhythmia


Assessment/Plan: 


sinus tachycardia


Diltiazem po








Code(s): I49.9 - CARDIAC ARRHYTHMIA, UNSPECIFIED   





(2) COPD with acute exacerbation


Assessment/Plan: 


taper pREDNISONE,


continue Duoenb every 6 hours 


Zithromax and Ceftriaxone were completed


Code(s): J44.1 - CHRONIC OBSTRUCTIVE PULMONARY DISEASE W (ACUTE) EXACERBATION   





(3) Chronic kidney failure


Assessment/Plan: 


kidney function is at baseline


 restarted lASIX PO


Code(s): N18.9 - CHRONIC KIDNEY DISEASE, UNSPECIFIED   





(4) Diabetes mellitus type 2 in obese


Assessment/Plan: 


accuchecks  AC and HS with regular coverage


restart Glipizide ER 2.5 mg po daily


Code(s): E11.69 - TYPE 2 DIABETES MELLITUS WITH OTHER SPECIFIED COMPLICATION; 

E66.9 - OBESITY, UNSPECIFIED   





(5) Pulmonary edema


Assessment/Plan: 


REPEAT CXR in am








Code(s): J81.1 - CHRONIC PULMONARY EDEMA   





(6) Sepsis


Assessment/Plan: 


Sepsis present upon admission resolved


fever responded to antibiotics 





Code(s): A41.9 - SEPSIS, UNSPECIFIED ORGANISM

## 2020-02-07 VITALS — SYSTOLIC BLOOD PRESSURE: 134 MMHG | TEMPERATURE: 98.5 F | HEART RATE: 89 BPM | DIASTOLIC BLOOD PRESSURE: 63 MMHG

## 2020-02-07 RX ADMIN — GUAIFENESIN AND DEXTROMETHORPHAN HYDROBROMIDE SCH: 100; 10 SOLUTION ORAL at 05:35

## 2020-02-07 RX ADMIN — ZINC OXIDE SCH APPLIC: 200 OINTMENT TOPICAL at 10:13

## 2020-02-07 RX ADMIN — LEVOTHYROXINE SODIUM SCH MCG: 150 TABLET ORAL at 06:04

## 2020-02-07 RX ADMIN — FUROSEMIDE SCH MG: 40 TABLET ORAL at 10:11

## 2020-02-07 RX ADMIN — INSULIN ASPART SCH UNITS: 100 INJECTION, SOLUTION INTRAVENOUS; SUBCUTANEOUS at 12:29

## 2020-02-07 RX ADMIN — INSULIN ASPART SCH UNITS: 100 INJECTION, SOLUTION INTRAVENOUS; SUBCUTANEOUS at 06:04

## 2020-02-07 RX ADMIN — POTASSIUM CHLORIDE SCH MEQ: 20 SOLUTION ORAL at 10:10

## 2020-02-07 RX ADMIN — AZITHROMYCIN DIHYDRATE SCH MLS/HR: 500 INJECTION, POWDER, LYOPHILIZED, FOR SOLUTION INTRAVENOUS at 10:08

## 2020-02-07 RX ADMIN — GUAIFENESIN AND DEXTROMETHORPHAN HYDROBROMIDE SCH: 100; 10 SOLUTION ORAL at 02:19

## 2020-02-07 RX ADMIN — GABAPENTIN SCH MG: 300 CAPSULE ORAL at 14:26

## 2020-02-07 RX ADMIN — GUAIFENESIN AND DEXTROMETHORPHAN HYDROBROMIDE SCH: 100; 10 SOLUTION ORAL at 10:11

## 2020-02-07 RX ADMIN — GUAIFENESIN AND DEXTROMETHORPHAN HYDROBROMIDE SCH ML: 100; 10 SOLUTION ORAL at 17:11

## 2020-02-07 RX ADMIN — GUAIFENESIN AND DEXTROMETHORPHAN HYDROBROMIDE SCH ML: 100; 10 SOLUTION ORAL at 06:04

## 2020-02-07 RX ADMIN — LOSARTAN POTASSIUM SCH MG: 50 TABLET, FILM COATED ORAL at 10:11

## 2020-02-07 RX ADMIN — ANASTROZOLE SCH MG: 1 TABLET, COATED ORAL at 10:12

## 2020-02-07 RX ADMIN — GABAPENTIN SCH MG: 300 CAPSULE ORAL at 05:35

## 2020-02-07 RX ADMIN — CLOTRIMAZOLE AND BETAMETHASONE DIPROPIONATE SCH APPLIC: 10; .5 CREAM TOPICAL at 10:13

## 2020-02-07 RX ADMIN — INSULIN ASPART SCH UNITS: 100 INJECTION, SOLUTION INTRAVENOUS; SUBCUTANEOUS at 17:25

## 2020-02-07 RX ADMIN — CEFTRIAXONE SCH MLS/HR: 1 INJECTION, POWDER, FOR SOLUTION INTRAMUSCULAR; INTRAVENOUS at 10:07

## 2020-02-07 RX ADMIN — ASPIRIN 81 MG SCH MG: 81 TABLET ORAL at 10:11

## 2020-02-07 RX ADMIN — IPRATROPIUM BROMIDE AND ALBUTEROL SULFATE PRN AMP: .5; 3 SOLUTION RESPIRATORY (INHALATION) at 08:32

## 2020-02-07 RX ADMIN — GUAIFENESIN AND DEXTROMETHORPHAN HYDROBROMIDE SCH ML: 100; 10 SOLUTION ORAL at 13:10

## 2020-02-07 NOTE — DS
Physical Examination


Vital Signs: 


 Vital Signs











Temperature  99 F   02/07/20 05:30


 


Pulse Rate  85   02/07/20 05:30


 


Respiratory Rate  20   02/07/20 05:30


 


Blood Pressure  131/90   02/07/20 05:30


 


O2 Sat by Pulse Oximetry (%)  93 L  02/06/20 21:00











Constitutional: Yes: No Distress, Calm


Eyes: Yes: Conjunctiva Clear, EOM Intact


HENT: Yes: Atraumatic, Normocephalic


Neck: Yes: Supple, Trachea Midline


Cardiovascular: Yes: Regular Rate and Rhythm, S1, S2


Respiratory: Yes: Regular, On Nasal O2 (patient was not on oxygen prior to her 

admisison, and would likely be tapered off oxygen supplementation as her 

respiratory status recovers), Orthopnea


Gastrointestinal: Yes: Normal Bowel Sounds, Soft, Abdomen, Obese.  No: 

Hepatomegaly, Splenomegaly


Extremities: Yes: WNL.  No: Calf Tenderness


Edema: No


Neurological: Yes: Alert, Oriented


...Motor Strength: LUE (decreased strength in lower extremities due to Multiple 

Sclerosis), LLE


Psychiatric: Yes: Alert, Oriented


Labs: 


 CBC, BMP





 02/05/20 07:30 





 02/06/20 13:20 











Discharge Summary


Problems reviewed: Yes


Reason For Visit: PNEUMONIA, ACUTE RENAL FAILURE


Current Active Problems





Acute renal failure (Acute)


Acute renal failure (Acute)


Arrhythmia (Acute)


COPD with acute exacerbation (Acute)


Chronic kidney failure (Acute)


Diabetes mellitus type 2 in nonobese (Acute)


Diabetes mellitus type 2 in obese (Acute)


Lactic acidosis (Acute)


Pneumonia (Acute)


Pulmonary edema (Acute)


Sepsis (Acute)


Upper respiratory infection (Acute)








Hospital Course: 


89 y female with PMH of Multiple Sclerosis with LE weakness and urinary 

incontinence, , COPD, DM type 2 with nephropathy and CKD, was admitted for COPD 

exacerbation and Sepsis.


PAteint required IV antibiotics and SoluCortef.


Due to repeated episodes of SVT with HR in the range of 150-160 she was started 

of Cardizem po with good control.


A nuclear stress test performed during this admission showed old apical defect. 

She is considered to be cardiac wise stable.A CXR is pending at the time this 

note is documented





I restarted her on Glipizide Xr and she will continue to benefit fron ALICIA as 

her prednisone dosage is tapered off. Started today on 20 mg po daily.


I recommend the use of PureWick device for urinary incontinence management.


Due to her decreased mobility and new requirement for continuous oxygen 

supplementation both at rest and during effort she is an appropriate candidate 

for short term rehabilitation


Condition: Guarded





- Instructions


Referrals: 


Melania Lemons MD [Primary Care Provider] - 





- Home Medications


Comprehensive Discharge Medication List: 


Ambulatory Orders





Simvastatin 10 mg PO HS 01/10/17 


Budesonide/Formeterol Fumarate [SYMBICORT 160/4.5mcg -] 2 puff IH BID  inhaler 

12/06/18 


Aspirin [ASA -] 81 mg PO DAILY  tab.chew 12/07/18 


Calcium Carbonate/Vitamin D3 [Calcium 500-Vit D3 600 Caplet] 2 each PO DAILY 04/ 04/19 


Cholecalciferol (Vitamin D3) [Vitamin D3] 2,000 unit PO DAILY 04/04/19 


Potassium Chloride [Klor-Con M20] 20 meq PO DAILY 04/04/19 


Acetaminophen [Tylenol .Regular Strength -] 650 mg PO Q6H PRN  tablet 08/09/19 


Anastrozole [Arimidex -] 1 mg PO DAILY  tablet 08/09/19 


Losartan 50Mg/Hctz 12.5MG [Hyzaar -] 2 tab PO DAILY  tablet 08/09/19 


Nystatin Powder [Nystop Powder -] 1 applic TP TID 10 Days #1 applic 08/09/19 


Zinc Oxide 1 applic TP BID #1 tube 08/09/19 


Cilostazol [Pletal -] 100 mg PO BID 01/26/20 


Furosemide [Lasix] 20 mg PO DAILY 01/26/20 


Gabapentin [Neurontin] 300 mg PO TID 01/26/20 


Levothyroxine [Synthroid -] 150 mcg PO DAILY 01/26/20

## 2020-02-07 NOTE — PN
Progress Note, Physician


Chief Complaint: 





feeling better


No CP


No palps


TELE: NSR





- Current Medication List


Current Medications: 


Active Medications





Acetaminophen (Tylenol -)  650 mg PO Q6H PRN


   PRN Reason: MODERATE PAIN (4-7)


Albuterol/Ipratropium (Duoneb -)  1 amp NEB Q6H PRN


   PRN Reason: SHORTNESS OF BREATH


   Last Admin: 02/07/20 08:32 Dose:  1 amp


Anastrozole (Arimidex -)  1 mg PO DAILY [facility]


   Last Admin: 02/07/20 10:12 Dose:  1 mg


Aspirin (Asa -)  81 mg PO DAILY [facility]


   Last Admin: 02/07/20 10:11 Dose:  81 mg


Atorvastatin Calcium (Lipitor -)  10 mg PO HS [facility]


   Last Admin: 02/06/20 22:38 Dose:  10 mg


Clotrimazole (Lotrisone Cream (Small Tube))  1 applic TP BID [facility]


   Last Admin: 02/07/20 10:13 Dose:  1 applic


Diltiazem HCl (Cardizem Cd -)  120 mg PO DAILY [facility]


   Last Admin: 02/07/20 10:11 Dose:  120 mg


Furosemide (Lasix -)  40 mg PO DAILY [facility]


   Last Admin: 02/07/20 10:11 Dose:  40 mg


Gabapentin (Neurontin -)  300 mg PO TID [facility]


   Last Admin: 02/07/20 05:35 Dose:  300 mg


Glipizide (Glucotrol Xl -)  2.5 mg PO DAILY@0700 [facility]


Guaifenesin (Diabetic Tussin Dm -)  10 ml PO Q4H [facility]


   Last Admin: 02/07/20 06:04 Dose:  10 ml


Ceftriaxone Sodium 1 gm/ (Dextrose)  50 mls @ 100 mls/hr IVPB DAILY [facility]


   Last Admin: 02/07/20 10:07 Dose:  100 mls/hr


Azithromycin (Zithromax 500mg Ivpb (Pre-Docked))  500 mg in 250 mls @ 250 mls/

hr IVPB DAILY [facility]


   Last Admin: 02/07/20 10:08 Dose:  250 mls/hr


Insulin Aspart (Novolog Vial Sliding Scale -)  1 vial SQ ACHS [facility]; Protocol


   Last Admin: 02/07/20 06:04 Dose:  2 units


Levothyroxine Sodium (Synthroid -)  150 mcg PO AM [facility]


   Last Admin: 02/07/20 06:04 Dose:  150 mcg


Losartan Potassium (Cozaar -)  50 mg PO DAILY [facility]


   Last Admin: 02/07/20 10:11 Dose:  50 mg


Multi-Ingredient Ointment (Zinc Oxide)  1 applic TP BID [facility]


   Last Admin: 02/07/20 10:13 Dose:  1 applic


Potassium Chloride (Potassium Chloride Oral Liquid)  40 meq PO DAILY [facility]


   Last Admin: 02/07/20 10:10 Dose:  40 meq


Prednisone (Deltasone -)  20 mg PO DAILY [facility]


   Last Admin: 02/07/20 10:11 Dose:  20 mg











- Objective


Vital Signs: 


 Vital Signs











Temperature  99 F   02/07/20 05:30


 


Pulse Rate  85   02/07/20 05:30


 


Respiratory Rate  20   02/07/20 05:30


 


Blood Pressure  131/90   02/07/20 05:30


 


O2 Sat by Pulse Oximetry (%)  93 L  02/06/20 21:00











Constitutional: Yes: Calm


Cardiovascular: Yes: Regular Rate and Rhythm


Respiratory: Yes: Rhonchi


Gastrointestinal: Yes: Soft, Abdomen, Obese


Edema: Yes


Edema: LLE: 1+, RLE: 1+


Neurological: Yes: Alert, Oriented


Labs: 


 CBC, BMP





 02/05/20 07:30 





 02/06/20 13:20 











- ....Imaging


Chest X-ray: Report Reviewed





Assessment/Plan








Assessment/Plan





ECG x2: probable sinus with frequent APCs. RBBB, LAFB. + long QT. no path q's. 

nonsp ST-Ts--vs priors: the APCs are new, QT previously prolonged





Echo 1/20: borderline EF 50%, mild LW hypo. RV normal. mild-mod MR. RVSP 30-40





tele: sinus











acute diast CHF:


- diuresed with IV lasix, dyspnea and edema improved


- transitioned to lasix 40 mg PO daily


-echo preserved EF, valve fxn unremarkable, mild pulm HTN


- pharm MPI showed small apical scar, no ischemia. Would treat medically. Cont 

ASA/Statin. 





PSVT:


-brief runs on tele


-started on diltiazem for prophylaxis (avoiding b-b given active bronchospasm 

in copd pt)


- cont cardizem


- dc tele





prolonged QT:


-not a new finding (present on prior ECG 8/19, > 500 msec then as well).


-? effect of cilostazol (known offender with torsades risk associated) or 

Symbicort (formoterol component can prolong QT)--both meds presently being held


-K, Mag optimized


-cont lyte optimization, avoidance of QT prolonging meds





KITTY:


-? sec sepsis, vs hypovolemia--improved to baseline


-improved


-baseline 1.4-1.6





htn:


-mostly controlled


-same meds, observe trend.





copd:


-plan per pulm





periph vascular dz, venous ins'y:


-followed by wound center at [place]


-rx'd cilostazol for severe resting LE pains--sx much improved


-holding this med here sec to QT prolongation--monitor for claudication sx's





mult sclerosis:


-per primary

## 2020-02-07 NOTE — PN
Progress Note (short form)





- Note


Progress Note: 


Resting in NAD on NC O2.  


Overall improved.   


No acute events overnight. 





 Intake & Output











 02/04/20 02/05/20 02/06/20 02/07/20





 23:59 23:59 23:59 23:59


 


Intake Total  400


 


Output Total 1800 2000 2100 200


 


Balance -1470 -1290 -140 200


 


Weight  225 lb 12.8 oz 225 lb 4 oz 











 Last Vital Signs











Temp Pulse Resp BP Pulse Ox


 


 98.8 F   79   20   126/88   93 L


 


 02/07/20 10:00  02/07/20 10:00  02/07/20 10:00  02/07/20 10:00  02/07/20 09:00








Active Medications





Acetaminophen (Tylenol -)  650 mg PO Q6H PRN


   PRN Reason: MODERATE PAIN (4-7)


Albuterol/Ipratropium (Duoneb -)  1 amp NEB Q6H PRN


   PRN Reason: SHORTNESS OF BREATH


   Last Admin: 02/07/20 08:32 Dose:  1 amp


Anastrozole (Arimidex -)  1 mg PO DAILY [facility]


   Last Admin: 02/07/20 10:12 Dose:  1 mg


Aspirin (Asa -)  81 mg PO DAILY [facility]


   Last Admin: 02/07/20 10:11 Dose:  81 mg


Atorvastatin Calcium (Lipitor -)  10 mg PO HS [facility]


   Last Admin: 02/06/20 22:38 Dose:  10 mg


Clotrimazole (Lotrisone Cream (Small Tube))  1 applic TP BID [facility]


   Last Admin: 02/07/20 10:13 Dose:  1 applic


Diltiazem HCl (Cardizem Cd -)  120 mg PO DAILY [facility]


   Last Admin: 02/07/20 10:11 Dose:  120 mg


Furosemide (Lasix -)  40 mg PO DAILY [facility]


   Last Admin: 02/07/20 10:11 Dose:  40 mg


Gabapentin (Neurontin -)  300 mg PO TID [facility]


   Last Admin: 02/07/20 05:35 Dose:  300 mg


Glipizide (Glucotrol Xl -)  2.5 mg PO DAILY@0700 [facility]


Guaifenesin (Diabetic Tussin Dm -)  10 ml PO Q4H [facility]


   Last Admin: 02/07/20 06:04 Dose:  10 ml


Ceftriaxone Sodium 1 gm/ (Dextrose)  50 mls @ 100 mls/hr IVPB DAILY [facility]


   Last Admin: 02/07/20 10:07 Dose:  100 mls/hr


Azithromycin (Zithromax 500mg Ivpb (Pre-Docked))  500 mg in 250 mls @ 250 mls/

hr IVPB DAILY [facility]


   Last Admin: 02/07/20 10:08 Dose:  250 mls/hr


Insulin Aspart (Novolog Vial Sliding Scale -)  1 vial SQ ACHS [facility]; Protocol


   Last Admin: 02/07/20 12:29 Dose:  2 units


Levothyroxine Sodium (Synthroid -)  150 mcg PO AM [facility]


   Last Admin: 02/07/20 06:04 Dose:  150 mcg


Losartan Potassium (Cozaar -)  50 mg PO DAILY [facility]


   Last Admin: 02/07/20 10:11 Dose:  50 mg


Multi-Ingredient Ointment (Zinc Oxide)  1 applic TP BID [facility]


   Last Admin: 02/07/20 10:13 Dose:  1 applic


Potassium Chloride (Potassium Chloride Oral Liquid)  40 meq PO DAILY [facility]


   Last Admin: 02/07/20 10:10 Dose:  40 meq


Prednisone (Deltasone -)  20 mg PO DAILY [facility]


   Last Admin: 02/07/20 10:11 Dose:  20 mg








Gen:  NAD 


Heart: RRR


Lung: Improving bibasilar rhonchi/rales, no wheezes


Abd: soft, nontender


Ext: + edema


 Laboratory Results - last 24 hr











  02/06/20 02/06/20 02/06/20





  12:30 13:20 13:20


 


Sodium   136 


 


Potassium   4.7 


 


Chloride   95 L 


 


Carbon Dioxide   32 


 


Anion Gap   9 


 


BUN   71.4 H 


 


Creatinine   1.8 H 


 


Est GFR (CKD-EPI)AfAm   28.42 


 


Est GFR (CKD-EPI)NonAf   24.52 


 


POC Glucometer  329  


 


Random Glucose   418 H* 


 


Lactic Acid    4.1 H*


 


Calcium   8.4 L 














  02/06/20 02/06/20 02/07/20





  16:36 22:36 05:39


 


Sodium   


 


Potassium   


 


Chloride   


 


Carbon Dioxide   


 


Anion Gap   


 


BUN   


 


Creatinine   


 


Est GFR (CKD-EPI)AfAm   


 


Est GFR (CKD-EPI)NonAf   


 


POC Glucometer  227  237  177


 


Random Glucose   


 


Lactic Acid   


 


Calcium   














  02/07/20





  12:22


 


Sodium 


 


Potassium 


 


Chloride 


 


Carbon Dioxide 


 


Anion Gap 


 


BUN 


 


Creatinine 


 


Est GFR (CKD-EPI)AfAm 


 


Est GFR (CKD-EPI)NonAf 


 


POC Glucometer  196


 


Random Glucose 


 


Lactic Acid 


 


Calcium 














Problem List





- Problems


(1) COPD with acute exacerbation


Code(s): J44.1 - CHRONIC OBSTRUCTIVE PULMONARY DISEASE W (ACUTE) EXACERBATION   








A/P


URI


Acute COPD Exacerbation


Sepsis


Lactic Acidosis


Acute on Chronic Renal Failure


HTN


Hyperlipidemia


Anemia





-  Lasix 


-  ABX per ID


-  Prednisone  


-  inhaled bronchodilators


-  O2 to keep Spo2 >90%


-  DVT prophylaxis


-  DC planning 





Dr Fritz

## 2024-06-13 NOTE — PN
Health Maintenance       Pneumococcal Vaccine 0-64 (1 of 2 - PCV)  Never done    Hepatitis B Vaccine (1 of 3 - 19+ 3-dose series)  Never done    Shingles Vaccine (1 of 2)  Never done    Breast Cancer Screening (Yearly)  Ordered on 11/30/2023    COVID-19 Vaccine (6 - 2023-24 season)  Overdue since 9/1/2023           Following review of the above:  Patient is not proceeding with: Mammogram, COVID-19, Hep B, Pneumococcal, and Shingles    Note: Refer to final orders and clinician documentation.       Progress Note, Physician


Chief Complaint: 





dyspnea and wheezing, productive cough


History of Present Illness: 





89 admitted for high fever, shortness of breath and cough is seen by me day5 of 

admission. PAtient was started on iv antibiotics, and SoluCortef and is 

subjectively feeling better.


Cough is sporadic. Wheezing persists after cough. She is able to ambulate with 

assistance and a walker for short distances..








- Current Medication List


Current Medications: 


Active Medications





Acetaminophen (Tylenol -)  650 mg PO Q6H PRN


   PRN Reason: MODERATE PAIN (4-7)


Albuterol Sulfate (Ventolin 0.083% Nebulizer Soln -)  1 amp NEB Q4H PRN


   PRN Reason: SHORT OF BREATH/WHEEZING


Albuterol/Ipratropium (Duoneb -)  1 amp NEB RQID [facility]


   Last Admin: 01/29/20 12:00 Dose:  1 amp


Anastrozole (Arimidex -)  1 mg PO DAILY [facility]


   Last Admin: 01/29/20 10:26 Dose:  1 mg


Aspirin (Asa -)  81 mg PO DAILY [facility]


   Last Admin: 01/29/20 09:30 Dose:  81 mg


Atorvastatin Calcium (Lipitor -)  10 mg PO HS [facility]


   Last Admin: 01/28/20 21:11 Dose:  10 mg


Clotrimazole (Lotrisone Cream (Small Tube))  1 applic TP BID [facility]


Furosemide (Lasix -)  20 mg PO DAILY [facility]


   Last Admin: 01/29/20 09:30 Dose:  20 mg


Gabapentin (Neurontin -)  300 mg PO TID [facility]


   Last Admin: 01/29/20 06:14 Dose:  300 mg


Guaifenesin (Diabetic Tussin Dm -)  10 ml PO Q4H [facility]


   Last Admin: 01/29/20 09:32 Dose:  10 ml


Heparin Sodium (Porcine) (Heparin -)  5,000 unit SQ BID [facility]


   Last Admin: 01/29/20 09:31 Dose:  5,000 unit


Hydrochlorothiazide (Hctz -)  12.5 mg PO DAILY [facility]


   Last Admin: 01/29/20 09:31 Dose:  12.5 mg


Ceftriaxone Sodium 1 gm/ (Dextrose)  50 mls @ 100 mls/hr IVPB DAILY [facility]


   Last Admin: 01/29/20 09:30 Dose:  100 mls/hr


Azithromycin (Zithromax 500mg Ivpb (Pre-Docked))  500 mg in 250 mls @ 250 mls/

hr IVPB DAILY [facility]


   Last Admin: 01/29/20 09:29 Dose:  250 mls/hr


Levothyroxine Sodium (Synthroid -)  150 mcg PO AM [facility]


   Last Admin: 01/29/20 06:14 Dose:  150 mcg


Losartan Potassium (Cozaar -)  50 mg PO DAILY [facility]


   Last Admin: 01/29/20 09:31 Dose:  50 mg


Methylprednisolone Sodium Succinate (Solu-Medrol -)  40 mg IVPUSH Q8H-IV [facility]


   Last Admin: 01/29/20 09:30 Dose:  40 mg


Multi-Ingredient Ointment (Zinc Oxide)  1 applic TP BID [facility]


   Last Admin: 01/29/20 09:31 Dose:  1 applic











- Objective


Vital Signs: 


 Vital Signs











Temperature  97.4 F L  01/29/20 10:00


 


Pulse Rate  90   01/29/20 10:00


 


Respiratory Rate  18   01/29/20 10:00


 


Blood Pressure  155/82   01/29/20 10:00


 


O2 Sat by Pulse Oximetry (%)  95   01/28/20 21:00











Constitutional: Yes: No Distress, Calm


Eyes: Yes: Conjunctiva Clear, EOM Intact


HENT: Yes: Atraumatic, Normocephalic


Neck: Yes: Supple, Trachea Midline


Cardiovascular: Yes: Regular Rate and Rhythm, S1, S2


Respiratory: Yes: Regular, SOB on Exertion, Wheezes (at the right base)


Gastrointestinal: Yes: Normal Bowel Sounds, Soft, Abdomen, Obese


...Rectal Exam: Yes: Deferred


Musculoskeletal: Yes: Muscle Weakness


Extremities: No: Calf Tenderness


Edema: Yes


Edema: LLE: 1+, RLE: 1+


Peripheral Pulses WNL: Yes


Neurological: Yes: Alert, Oriented


Psychiatric: Yes: Alert, Oriented


Labs: 


 CBC, BMP





 01/29/20 05:35 





 01/29/20 05:35 











Problem List





- Problems


(1) COPD with acute exacerbation


Assessment/Plan: 


continue SoluCortef and Ceftriaxone


Code(s): J44.1 - CHRONIC OBSTRUCTIVE PULMONARY DISEASE W (ACUTE) EXACERBATION   





(2) Chronic kidney failure


Assessment/Plan: 


better, restarted the HCTZ


Code(s): N18.9 - CHRONIC KIDNEY DISEASE, UNSPECIFIED   





(3) Diabetes mellitus type 2 in obese


Assessment/Plan: 


accuchecks  AC and HS with regular coverage


Code(s): E11.69 - TYPE 2 DIABETES MELLITUS WITH OTHER SPECIFIED COMPLICATION; 

E66.9 - OBESITY, UNSPECIFIED